# Patient Record
Sex: FEMALE | Race: WHITE | HISPANIC OR LATINO | Employment: UNEMPLOYED | URBAN - METROPOLITAN AREA
[De-identification: names, ages, dates, MRNs, and addresses within clinical notes are randomized per-mention and may not be internally consistent; named-entity substitution may affect disease eponyms.]

---

## 2019-10-10 ENCOUNTER — HOSPITAL ENCOUNTER (EMERGENCY)
Facility: HOSPITAL | Age: 69
Discharge: HOME | End: 2019-10-10
Attending: EMERGENCY MEDICINE | Admitting: EMERGENCY MEDICINE
Payer: COMMERCIAL

## 2019-10-10 VITALS
OXYGEN SATURATION: 97 % | WEIGHT: 263 LBS | RESPIRATION RATE: 16 BRPM | BODY MASS INDEX: 46.6 KG/M2 | TEMPERATURE: 97.6 F | HEART RATE: 64 BPM | HEIGHT: 63 IN | SYSTOLIC BLOOD PRESSURE: 199 MMHG | DIASTOLIC BLOOD PRESSURE: 86 MMHG

## 2019-10-10 DIAGNOSIS — R10.13 EPIGASTRIC PAIN: Primary | ICD-10-CM

## 2019-10-10 DIAGNOSIS — I10 HYPERTENSION: ICD-10-CM

## 2019-10-10 LAB
ALBUMIN SERPL BCP-MCNC: 3.5 G/DL (ref 3.5–5)
ALP SERPL-CCNC: 79 U/L (ref 46–116)
ALT SERPL W P-5'-P-CCNC: 16 U/L (ref 12–78)
ANION GAP SERPL CALCULATED.3IONS-SCNC: 12 MMOL/L (ref 4–13)
AST SERPL W P-5'-P-CCNC: 14 U/L (ref 5–45)
BASOPHILS # BLD AUTO: 0.05 THOUSANDS/ΜL (ref 0–0.1)
BASOPHILS NFR BLD AUTO: 1 % (ref 0–1)
BILIRUB DIRECT SERPL-MCNC: 0.07 MG/DL (ref 0–0.2)
BILIRUB SERPL-MCNC: 0.2 MG/DL (ref 0.2–1)
BILIRUB UR QL STRIP: NEGATIVE
BUN SERPL-MCNC: 21 MG/DL (ref 5–25)
CALCIUM SERPL-MCNC: 9.4 MG/DL (ref 8.3–10.1)
CHLORIDE SERPL-SCNC: 104 MMOL/L (ref 100–108)
CLARITY UR: CLEAR
CO2 SERPL-SCNC: 26 MMOL/L (ref 21–32)
COLOR UR: YELLOW
CREAT SERPL-MCNC: 1.02 MG/DL (ref 0.6–1.3)
EOSINOPHIL # BLD AUTO: 0.29 THOUSAND/ΜL (ref 0–0.61)
EOSINOPHIL NFR BLD AUTO: 4 % (ref 0–6)
ERYTHROCYTE [DISTWIDTH] IN BLOOD BY AUTOMATED COUNT: 14.5 % (ref 11.6–15.1)
GFR SERPL CREATININE-BSD FRML MDRD: 57 ML/MIN/1.73SQ M
GLUCOSE SERPL-MCNC: 91 MG/DL (ref 65–140)
GLUCOSE UR STRIP-MCNC: NEGATIVE MG/DL
HCT VFR BLD AUTO: 44 % (ref 34.8–46.1)
HGB BLD-MCNC: 14.2 G/DL (ref 11.5–15.4)
HGB UR QL STRIP.AUTO: NEGATIVE
IMM GRANULOCYTES # BLD AUTO: 0.02 THOUSAND/UL (ref 0–0.2)
IMM GRANULOCYTES NFR BLD AUTO: 0 % (ref 0–2)
KETONES UR STRIP-MCNC: NEGATIVE MG/DL
LEUKOCYTE ESTERASE UR QL STRIP: NEGATIVE
LIPASE SERPL-CCNC: 174 U/L (ref 73–393)
LYMPHOCYTES # BLD AUTO: 1.73 THOUSANDS/ΜL (ref 0.6–4.47)
LYMPHOCYTES NFR BLD AUTO: 21 % (ref 14–44)
MCH RBC QN AUTO: 29.2 PG (ref 26.8–34.3)
MCHC RBC AUTO-ENTMCNC: 32.3 G/DL (ref 31.4–37.4)
MCV RBC AUTO: 91 FL (ref 82–98)
MONOCYTES # BLD AUTO: 0.67 THOUSAND/ΜL (ref 0.17–1.22)
MONOCYTES NFR BLD AUTO: 8 % (ref 4–12)
NEUTROPHILS # BLD AUTO: 5.46 THOUSANDS/ΜL (ref 1.85–7.62)
NEUTS SEG NFR BLD AUTO: 66 % (ref 43–75)
NITRITE UR QL STRIP: NEGATIVE
NRBC BLD AUTO-RTO: 0 /100 WBCS
PH UR STRIP.AUTO: 6 [PH]
PLATELET # BLD AUTO: 305 THOUSANDS/UL (ref 149–390)
PMV BLD AUTO: 9.3 FL (ref 8.9–12.7)
POTASSIUM SERPL-SCNC: 3.8 MMOL/L (ref 3.5–5.3)
PROT SERPL-MCNC: 8.3 G/DL (ref 6.4–8.2)
PROT UR STRIP-MCNC: NEGATIVE MG/DL
RBC # BLD AUTO: 4.86 MILLION/UL (ref 3.81–5.12)
SODIUM SERPL-SCNC: 142 MMOL/L (ref 136–145)
SP GR UR STRIP.AUTO: 1.01 (ref 1–1.03)
TROPONIN I SERPL-MCNC: <0.02 NG/ML
UROBILINOGEN UR QL STRIP.AUTO: 0.2 E.U./DL
WBC # BLD AUTO: 8.22 THOUSAND/UL (ref 4.31–10.16)

## 2019-10-10 PROCEDURE — 93005 ELECTROCARDIOGRAM TRACING: CPT

## 2019-10-10 PROCEDURE — 99283 EMERGENCY DEPT VISIT LOW MDM: CPT

## 2019-10-10 PROCEDURE — 80048 BASIC METABOLIC PNL TOTAL CA: CPT | Performed by: EMERGENCY MEDICINE

## 2019-10-10 PROCEDURE — 81003 URINALYSIS AUTO W/O SCOPE: CPT | Performed by: EMERGENCY MEDICINE

## 2019-10-10 PROCEDURE — 99285 EMERGENCY DEPT VISIT HI MDM: CPT | Performed by: EMERGENCY MEDICINE

## 2019-10-10 PROCEDURE — 36415 COLL VENOUS BLD VENIPUNCTURE: CPT | Performed by: EMERGENCY MEDICINE

## 2019-10-10 PROCEDURE — 96360 HYDRATION IV INFUSION INIT: CPT

## 2019-10-10 PROCEDURE — 80076 HEPATIC FUNCTION PANEL: CPT | Performed by: EMERGENCY MEDICINE

## 2019-10-10 PROCEDURE — 83690 ASSAY OF LIPASE: CPT | Performed by: EMERGENCY MEDICINE

## 2019-10-10 PROCEDURE — 84484 ASSAY OF TROPONIN QUANT: CPT | Performed by: EMERGENCY MEDICINE

## 2019-10-10 PROCEDURE — 85025 COMPLETE CBC W/AUTO DIFF WBC: CPT | Performed by: EMERGENCY MEDICINE

## 2019-10-10 PROCEDURE — 96361 HYDRATE IV INFUSION ADD-ON: CPT

## 2019-10-10 RX ORDER — SUCRALFATE 1 G/1
1 TABLET ORAL 4 TIMES DAILY
Qty: 120 TABLET | Refills: 0 | Status: SHIPPED | OUTPATIENT
Start: 2019-10-10 | End: 2019-11-05

## 2019-10-10 RX ORDER — OMEPRAZOLE 20 MG/1
20 CAPSULE, DELAYED RELEASE ORAL DAILY
Qty: 30 CAPSULE | Refills: 3 | Status: SHIPPED | OUTPATIENT
Start: 2019-10-10 | End: 2019-11-05

## 2019-10-10 RX ORDER — LIDOCAINE HYDROCHLORIDE 20 MG/ML
15 SOLUTION OROPHARYNGEAL ONCE
Status: COMPLETED | OUTPATIENT
Start: 2019-10-10 | End: 2019-10-10

## 2019-10-10 RX ORDER — MAGNESIUM HYDROXIDE/ALUMINUM HYDROXICE/SIMETHICONE 120; 1200; 1200 MG/30ML; MG/30ML; MG/30ML
30 SUSPENSION ORAL ONCE
Status: COMPLETED | OUTPATIENT
Start: 2019-10-10 | End: 2019-10-10

## 2019-10-10 RX ADMIN — SODIUM CHLORIDE 1000 ML: 0.9 INJECTION, SOLUTION INTRAVENOUS at 21:44

## 2019-10-10 RX ADMIN — LIDOCAINE HYDROCHLORIDE 15 ML: 20 SOLUTION ORAL; TOPICAL at 22:03

## 2019-10-10 RX ADMIN — ALUMINUM HYDROXIDE, MAGNESIUM HYDROXIDE, AND SIMETHICONE 30 ML: 200; 200; 20 SUSPENSION ORAL at 22:03

## 2019-10-11 LAB
ATRIAL RATE: 73 BPM
P AXIS: 37 DEGREES
PR INTERVAL: 154 MS
QRS AXIS: 46 DEGREES
QRSD INTERVAL: 80 MS
QT INTERVAL: 402 MS
QTC INTERVAL: 442 MS
T WAVE AXIS: 64 DEGREES
VENTRICULAR RATE: 73 BPM

## 2019-10-11 PROCEDURE — 93010 ELECTROCARDIOGRAM REPORT: CPT | Performed by: INTERNAL MEDICINE

## 2019-10-11 NOTE — ED PROVIDER NOTES
History  Chief Complaint   Patient presents with    Medical Problem     pt c/o anxiety, pt states she got a flu shot and pneumonia shot on friday and has not been feeling right since  pt with multiple complaints: 4lb weight loss, indigestion, nausea, poor appetite, "bad feelings in my stomach," "jaw quivering dry mouth, headaches  Pt comes to ED w one week of several concerns  She reports epigastric pain similar to prior gastritis sxs, on otc omeprazole, last EGD several years ago, pain ongoing x 1 week, mild to moderate, worse with PO intake, some relief from PPI  No black or bloody stools  No fever  No back pain  No weakness or syncope  Also c/o indigestion, poor appetite, dry mouth, drooling, b/l ear pain, and HA which all began after she got a flu shot and prevnar 23 last week  No lower abd pain  No AMS or vomiting  No focal weakness or numbness  No other sxs or concerns at this time  Additional history per  at bedside  None       Past Medical History:   Diagnosis Date    GERD (gastroesophageal reflux disease)        Past Surgical History:   Procedure Laterality Date     SECTION      OVARIAN CYST REMOVAL         History reviewed  No pertinent family history  I have reviewed and agree with the history as documented  Social History     Tobacco Use    Smoking status: Never Smoker    Smokeless tobacco: Never Used   Substance Use Topics    Alcohol use: Never     Frequency: Never    Drug use: Never        Review of Systems   Constitutional: Positive for appetite change and unexpected weight change  Negative for activity change and fever  Gastrointestinal: Positive for abdominal pain and nausea  Negative for abdominal distention, anal bleeding, blood in stool, constipation, diarrhea, rectal pain and vomiting  All other systems reviewed and are negative  Physical Exam  Physical Exam   Constitutional: She is oriented to person, place, and time   She appears well-developed and well-nourished  No distress  HENT:   Head: Normocephalic and atraumatic  Right Ear: External ear normal    Left Ear: External ear normal    Mouth/Throat: Oropharynx is clear and moist  No oropharyngeal exudate  B/l TMs normal     Eyes: Pupils are equal, round, and reactive to light  Conjunctivae and EOM are normal  Right eye exhibits no discharge  Left eye exhibits no discharge  Neck: Normal range of motion  Neck supple  No JVD present  Cardiovascular: Normal rate, regular rhythm, normal heart sounds and intact distal pulses  Pulmonary/Chest: Effort normal and breath sounds normal  No stridor  Abdominal: Soft  She exhibits no distension  There is no tenderness  There is no rebound and no guarding  Musculoskeletal: Normal range of motion  She exhibits no edema, tenderness or deformity  Neurological: She is alert and oriented to person, place, and time  No cranial nerve deficit or sensory deficit  She exhibits normal muscle tone  Coordination normal    Skin: Skin is warm and dry  Capillary refill takes less than 2 seconds  No rash noted  She is not diaphoretic  No erythema  No pallor  Nursing note and vitals reviewed        Vital Signs  ED Triage Vitals [10/10/19 1950]   Temperature Pulse Respirations Blood Pressure SpO2   97 6 °F (36 4 °C) 88 18 (!) 224/91 96 %      Temp Source Heart Rate Source Patient Position - Orthostatic VS BP Location FiO2 (%)   Oral Monitor Sitting Left arm --      Pain Score       No Pain           Vitals:    10/10/19 1950 10/10/19 2216   BP: (!) 224/91 (!) 178/82   Pulse: 88 80   Patient Position - Orthostatic VS: Sitting Sitting         Visual Acuity      ED Medications  Medications   sodium chloride 0 9 % bolus 1,000 mL (1,000 mL Intravenous New Bag 10/10/19 2144)   aluminum-magnesium hydroxide-simethicone (MYLANTA) 200-200-20 mg/5 mL oral suspension 30 mL (30 mL Oral Given 10/10/19 2203)   Lidocaine Viscous HCl (XYLOCAINE) 2 % mucosal solution 15 mL (15 mL Swish & Swallow Given 10/10/19 2203)       Diagnostic Studies  Results Reviewed     Procedure Component Value Units Date/Time    UA w Reflex to Microscopic [[id number]] Collected:  10/10/19 2215    Lab Status:  Final result Specimen:  Urine, Clean Catch Updated:  10/10/19 2224     Color, UA Yellow     Clarity, UA Clear     Specific Gravity, UA 1 010     pH, UA 6 0     Leukocytes, UA Negative     Nitrite, UA Negative     Protein, UA Negative mg/dl      Glucose, UA Negative mg/dl      Ketones, UA Negative mg/dl      Urobilinogen, UA 0 2 E U /dl      Bilirubin, UA Negative     Blood, UA Negative    Basic metabolic panel [[id number]] Collected:  10/10/19 2144    Lab Status:  Final result Specimen:  Blood from Arm, Right Updated:  10/10/19 2211     Sodium 142 mmol/L      Potassium 3 8 mmol/L      Chloride 104 mmol/L      CO2 26 mmol/L      ANION GAP 12 mmol/L      BUN 21 mg/dL      Creatinine 1 02 mg/dL      Glucose 91 mg/dL      Calcium 9 4 mg/dL      eGFR 57 ml/min/1 73sq m     Narrative:       [place] guidelines for Chronic Kidney Disease (CKD):     Stage 1 with normal or high GFR (GFR > 90 mL/min/1 73 square meters)    Stage 2 Mild CKD (GFR = 60-89 mL/min/1 73 square meters)    Stage 3A Moderate CKD (GFR = 45-59 mL/min/1 73 square meters)    Stage 3B Moderate CKD (GFR = 30-44 mL/min/1 73 square meters)    Stage 4 Severe CKD (GFR = 15-29 mL/min/1 73 square meters)    Stage 5 End Stage CKD (GFR <15 mL/min/1 73 square meters)  Note: GFR calculation is accurate only with a steady state creatinine    Hepatic function panel [[id number]]  (Abnormal) Collected:  10/10/19 2144    Lab Status:  Final result Specimen:  Blood from Arm, Right Updated:  10/10/19 2211     Total Bilirubin 0 20 mg/dL      Bilirubin, Direct 0 07 mg/dL      Alkaline Phosphatase 79 U/L      AST 14 U/L      ALT 16 U/L      Total Protein 8 3 g/dL      Albumin 3 5 g/dL     Lipase [[id number]]  (Normal) Collected:  10/10/19 2144    Lab Status:  Final result Specimen:  Blood from Arm, Right Updated:  10/10/19 2211     Lipase 174 u/L     Troponin I [[id number]]  (Normal) Collected:  10/10/19 2144    Lab Status:  Final result Specimen:  Blood from Arm, Right Updated:  10/10/19 2209     Troponin I <0 02 ng/mL     CBC and differential [[id number]] Collected:  10/10/19 2144    Lab Status:  Final result Specimen:  Blood from Arm, Right Updated:  10/10/19 2151     WBC 8 22 Thousand/uL      RBC 4 86 Million/uL      Hemoglobin 14 2 g/dL      Hematocrit 44 0 %      MCV 91 fL      MCH 29 2 pg      MCHC 32 3 g/dL      RDW 14 5 %      MPV 9 3 fL      Platelets 082 Thousands/uL      nRBC 0 /100 WBCs      Neutrophils Relative 66 %      Immat GRANS % 0 %      Lymphocytes Relative 21 %      Monocytes Relative 8 %      Eosinophils Relative 4 %      Basophils Relative 1 %      Neutrophils Absolute 5 46 Thousands/µL      Immature Grans Absolute 0 02 Thousand/uL      Lymphocytes Absolute 1 73 Thousands/µL      Monocytes Absolute 0 67 Thousand/µL      Eosinophils Absolute 0 29 Thousand/µL      Basophils Absolute 0 05 Thousands/µL                  No orders to display              Procedures  ECG 12 Lead Documentation Only  Date/Time: 10/10/2019 10:24 PM  Performed by: Coy Joseph MD  Authorized by: Coy Joseph MD     Indications / Diagnosis:  Epigastric pain   ECG reviewed by me, the ED Provider: yes    Patient location:  ED  Previous ECG:     Previous ECG:  Unavailable  Interpretation:     Interpretation: normal    Rate:     ECG rate:  73    ECG rate assessment: normal    Rhythm:     Rhythm: sinus rhythm    Comments:      Normal ekg  ED Course           Identification of Seniors at Risk      Most Recent Value   (ISAR) Identification of Seniors at Risk   Before the illness or injury that brought you to the Emergency, did you need someone to help you on a regular basis?   0 Filed at: 10/10/2019 1953   In the last 24 hours, have you needed more help than usual?  0 Filed at: 10/10/2019 1953   Have you been hospitalized for one or more nights during the past 6 months? 0 Filed at: 10/10/2019 1953   In general, do you see well?  0 Filed at: 10/10/2019 1953   In general, do you have serious problems with your memory? 0 Filed at: 10/10/2019 1953   Do you take more than three different medications every day?  0 Filed at: 10/10/2019 1953   ISAR Score  0 Filed at: 10/10/2019 1953                          MDM  Number of Diagnoses or Management Options  Epigastric pain:   Hypertension:   Diagnosis management comments: epigastric pain in setting of prior gastritis, suspect recurrent gastritis/ulcer disease  No gi bleeding  Normal labs and ekg  Reassuring exam  Discussed htn and need for f/u for same, has pcp appt upcoming  Referred to local GI doc and recommended EGD  Pt and  comfortable w plan to d/c home at this time  Amount and/or Complexity of Data Reviewed  Clinical lab tests: reviewed and ordered  Tests in the radiology section of CPT®: reviewed and ordered  Independent visualization of images, tracings, or specimens: yes        Disposition  Final diagnoses:   Epigastric pain   Hypertension     Time reflects when diagnosis was documented in both MDM as applicable and the Disposition within this note     Time User Action Codes Description Comment    10/10/2019 10:44 PM Magalis Esparza Add [R10 13] Epigastric pain     10/10/2019 10:44 PM Davina Jensen[address] Hypertension       ED Disposition     ED Disposition Condition Date/Time Comment    Discharge Stable Thu Oct 10, 2019 10:44 PM Nikole Morin discharge to home/self care              Follow-up Information     Follow up With Specialties Details Why Contact Info    Ponce oCx MD Gastroenterology In 1 week  Lonny Osborne [address]  [address]  [facility]  [address] [facility] Internal Medicine   [address]  [phone]            Patient's Medications   Discharge Prescriptions    OMEPRAZOLE (PRILOSEC) 20 MG DELAYED RELEASE CAPSULE    Take 1 capsule (20 mg total) by mouth daily       Start Date: 10/10/2019End Date: --       Order Dose: 20 mg       Quantity: 30 capsule    Refills: 3    SUCRALFATE (CARAFATE) 1 G TABLET    Take 1 tablet (1 g total) by mouth 4 (four) times a day       Start Date: 10/10/2019End Date: --       Order Dose: 1 g       Quantity: 120 tablet    Refills: 0     No discharge procedures on file      ED Provider  Electronically Signed by           Alma Alexandre MD  10/10/19 7191

## 2019-11-05 ENCOUNTER — OFFICE VISIT (OUTPATIENT)
Dept: GASTROENTEROLOGY | Facility: CLINIC | Age: 69
End: 2019-11-05
Payer: COMMERCIAL

## 2019-11-05 VITALS
SYSTOLIC BLOOD PRESSURE: 134 MMHG | WEIGHT: 255 LBS | HEIGHT: 63 IN | HEART RATE: 78 BPM | DIASTOLIC BLOOD PRESSURE: 82 MMHG | BODY MASS INDEX: 45.18 KG/M2

## 2019-11-05 DIAGNOSIS — Z12.11 COLON CANCER SCREENING: ICD-10-CM

## 2019-11-05 DIAGNOSIS — K21.9 GASTROESOPHAGEAL REFLUX DISEASE WITHOUT ESOPHAGITIS: Primary | ICD-10-CM

## 2019-11-05 PROCEDURE — 99244 OFF/OP CNSLTJ NEW/EST MOD 40: CPT | Performed by: INTERNAL MEDICINE

## 2019-11-05 NOTE — PROGRESS NOTES
Consultation - [facility]  Marleen [place] 1950 79 y o  female     ASSESSMENT @ PLAN:   She is a 47-year-old female with longstanding gastroesophageal reflux disease with uncontrolled symptoms and an emergency room visit with heartburn epigastric abdominal pain globus and dysphagia despite Nexium 22 2 mg daily  In addition she will need colon cancer screening in the future  Her last colonoscopy was 10 years ago  1 will do EGD to investigate    2 I told her to do the Nexium over-the-counter twice a day for 6-8 weeks and then go back down to once a day    3 will stop the Carafate that was given to her by the emergency room    4 reflux precautions were reviewed with the patient    5 will schedule her colonoscopy in the future at some point    Chief Complaint:   GERD    HPI:   She is a 47-year-old female with longstanding gastroesophageal reflux disease likely related to her obesity  His has been well managed with over-the-counter and Nexium 22 2 mg for a long time  She was on omeprazole for a while this stopped working  She went to the emergency room for 2 days of severe heartburn and epigastric abdominal pain  She did respond to a GI cocktail there  At home she had not responded to Tums or Lis-[place]  Her labs were okay in the emergency room no imaging was done  In questioning her she does report occasional globus and dysphagia solid food dysphagia she denies burping or belching she had been taking a lot of aspirin for headaches  She does not have any melena hematochezia  She had endoscopy 10 years ago  She also to chronic constipation and feeling of gas in her chest   Her last colonoscopy was 10 years ago  REVIEW OF SYSTEMS:     CONSTITUTIONAL: Denies any fever, chills, or rigors  Good appetite, and no recent weight loss  HEENT: No earache or tinnitus  Denies hearing loss or visual disturbances  CARDIOVASCULAR: No chest pain or palpitations     RESPIRATORY: Denies any cough, hemoptysis, shortness of breath or dyspnea on exertion  GASTROINTESTINAL: As noted in the History of Present Illness  GENITOURINARY: No problems with urination  Denies any hematuria or dysuria  NEUROLOGIC: No dizziness or vertigo, denies headaches  MUSCULOSKELETAL: Denies any muscle or joint pain  SKIN: Denies skin rashes or itching  ENDOCRINE: Denies excessive thirst  Denies intolerance to heat or cold  PSYCHOSOCIAL: Denies depression or anxiety  Denies any recent memory loss       Past Medical History:   Diagnosis Date    GERD (gastroesophageal reflux disease)     Obesity       Past Surgical History:   Procedure Laterality Date     SECTION      OVARIAN CYST REMOVAL       Social History     Socioeconomic History    Marital status: /Civil Union     Spouse name: Not on file    Number of children: Not on file    Years of education: Not on file    Highest education level: Not on file   Occupational History    Not on file   Social Needs    Financial resource strain: Not on file    Food insecurity:     Worry: Not on file     Inability: Not on file    Transportation needs:     Medical: Not on file     Non-medical: Not on file   Tobacco Use    Smoking status: Never Smoker    Smokeless tobacco: Never Used   Substance and Sexual Activity    Alcohol use: Never     Frequency: Never    Drug use: Never    Sexual activity: Not on file   Lifestyle    Physical activity:     Days per week: Not on file     Minutes per session: Not on file    Stress: Not on file   Relationships    Social connections:     Talks on phone: Not on file     Gets together: Not on file     Attends Buddhism service: Not on file     Active member of club or organization: Not on file     Attends meetings of clubs or organizations: Not on file     Relationship status: Not on file    Intimate partner violence:     Fear of current or ex partner: Not on file     Emotionally abused: Not on file     Physically abused: Not on file     Forced sexual activity: Not on file   Other Topics Concern    Not on file   Social History Narrative    Not on file     Family History   Problem Relation Age of Onset    Hypertension Mother     Arthritis Mother     Heart disease Father      Penicillins  Current Outpatient Medications   Medication Sig Dispense Refill    esomeprazole (NexIUM) 20 mg capsule Take 20 mg by mouth 2 (two) times a day       No current facility-administered medications for this visit  Blood pressure 134/82, pulse 78, height 5' 3" (1 6 m), weight 116 kg (255 lb)  PHYSICAL EXAM:     General Appearance:   Alert, cooperative, no distress, appears stated age    HEENT:   Normocephalic, atraumatic, anicteric      Neck:  Supple, symmetrical, trachea midline, no adenopathy;    thyroid: no enlargement/tenderness/nodules; no carotid  bruit or JVD    Lungs:   Clear to auscultation bilaterally; no rales, rhonchi or wheezing; respirations unlabored    Heart[de-identified]   S1 and S2 normal; regular rate and rhythm; no murmur, rub, or gallop     Abdomen:   Soft, non-tender, non-distended; normal bowel sounds; no masses, no organomegaly    Genitalia:   Deferred    Rectal:   Deferred    Extremities:  No cyanosis, clubbing or edema    Pulses:  2+ and symmetric all extremities    Skin:  Skin color, texture, turgor normal, no rashes or lesions    Lymph nodes:  No palpable cervical, axillary or inguinal lymphadenopathy        Lab Results   Component Value Date    WBC 8 22 10/10/2019    HGB 14 2 10/10/2019    HCT 44 0 10/10/2019    MCV 91 10/10/2019     10/10/2019     Lab Results   Component Value Date    CALCIUM 9 4 10/10/2019    K 3 8 10/10/2019    CO2 26 10/10/2019     10/10/2019    BUN 21 10/10/2019    CREATININE 1 02 10/10/2019     Lab Results   Component Value Date    ALT 16 10/10/2019    AST 14 10/10/2019    ALKPHOS 79 10/10/2019     No results found for: INR, PROTIME

## 2019-11-05 NOTE — H&P (VIEW-ONLY)
Consultation - [facility]  Sohail Calle 1950 79 y o  female     ASSESSMENT @ PLAN:   She is a 61-year-old female with longstanding gastroesophageal reflux disease with uncontrolled symptoms and an emergency room visit with heartburn epigastric abdominal pain globus and dysphagia despite Nexium 22 2 mg daily  In addition she will need colon cancer screening in the future  Her last colonoscopy was 10 years ago  1 will do EGD to investigate    2 I told her to do the Nexium over-the-counter twice a day for 6-8 weeks and then go back down to once a day    3 will stop the Carafate that was given to her by the emergency room    4 reflux precautions were reviewed with the patient    5 will schedule her colonoscopy in the future at some point    Chief Complaint:   GERD    HPI:   She is a 61-year-old female with longstanding gastroesophageal reflux disease likely related to her obesity  His has been well managed with over-the-counter and Nexium 22 2 mg for a long time  She was on omeprazole for a while this stopped working  She went to the emergency room for 2 days of severe heartburn and epigastric abdominal pain  She did respond to a GI cocktail there  At home she had not responded to Tums or Lis-[place]  Her labs were okay in the emergency room no imaging was done  In questioning her she does report occasional globus and dysphagia solid food dysphagia she denies burping or belching she had been taking a lot of aspirin for headaches  She does not have any melena hematochezia  She had endoscopy 10 years ago  She also to chronic constipation and feeling of gas in her chest   Her last colonoscopy was 10 years ago  REVIEW OF SYSTEMS:     CONSTITUTIONAL: Denies any fever, chills, or rigors  Good appetite, and no recent weight loss  HEENT: No earache or tinnitus  Denies hearing loss or visual disturbances  CARDIOVASCULAR: No chest pain or palpitations     RESPIRATORY: Denies any cough, hemoptysis, shortness of breath or dyspnea on exertion  GASTROINTESTINAL: As noted in the History of Present Illness  GENITOURINARY: No problems with urination  Denies any hematuria or dysuria  NEUROLOGIC: No dizziness or vertigo, denies headaches  MUSCULOSKELETAL: Denies any muscle or joint pain  SKIN: Denies skin rashes or itching  ENDOCRINE: Denies excessive thirst  Denies intolerance to heat or cold  PSYCHOSOCIAL: Denies depression or anxiety  Denies any recent memory loss       Past Medical History:   Diagnosis Date    GERD (gastroesophageal reflux disease)     Obesity       Past Surgical History:   Procedure Laterality Date     SECTION      OVARIAN CYST REMOVAL       Social History     Socioeconomic History    Marital status: /Civil Union     Spouse name: Not on file    Number of children: Not on file    Years of education: Not on file    Highest education level: Not on file   Occupational History    Not on file   Social Needs    Financial resource strain: Not on file    Food insecurity:     Worry: Not on file     Inability: Not on file    Transportation needs:     Medical: Not on file     Non-medical: Not on file   Tobacco Use    Smoking status: Never Smoker    Smokeless tobacco: Never Used   Substance and Sexual Activity    Alcohol use: Never     Frequency: Never    Drug use: Never    Sexual activity: Not on file   Lifestyle    Physical activity:     Days per week: Not on file     Minutes per session: Not on file    Stress: Not on file   Relationships    Social connections:     Talks on phone: Not on file     Gets together: Not on file     Attends Bahai service: Not on file     Active member of club or organization: Not on file     Attends meetings of clubs or organizations: Not on file     Relationship status: Not on file    Intimate partner violence:     Fear of current or ex partner: Not on file     Emotionally abused: Not on file     Physically abused: Not on file     Forced sexual activity: Not on file   Other Topics Concern    Not on file   Social History Narrative    Not on file     Family History   Problem Relation Age of Onset    Hypertension Mother     Arthritis Mother     Heart disease Father      Penicillins  Current Outpatient Medications   Medication Sig Dispense Refill    esomeprazole (NexIUM) 20 mg capsule Take 20 mg by mouth 2 (two) times a day       No current facility-administered medications for this visit  Blood pressure 134/82, pulse 78, height 5' 3" (1 6 m), weight 116 kg (255 lb)  PHYSICAL EXAM:     General Appearance:   Alert, cooperative, no distress, appears stated age    HEENT:   Normocephalic, atraumatic, anicteric      Neck:  Supple, symmetrical, trachea midline, no adenopathy;    thyroid: no enlargement/tenderness/nodules; no carotid  bruit or JVD    Lungs:   Clear to auscultation bilaterally; no rales, rhonchi or wheezing; respirations unlabored    Heart[de-identified]   S1 and S2 normal; regular rate and rhythm; no murmur, rub, or gallop     Abdomen:   Soft, non-tender, non-distended; normal bowel sounds; no masses, no organomegaly    Genitalia:   Deferred    Rectal:   Deferred    Extremities:  No cyanosis, clubbing or edema    Pulses:  2+ and symmetric all extremities    Skin:  Skin color, texture, turgor normal, no rashes or lesions    Lymph nodes:  No palpable cervical, axillary or inguinal lymphadenopathy        Lab Results   Component Value Date    WBC 8 22 10/10/2019    HGB 14 2 10/10/2019    HCT 44 0 10/10/2019    MCV 91 10/10/2019     10/10/2019     Lab Results   Component Value Date    CALCIUM 9 4 10/10/2019    K 3 8 10/10/2019    CO2 26 10/10/2019     10/10/2019    BUN 21 10/10/2019    CREATININE 1 02 10/10/2019     Lab Results   Component Value Date    ALT 16 10/10/2019    AST 14 10/10/2019    ALKPHOS 79 10/10/2019     No results found for: INR, PROTIME

## 2019-11-08 ENCOUNTER — TELEPHONE (OUTPATIENT)
Dept: GASTROENTEROLOGY | Facility: CLINIC | Age: 69
End: 2019-11-08

## 2019-11-11 ENCOUNTER — TELEPHONE (OUTPATIENT)
Dept: SURGERY | Facility: HOSPITAL | Age: 69
End: 2019-11-11

## 2019-11-12 ENCOUNTER — TELEPHONE (OUTPATIENT)
Dept: SURGERY | Facility: HOSPITAL | Age: 69
End: 2019-11-12

## 2019-11-13 ENCOUNTER — ANESTHESIA (OUTPATIENT)
Dept: GASTROENTEROLOGY | Facility: HOSPITAL | Age: 69
End: 2019-11-13

## 2019-11-13 ENCOUNTER — ANESTHESIA EVENT (OUTPATIENT)
Dept: GASTROENTEROLOGY | Facility: HOSPITAL | Age: 69
End: 2019-11-13

## 2019-11-13 ENCOUNTER — HOSPITAL ENCOUNTER (OUTPATIENT)
Dept: GASTROENTEROLOGY | Facility: HOSPITAL | Age: 69
Setting detail: OUTPATIENT SURGERY
Discharge: HOME | End: 2019-11-13
Attending: INTERNAL MEDICINE | Admitting: INTERNAL MEDICINE
Payer: COMMERCIAL

## 2019-11-13 VITALS
HEIGHT: 63 IN | BODY MASS INDEX: 44.53 KG/M2 | TEMPERATURE: 97.6 F | SYSTOLIC BLOOD PRESSURE: 125 MMHG | DIASTOLIC BLOOD PRESSURE: 65 MMHG | WEIGHT: 251.32 LBS | RESPIRATION RATE: 19 BRPM | OXYGEN SATURATION: 99 % | HEART RATE: 65 BPM

## 2019-11-13 DIAGNOSIS — K21.9 GASTROESOPHAGEAL REFLUX DISEASE WITHOUT ESOPHAGITIS: ICD-10-CM

## 2019-11-13 PROCEDURE — 88305 TISSUE EXAM BY PATHOLOGIST: CPT | Performed by: PATHOLOGY

## 2019-11-13 PROCEDURE — 43239 EGD BIOPSY SINGLE/MULTIPLE: CPT | Performed by: INTERNAL MEDICINE

## 2019-11-13 PROCEDURE — 88342 IMHCHEM/IMCYTCHM 1ST ANTB: CPT | Performed by: PATHOLOGY

## 2019-11-13 RX ORDER — SODIUM CHLORIDE, SODIUM LACTATE, POTASSIUM CHLORIDE, CALCIUM CHLORIDE 600; 310; 30; 20 MG/100ML; MG/100ML; MG/100ML; MG/100ML
INJECTION, SOLUTION INTRAVENOUS CONTINUOUS PRN
Status: DISCONTINUED | OUTPATIENT
Start: 2019-11-13 | End: 2019-11-13 | Stop reason: SURG

## 2019-11-13 RX ORDER — PROPOFOL 10 MG/ML
INJECTION, EMULSION INTRAVENOUS AS NEEDED
Status: DISCONTINUED | OUTPATIENT
Start: 2019-11-13 | End: 2019-11-13 | Stop reason: SURG

## 2019-11-13 RX ORDER — MIDAZOLAM HYDROCHLORIDE 2 MG/2ML
INJECTION, SOLUTION INTRAMUSCULAR; INTRAVENOUS AS NEEDED
Status: DISCONTINUED | OUTPATIENT
Start: 2019-11-13 | End: 2019-11-13 | Stop reason: SURG

## 2019-11-13 RX ORDER — LABETALOL 20 MG/4 ML (5 MG/ML) INTRAVENOUS SYRINGE
AS NEEDED
Status: DISCONTINUED | OUTPATIENT
Start: 2019-11-13 | End: 2019-11-13 | Stop reason: SURG

## 2019-11-13 RX ORDER — LIDOCAINE HYDROCHLORIDE 10 MG/ML
INJECTION, SOLUTION INFILTRATION; PERINEURAL AS NEEDED
Status: DISCONTINUED | OUTPATIENT
Start: 2019-11-13 | End: 2019-11-13 | Stop reason: SURG

## 2019-11-13 RX ADMIN — LABETALOL 20 MG/4 ML (5 MG/ML) INTRAVENOUS SYRINGE 10 MG: at 09:38

## 2019-11-13 RX ADMIN — LIDOCAINE HYDROCHLORIDE 50 MG: 10 INJECTION, SOLUTION INFILTRATION; PERINEURAL at 10:06

## 2019-11-13 RX ADMIN — PROPOFOL 150 MG: 10 INJECTION, EMULSION INTRAVENOUS at 10:06

## 2019-11-13 RX ADMIN — SODIUM CHLORIDE, SODIUM LACTATE, POTASSIUM CHLORIDE, AND CALCIUM CHLORIDE: .6; .31; .03; .02 INJECTION, SOLUTION INTRAVENOUS at 09:26

## 2019-11-13 RX ADMIN — MIDAZOLAM HYDROCHLORIDE 2 MG: 1 INJECTION, SOLUTION INTRAMUSCULAR; INTRAVENOUS at 09:42

## 2019-11-13 RX ADMIN — LABETALOL 20 MG/4 ML (5 MG/ML) INTRAVENOUS SYRINGE 10 MG: at 09:43

## 2019-11-13 NOTE — DISCHARGE INSTRUCTIONS
Upper Endoscopy   WHAT YOU NEED TO KNOW:   An upper endoscopy is also called an upper gastrointestinal (GI) endoscopy, or an esophagogastroduodenoscopy (EGD)  You may feel bloated, gassy, or have some abdominal discomfort after your procedure  Your throat may be sore for 24 to 36 hours  You may burp or pass gas from air that is still inside your body  DISCHARGE INSTRUCTIONS:   Call 911 if:   · You have sudden chest pain or trouble breathing  Seek care immediately if:   · You feel dizzy or faint  · You have trouble swallowing  · You have severe throat pain  · Your bowel movements are very dark or black  · Your abdomen is hard and firm and you have severe pain  · You vomit blood  Contact your healthcare provider if:   · You feel full or bloated and cannot burp or pass gas  · You have not had a bowel movement for 3 days after your procedure  · You have neck pain  · You have a fever or chills  · You have nausea or are vomiting  · You have a rash or hives  · You have questions or concerns about your endoscopy  Relieve a sore throat:  Suck on throat lozenges or crushed ice  Gargle with a small amount of warm salt water  Mix 1 teaspoon of salt and 1 cup of warm water to make salt water  Relieve gas and discomfort from bloating:  Lie on your right side with a heating pad on your abdomen  Take short walks to help pass gas  Eat small meals until bloating is relieved  Rest after your procedure:  Do not drive or make important decisions until the day after your procedure  Return to your normal activity as directed  You can usually return to work the day after your procedure  Follow up with your healthcare provider as directed:  Write down your questions so you remember to ask them during your visits  © 2017 [employer]  Information is for End User's use only and may not be sold, redistributed or otherwise used for commercial purposes   All illustrations and images included in [employer] are the copyrighted property of [employer] , [employer]  or Remington Young  The above information is an  only  It is not intended as medical advice for individual conditions or treatments  Talk to your doctor, nurse or pharmacist before following any medical regimen to see if it is safe and effective for you

## 2019-11-13 NOTE — ANESTHESIA PREPROCEDURE EVALUATION
Review of Systems/Medical History  Patient summary reviewed  Chart reviewed      Cardiovascular  Hypertension ,    Pulmonary       GI/Hepatic    GERD ,             Endo/Other    Obesity    GYN       Hematology   Musculoskeletal       Neurology   Psychology           Physical Exam    Airway    Mallampati score: II         Dental   No notable dental hx     Cardiovascular  Rhythm: regular, Rate: normal,     Pulmonary  Pulmonary exam normal     Other Findings        Anesthesia Plan  ASA Score- 3     Anesthesia Type- IV sedation with anesthesia with ASA Monitors  Additional Monitors:   Airway Plan:         Plan Factors-Patient not instructed to abstain from smoking on day of procedure  Patient did not smoke on day of surgery  Induction- intravenous  Postoperative Plan-     Informed Consent- Anesthetic plan and risks discussed with patient  I personally reviewed this patient with the CRNA  Discussed and agreed on the Anesthesia Plan with the CRNA  Carlos Georges

## 2019-11-13 NOTE — ANESTHESIA POSTPROCEDURE EVALUATION
Post-Op Assessment Note    CV Status:  Stable    Pain management: adequate     Mental Status:  Awake and sleepy   Hydration Status:  Euvolemic   PONV Controlled:  Controlled   Airway Patency:  Patent   Post Op Vitals Reviewed: Yes      Staff: CRNA           BP  128/69   Temp      Pulse  67   Resp   20   SpO2   96

## 2019-11-20 ENCOUNTER — TELEPHONE (OUTPATIENT)
Dept: GASTROENTEROLOGY | Facility: CLINIC | Age: 69
End: 2019-11-20

## 2019-11-20 NOTE — TELEPHONE ENCOUNTER
Spoke with patient advised her of negative biopsies  She did not have any additional questions at this time

## 2019-11-20 NOTE — TELEPHONE ENCOUNTER
----- Message from Kae Rabago MD sent at 11/20/2019 10:17 AM EST -----  Please tell her that the biopsies are all negative

## 2019-12-04 LAB
COLOGUARD RESULT REPORTABLE: NEGATIVE
EXTERNAL COLOGUARD RESULT: NEGATIVE

## 2020-01-14 ENCOUNTER — TELEPHONE (OUTPATIENT)
Dept: GASTROENTEROLOGY | Facility: CLINIC | Age: 70
End: 2020-01-14

## 2020-07-26 ENCOUNTER — HOSPITAL ENCOUNTER (INPATIENT)
Facility: HOSPITAL | Age: 70
LOS: 1 days | Discharge: HOME | DRG: 287 | End: 2020-07-28
Attending: INTERNAL MEDICINE | Admitting: INTERNAL MEDICINE
Payer: COMMERCIAL

## 2020-07-26 ENCOUNTER — APPOINTMENT (EMERGENCY)
Dept: RADIOLOGY | Facility: HOSPITAL | Age: 70
DRG: 287 | End: 2020-07-26
Payer: COMMERCIAL

## 2020-07-26 DIAGNOSIS — M25.512 CHRONIC LEFT SHOULDER PAIN: ICD-10-CM

## 2020-07-26 DIAGNOSIS — R07.9 CHEST PAIN: Primary | ICD-10-CM

## 2020-07-26 DIAGNOSIS — G89.29 CHRONIC LEFT SHOULDER PAIN: ICD-10-CM

## 2020-07-26 PROBLEM — E66.01 OBESITY, MORBID (HCC): Status: ACTIVE | Noted: 2020-07-26

## 2020-07-26 PROBLEM — I10 HYPERTENSION: Status: ACTIVE | Noted: 2020-07-26

## 2020-07-26 LAB
ALBUMIN SERPL BCP-MCNC: 3.4 G/DL (ref 3.5–5)
ALP SERPL-CCNC: 85 U/L (ref 46–116)
ALT SERPL W P-5'-P-CCNC: 24 U/L (ref 12–78)
ANION GAP SERPL CALCULATED.3IONS-SCNC: 11 MMOL/L (ref 4–13)
APTT PPP: 30 SECONDS (ref 23–37)
AST SERPL W P-5'-P-CCNC: 20 U/L (ref 5–45)
BASOPHILS # BLD AUTO: 0.06 THOUSANDS/ΜL (ref 0–0.1)
BASOPHILS NFR BLD AUTO: 1 % (ref 0–1)
BILIRUB SERPL-MCNC: 0.3 MG/DL (ref 0.2–1)
BUN SERPL-MCNC: 17 MG/DL (ref 5–25)
CALCIUM SERPL-MCNC: 9.2 MG/DL (ref 8.3–10.1)
CHLORIDE SERPL-SCNC: 104 MMOL/L (ref 100–108)
CO2 SERPL-SCNC: 26 MMOL/L (ref 21–32)
CREAT SERPL-MCNC: 0.96 MG/DL (ref 0.6–1.3)
EOSINOPHIL # BLD AUTO: 0.49 THOUSAND/ΜL (ref 0–0.61)
EOSINOPHIL NFR BLD AUTO: 7 % (ref 0–6)
ERYTHROCYTE [DISTWIDTH] IN BLOOD BY AUTOMATED COUNT: 15 % (ref 11.6–15.1)
GFR SERPL CREATININE-BSD FRML MDRD: 61 ML/MIN/1.73SQ M
GLUCOSE SERPL-MCNC: 91 MG/DL (ref 65–140)
HCT VFR BLD AUTO: 43.1 % (ref 34.8–46.1)
HGB BLD-MCNC: 13.8 G/DL (ref 11.5–15.4)
IMM GRANULOCYTES # BLD AUTO: 0.03 THOUSAND/UL (ref 0–0.2)
IMM GRANULOCYTES NFR BLD AUTO: 0 % (ref 0–2)
INR PPP: 0.9 (ref 0.84–1.19)
LIPASE SERPL-CCNC: 150 U/L (ref 73–393)
LYMPHOCYTES # BLD AUTO: 1.58 THOUSANDS/ΜL (ref 0.6–4.47)
LYMPHOCYTES NFR BLD AUTO: 22 % (ref 14–44)
MCH RBC QN AUTO: 28.9 PG (ref 26.8–34.3)
MCHC RBC AUTO-ENTMCNC: 32 G/DL (ref 31.4–37.4)
MCV RBC AUTO: 90 FL (ref 82–98)
MONOCYTES # BLD AUTO: 0.54 THOUSAND/ΜL (ref 0.17–1.22)
MONOCYTES NFR BLD AUTO: 8 % (ref 4–12)
NEUTROPHILS # BLD AUTO: 4.35 THOUSANDS/ΜL (ref 1.85–7.62)
NEUTS SEG NFR BLD AUTO: 62 % (ref 43–75)
NRBC BLD AUTO-RTO: 0 /100 WBCS
PLATELET # BLD AUTO: 276 THOUSANDS/UL (ref 149–390)
PLATELET # BLD AUTO: 307 THOUSANDS/UL (ref 149–390)
PMV BLD AUTO: 9 FL (ref 8.9–12.7)
PMV BLD AUTO: 9.1 FL (ref 8.9–12.7)
POTASSIUM SERPL-SCNC: 3.9 MMOL/L (ref 3.5–5.3)
PROT SERPL-MCNC: 8.1 G/DL (ref 6.4–8.2)
PROTHROMBIN TIME: 12.4 SECONDS (ref 11.6–14.5)
RBC # BLD AUTO: 4.77 MILLION/UL (ref 3.81–5.12)
SODIUM SERPL-SCNC: 141 MMOL/L (ref 136–145)
TROPONIN I SERPL-MCNC: <0.02 NG/ML
TROPONIN I SERPL-MCNC: <0.02 NG/ML
WBC # BLD AUTO: 7.05 THOUSAND/UL (ref 4.31–10.16)

## 2020-07-26 PROCEDURE — 83690 ASSAY OF LIPASE: CPT | Performed by: EMERGENCY MEDICINE

## 2020-07-26 PROCEDURE — 85025 COMPLETE CBC W/AUTO DIFF WBC: CPT | Performed by: EMERGENCY MEDICINE

## 2020-07-26 PROCEDURE — 84484 ASSAY OF TROPONIN QUANT: CPT | Performed by: INTERNAL MEDICINE

## 2020-07-26 PROCEDURE — 84484 ASSAY OF TROPONIN QUANT: CPT | Performed by: EMERGENCY MEDICINE

## 2020-07-26 PROCEDURE — 99219 PR INITIAL OBSERVATION CARE/DAY 50 MINUTES: CPT | Performed by: INTERNAL MEDICINE

## 2020-07-26 PROCEDURE — 85610 PROTHROMBIN TIME: CPT | Performed by: EMERGENCY MEDICINE

## 2020-07-26 PROCEDURE — 99285 EMERGENCY DEPT VISIT HI MDM: CPT

## 2020-07-26 PROCEDURE — 71046 X-RAY EXAM CHEST 2 VIEWS: CPT

## 2020-07-26 PROCEDURE — 36415 COLL VENOUS BLD VENIPUNCTURE: CPT | Performed by: EMERGENCY MEDICINE

## 2020-07-26 PROCEDURE — 93005 ELECTROCARDIOGRAM TRACING: CPT

## 2020-07-26 PROCEDURE — 99285 EMERGENCY DEPT VISIT HI MDM: CPT | Performed by: EMERGENCY MEDICINE

## 2020-07-26 PROCEDURE — 85049 AUTOMATED PLATELET COUNT: CPT | Performed by: INTERNAL MEDICINE

## 2020-07-26 PROCEDURE — 80053 COMPREHEN METABOLIC PANEL: CPT | Performed by: EMERGENCY MEDICINE

## 2020-07-26 PROCEDURE — 85730 THROMBOPLASTIN TIME PARTIAL: CPT | Performed by: EMERGENCY MEDICINE

## 2020-07-26 RX ORDER — ASPIRIN 81 MG/1
81 TABLET, CHEWABLE ORAL DAILY
Status: DISCONTINUED | OUTPATIENT
Start: 2020-07-27 | End: 2020-07-28 | Stop reason: HOSPADM

## 2020-07-26 RX ORDER — NITROGLYCERIN 0.4 MG/1
0.4 TABLET SUBLINGUAL ONCE
Status: COMPLETED | OUTPATIENT
Start: 2020-07-26 | End: 2020-07-26

## 2020-07-26 RX ORDER — ATORVASTATIN CALCIUM 40 MG/1
40 TABLET, FILM COATED ORAL EVERY EVENING
Status: DISCONTINUED | OUTPATIENT
Start: 2020-07-26 | End: 2020-07-28 | Stop reason: HOSPADM

## 2020-07-26 RX ORDER — NITROGLYCERIN 0.4 MG/1
0.4 TABLET SUBLINGUAL
Status: DISCONTINUED | OUTPATIENT
Start: 2020-07-26 | End: 2020-07-28 | Stop reason: HOSPADM

## 2020-07-26 RX ORDER — PANTOPRAZOLE SODIUM 40 MG/1
40 TABLET, DELAYED RELEASE ORAL
Status: DISCONTINUED | OUTPATIENT
Start: 2020-07-27 | End: 2020-07-26 | Stop reason: SDUPTHER

## 2020-07-26 RX ORDER — PANTOPRAZOLE SODIUM 40 MG/1
40 TABLET, DELAYED RELEASE ORAL
Status: DISCONTINUED | OUTPATIENT
Start: 2020-07-27 | End: 2020-07-28 | Stop reason: HOSPADM

## 2020-07-26 RX ORDER — ASPIRIN 81 MG/1
TABLET, CHEWABLE ORAL
Status: COMPLETED
Start: 2020-07-26 | End: 2020-07-26

## 2020-07-26 RX ORDER — ACETAMINOPHEN 325 MG/1
650 TABLET ORAL EVERY 4 HOURS PRN
Status: DISCONTINUED | OUTPATIENT
Start: 2020-07-26 | End: 2020-07-28 | Stop reason: HOSPADM

## 2020-07-26 RX ORDER — HYDRALAZINE HYDROCHLORIDE 20 MG/ML
5 INJECTION INTRAMUSCULAR; INTRAVENOUS EVERY 6 HOURS PRN
Status: DISCONTINUED | OUTPATIENT
Start: 2020-07-26 | End: 2020-07-27

## 2020-07-26 RX ADMIN — ATORVASTATIN CALCIUM 40 MG: 40 TABLET, FILM COATED ORAL at 21:15

## 2020-07-26 RX ADMIN — HYDRALAZINE HYDROCHLORIDE 5 MG: 20 INJECTION INTRAMUSCULAR; INTRAVENOUS at 21:16

## 2020-07-26 RX ADMIN — METOPROLOL TARTRATE 25 MG: 25 TABLET, FILM COATED ORAL at 19:33

## 2020-07-26 RX ADMIN — NITROGLYCERIN 0.4 MG: 0.4 TABLET SUBLINGUAL at 18:40

## 2020-07-26 RX ADMIN — ASPIRIN 81 MG 324 MG: 81 TABLET ORAL at 18:11

## 2020-07-26 NOTE — ED NOTES
Blood work and charting done by [facility], due to system downtime        Francine Shepherd RN  07/26/20 3028

## 2020-07-26 NOTE — ASSESSMENT & PLAN NOTE
Patient came with left-sided chest pain radiating to worse left arm, shoulder and neck  Pain started last night  Since pain comes and goes  In the emergency room EKG did not show any acute changes, troponin negative  However giving her risk factor including obesity, hypertension and hyperlipidemia, she was admitted for further evaluation  BETTE score 3  Will monitor on telemetry  Serial troponin to rule out ACS  Consult cardiology  Will get nuclear stress test  Given aspirin in the emergency room  Continue 81 mg daily  Check lipid panel tomorrow morning  Nitroglycerin p r n  Patient denies any shortness of breath, saturating well on room air

## 2020-07-26 NOTE — ED PROVIDER NOTES
History  Chief Complaint   Patient presents with    Chest Pain     Patient presents to ED with co left sided shoulder pain that radiates to arm, chest, and jaw  Symptoms started yesterday  Denies N/V/D   Jaw Pain     HPI  80 yo F with PMH obesity, htn, hld presents with chest pain starting yesterday, intermittent lasting about 20 minutes at a time  She states it is left sided and radiates to her left jaw and left arm  Tried heating packs without improvement  No SOB or leg swelling  No history of DVT or PE  Prior to Admission Medications   Prescriptions Last Dose Informant Patient Reported? Taking?   esomeprazole (NexIUM) 20 mg capsule   Yes No   Sig: Take 20 mg by mouth 2 (two) times a day      Facility-Administered Medications: None       Past Medical History:   Diagnosis Date    GERD (gastroesophageal reflux disease)     Obesity        Past Surgical History:   Procedure Laterality Date     SECTION      ESOPHAGOGASTRODUODENOSCOPY      OVARIAN CYST REMOVAL         Family History   Problem Relation Age of Onset    Hypertension Mother     Arthritis Mother     Heart disease Father      I have reviewed and agree with the history as documented  E-Cigarette/Vaping    E-Cigarette Use Never User      E-Cigarette/Vaping Substances     Social History     Tobacco Use    Smoking status: Never Smoker    Smokeless tobacco: Never Used   Substance Use Topics    Alcohol use: Never     Frequency: Never    Drug use: Never       Review of Systems   Constitutional: Negative for chills and fever  HENT: Negative for dental problem and ear pain  Eyes: Negative for pain and redness  Respiratory: Negative for cough and shortness of breath  Cardiovascular: Positive for chest pain  Negative for palpitations  Gastrointestinal: Negative for abdominal pain and nausea  Endocrine: Negative for polydipsia and polyphagia  Genitourinary: Negative for dysuria and frequency     Musculoskeletal: Negative for arthralgias and joint swelling  Skin: Negative for color change and rash  Neurological: Negative for dizziness and headaches  Psychiatric/Behavioral: Negative for behavioral problems and confusion  All other systems reviewed and are negative  Physical Exam  Physical Exam   Constitutional: She is oriented to person, place, and time  She appears well-developed and well-nourished  No distress  HENT:   Head: Atraumatic  Right Ear: External ear normal    Left Ear: External ear normal    Nose: Nose normal    Eyes: Pupils are equal, round, and reactive to light  Conjunctivae and EOM are normal    Neck: Normal range of motion  Neck supple  No JVD present  Cardiovascular: Normal rate, regular rhythm and normal heart sounds  No murmur heard  Pulmonary/Chest: Effort normal and breath sounds normal  No respiratory distress  She has no wheezes  Abdominal: Soft  Bowel sounds are normal  She exhibits no distension  There is no tenderness  Musculoskeletal: Normal range of motion  She exhibits no edema  Neurological: She is alert and oriented to person, place, and time  No cranial nerve deficit  Skin: Skin is warm and dry  Capillary refill takes less than 2 seconds  She is not diaphoretic  Psychiatric: She has a normal mood and affect  Her behavior is normal    Nursing note and vitals reviewed        Vital Signs  ED Triage Vitals [07/26/20 1550]   Temperature Pulse Respirations Blood Pressure SpO2   98 1 °F (36 7 °C) 83 14 (!) 187/91 97 %      Temp Source Heart Rate Source Patient Position - Orthostatic VS BP Location FiO2 (%)   Oral Monitor Sitting Left arm --      Pain Score       8           Vitals:    07/26/20 1550 07/26/20 1830 07/26/20 1841   BP: (!) 187/91 (!) 220/107 (!) 205/95   Pulse: 83 60 67   Patient Position - Orthostatic VS: Sitting           Visual Acuity      ED Medications  Medications   pantoprazole (PROTONIX) EC tablet 40 mg (has no administration in time range)   hydrALAZINE (APRESOLINE) injection 5 mg (5 mg Intravenous Given 7/26/20 1842)   metoprolol tartrate (LOPRESSOR) tablet 25 mg (has no administration in time range)   aspirin 81 mg chewable tablet **ADS Override Pull** (324 mg  Given 7/26/20 1811)   nitroglycerin (NITROSTAT) SL tablet 0 4 mg (0 4 mg Sublingual Given 7/26/20 1840)       Diagnostic Studies  Results Reviewed     Procedure Component Value Units Date/Time    Troponin I [[id number]]  (Normal) Collected:  07/26/20 1709    Lab Status:  Final result Specimen:  Blood from Arm, Right Updated:  07/26/20 1747     Troponin I <0 02 ng/mL     Comprehensive metabolic panel [[id number]]  (Abnormal) Collected:  07/26/20 1709    Lab Status:  Final result Specimen:  Blood from Arm, Right Updated:  07/26/20 1744     Sodium 141 mmol/L      Potassium 3 9 mmol/L      Chloride 104 mmol/L      CO2 26 mmol/L      ANION GAP 11 mmol/L      BUN 17 mg/dL      Creatinine 0 96 mg/dL      Glucose 91 mg/dL      Calcium 9 2 mg/dL      AST 20 U/L      ALT 24 U/L      Alkaline Phosphatase 85 U/L      Total Protein 8 1 g/dL      Albumin 3 4 g/dL      Total Bilirubin 0 30 mg/dL      eGFR 61 ml/min/1 73sq m     Narrative:       Elva guidelines for Chronic Kidney Disease (CKD):     Stage 1 with normal or high GFR (GFR > 90 mL/min/1 73 square meters)    Stage 2 Mild CKD (GFR = 60-89 mL/min/1 73 square meters)    Stage 3A Moderate CKD (GFR = 45-59 mL/min/1 73 square meters)    Stage 3B Moderate CKD (GFR = 30-44 mL/min/1 73 square meters)    Stage 4 Severe CKD (GFR = 15-29 mL/min/1 73 square meters)    Stage 5 End Stage CKD (GFR <15 mL/min/1 73 square meters)  Note: GFR calculation is accurate only with a steady state creatinine    Lipase [[id number]]  (Normal) Collected:  07/26/20 1709    Lab Status:  Final result Specimen:  Blood from Arm, Right Updated:  07/26/20 1744     Lipase 150 u/L     CBC and differential [[id number]]  (Abnormal) Collected:  07/26/20 1709    Lab Status:  Final result Specimen:  Blood from Arm, Right Updated:  07/26/20 1732     WBC 7 05 Thousand/uL      RBC 4 77 Million/uL      Hemoglobin 13 8 g/dL      Hematocrit 43 1 %      MCV 90 fL      MCH 28 9 pg      MCHC 32 0 g/dL      RDW 15 0 %      MPV 9 1 fL      Platelets 027 Thousands/uL      nRBC 0 /100 WBCs      Neutrophils Relative 62 %      Immat GRANS % 0 %      Lymphocytes Relative 22 %      Monocytes Relative 8 %      Eosinophils Relative 7 %      Basophils Relative 1 %      Neutrophils Absolute 4 35 Thousands/µL      Immature Grans Absolute 0 03 Thousand/uL      Lymphocytes Absolute 1 58 Thousands/µL      Monocytes Absolute 0 54 Thousand/µL      Eosinophils Absolute 0 49 Thousand/µL      Basophils Absolute 0 06 Thousands/µL     Protime-INR [[id number]] Collected:  07/26/20 1709    Lab Status:  No result Specimen:  Blood from Arm, Right     APTT [[id number]] Collected:  07/26/20 1709    Lab Status:  No result Specimen:  Blood from Arm, Right                  XR chest pa & lateral    (Results Pending)              Procedures  ECG 12 Lead Documentation Only  Date/Time: 7/26/2020 5:47 PM  Performed by: Sergio Villalpando MD  Authorized by: Sergio Villalpando MD     Comments:      NSR rate of 87, normal axis and intervals no acute ST elevations or depressions             ED Course       US AUDIT      Most Recent Value   Initial Alcohol Screen: US AUDIT-C    1   How often do you have a drink containing alcohol?  0 Filed at: 07/26/2020 1552   Audit-C Score  0 Filed at: 07/26/2020 1552            HEART Risk Score      Most Recent Value   Heart Score Risk Calculator   History  0 Filed at: 07/26/2020 1734   ECG  0 Filed at: 07/26/2020 1734   Age  2 Filed at: 07/26/2020 1734   Risk Factors  2 Filed at: 07/26/2020 1734   Troponin  0 Filed at: 07/26/2020 1734   HEART Score  4 Filed at: 07/26/2020 1734        Identification of Seniors at Risk      Most Recent Value   (ISAR) Identification of Seniors at Risk   Before the illness or injury that brought you to the Emergency, did you need someone to help you on a regular basis? 0 Filed at: 07/26/2020 1553   In the last 24 hours, have you needed more help than usual?  0 Filed at: 07/26/2020 1553   Have you been hospitalized for one or more nights during the past 6 months?  --   In general, do you see well?  0 Filed at: 07/26/2020 1553   In general, do you have serious problems with your memory? 0 Filed at: 07/26/2020 1553   Do you take more than three different medications every day? 1 Filed at: 07/26/2020 1553   ISAR Score  1 Filed at: 07/26/2020 1553          TOMMY/DAST-10      Most Recent Value   How many times in the past year have you    Used an illegal drug or used a prescription medication for non-medical reasons? Never Filed at: 07/26/2020 1552                  BETTE Risk Score      Most Recent Value   Age >= 72  1 Filed at: 07/26/2020 1815   Known CAD (stenosis >= 50%)  0 Filed at: 07/26/2020 1815   Recent (<=24 hrs) Service Angina  1 Filed at: 07/26/2020 1815   ST Deviation >= 0 5 mm  0 Filed at: 07/26/2020 1815   3+ CAD Risk Factors (FHx, HTN, HLP, DM, Smoker)  1 Filed at: 07/26/2020 1815   Aspirin Use Past 7 Days  0 Filed at: 07/26/2020 1815   Elevated Cardiac Markers  0 Filed at: 07/26/2020 1815   BETTE Risk Score (Calculated)  3 Filed at: 07/26/2020 1815                    MDM    Patient presents with chest pain, multiple cardiac risk factors, ekg and trop negative, cxr unremarkable  Will admit for further workup and management/acs rule out    Disposition  Final diagnoses:   Chest pain     Time reflects when diagnosis was documented in both MDM as applicable and the Disposition within this note     Time User Action Codes Description Comment    7/26/2020  5:48 PM Pedro Pablo Hicks [R07 [id number]] Chest pain       ED Disposition     ED Disposition Condition Date/Time Comment    Admit Stable Sun Jul 26, 2020  5:48 PM Case was discussed with Dr Fidel Blanco and the patient's admission status was agreed to be Admission Status: observation status to the service of Dr Gucci Moon   Follow-up Information    None         Patient's Medications   Discharge Prescriptions    No medications on file     No discharge procedures on file      PDMP Review     None          ED Provider  Electronically Signed by           Deedee Richardson MD  07/26/20 6500

## 2020-07-26 NOTE — H&P
H&P- Marine Taylor 1950, 81 y o  female MRN: [id number]    Unit/Bed#: ED 29 Encounter: [id number]    Primary Care Provider: Bela Romo MD   Date and time admitted to hospital: 7/26/2020  4:43 PM        Hypertension  Assessment & Plan  Patient used to take medication before  Currently not taking any medication  In the emergency room blood pressure 187/ 91 mm Hg  Uncontrolled  Start on metoprolol 25 mg b i d   Monitor blood pressure closely  Hydralazine p r n  Obesity, morbid ([address] )  Assessment & Plan  BMI 44  Weight loss counseling given  Nutritionist evaluation      GERD (gastroesophageal reflux disease)  Assessment & Plan  Continue PPI    * Chest pain  Assessment & Plan  Patient came with left-sided chest pain radiating to worse left arm, shoulder and neck  Pain started last night  Since pain comes and goes  In the emergency room EKG did not show any acute changes, troponin negative  However giving her risk factor including obesity, hypertension and hyperlipidemia, she was admitted for further evaluation  BETTE score 3  Will monitor on telemetry  Serial troponin to rule out ACS  Consult cardiology  Will get nuclear stress test  Given aspirin in the emergency room  Continue 81 mg daily  Check lipid panel tomorrow morning  Nitroglycerin p r n  Patient denies any shortness of breath, saturating well on room air  VTE Prophylaxis: Enoxaparin (Lovenox)  / sequential compression device   Code Status:  Full code  POLST: POLST is not applicable to this patient  Discussion with family:  The patient    Anticipated Length of Stay:  Patient will be admitted on an Observation basis with an anticipated length of stay of less than 2 midnights  Justification for Hospital Stay:  See above    Total Time for Visit, including Counseling / Coordination of Care: Suzy Gordon Greater than 50% of this total time spent on direct patient counseling and coordination of care      Chief Complaint:   Chest pain    History of Present Illness:    Ethel Braga is a 81 y o  female who presents with chest pain since last night  Pain started last night a left-side of the chest, radiating towards left shoulder, arm and jaw  7/10 in intensity yesterday  Pain resolved after 10/15 minutes  Since then patient having the pain off and on multiple times throughout the day today  After the pain started she tried to use massege which did not help  Patient decided to come to emergency room today  In the emergency room EKG shows sinus rhythm at 87 beats per minute, troponin negative  Patient denies any shortness of breath, nausea or vomiting  With her given risk factors she was admitted for further evaluation  In the emergency room she was given 325 mg of aspirin  Chest pain much better now  Patient used to take blood pressure medications before  But now she does not have any PCP and she does not take any medications  Blood pressure very high in the emergency room  She has vertigo which is chronic for her  No new dizziness, weakness, numbness or tingling  Afebrile  Review of Systems:    Review of Systems   Constitutional: Negative for chills and fever  HENT: Negative for ear pain, nosebleeds, sneezing, sore throat, tinnitus and voice change  Eyes: Negative for pain and visual disturbance  Respiratory: Negative for cough, chest tightness, shortness of breath and wheezing  Cardiovascular: Positive for chest pain  Negative for palpitations and leg swelling  Gastrointestinal: Negative for abdominal distention, abdominal pain, blood in stool, nausea and vomiting  Endocrine: Negative for cold intolerance  Genitourinary: Negative for dysuria, flank pain, frequency, hematuria and urgency  Musculoskeletal: Negative for back pain and joint swelling  Skin: Negative for pallor and rash  Neurological: Positive for dizziness  Negative for speech difficulty, light-headedness, numbness and headaches  Psychiatric/Behavioral: Negative for agitation and confusion  All other systems reviewed and are negative  Past Medical and Surgical History:     Past Medical History:   Diagnosis Date    GERD (gastroesophageal reflux disease)     Obesity        Past Surgical History:   Procedure Laterality Date     SECTION      ESOPHAGOGASTRODUODENOSCOPY      OVARIAN CYST REMOVAL         Meds/Allergies:    Prior to Admission medications    Medication Sig Start Date End Date Taking? Authorizing Provider   esomeprazole (NexIUM) 20 mg capsule Take 20 mg by mouth 2 (two) times a day    Historical Provider, MD     I have reviewed home medications with patient personally  Allergies:    Allergies   Allergen Reactions    Penicillins      dizziness    Latex Rash       Social History:     Marital Status: /Civil Union   Occupation:   Patient Pre-hospital Living Situation:   Patient Pre-hospital Level of Mobility:   Patient Pre-hospital Diet Restrictions:   Substance Use History:   Social History     Substance and Sexual Activity   Alcohol Use Never    Frequency: Never     Social History     Tobacco Use   Smoking Status Never Smoker   Smokeless Tobacco Never Used     Social History     Substance and Sexual Activity   Drug Use Never       Family History:    Family History   Problem Relation Age of Onset    Hypertension Mother     Arthritis Mother     Heart disease Father        Physical Exam:     Vitals:   Blood Pressure: (!) 187/91 (20 1550)  Pulse: 83 (20 1550)  Temperature: 98 1 °F (36 7 °C) (20 1550)  Temp Source: Oral (20 1550)  Respirations: 14 (20 1550)  Height: 5' 3" (160 cm) (20 1550)  Weight - Scale: 113 kg (250 lb) (20 1550)  SpO2: 97 % (20 1550)    Physical Exam    General- Awake, alert and oriented x 3, looks comfortable  HEENT- Normocephalic, atraumatic, oral mucosa- moist  Neck- Supple, no JVD  CVS- Normal S1/ S2, Regular rate and rhythm, No edema  Respiratory system- B/L clear breath sounds, no wheezing  Abdomen- Soft, Non distended, no tenderness, Bowel sound- present  Genitourinary- No suprapubic tenderness, No CVA tenderness  Skin- No new bruise or rash  Musculoskeletal- No gross deformity  Psych- No acute psychosis  CNS- CN II- XII grossly intact, No acute focal neurologic deficit noted    Additional Data:     Lab Results: I have personally reviewed pertinent reports  Results from last 7 days   Lab Units 07/26/20  1709   WBC Thousand/uL 7 05   HEMOGLOBIN g/dL 13 8   HEMATOCRIT % 43 1   PLATELETS Thousands/uL 307   NEUTROS PCT % 62   LYMPHS PCT % 22   MONOS PCT % 8   EOS PCT % 7*     Results from last 7 days   Lab Units 07/26/20  1709   SODIUM mmol/L 141   POTASSIUM mmol/L 3 9   CHLORIDE mmol/L 104   CO2 mmol/L 26   BUN mg/dL 17   CREATININE mg/dL 0 96   ANION GAP mmol/L 11   CALCIUM mg/dL 9 2   ALBUMIN g/dL 3 4*   TOTAL BILIRUBIN mg/dL 0 30   ALK PHOS U/L 85   ALT U/L 24   AST U/L 20   GLUCOSE RANDOM mg/dL 91                       Imaging: I have personally reviewed pertinent reports  XR chest pa & lateral    (Results Pending)       EKG, Pathology, and Other Studies Reviewed on Admission:   · EKG:  Sinus rhythm at 87 beats per minute    Allscripts / Epic Records Reviewed: Yes     ** Please Note: This note has been constructed using a voice recognition system   **

## 2020-07-26 NOTE — ASSESSMENT & PLAN NOTE
Patient used to take medication before  Currently not taking any medication  In the emergency room blood pressure 187/ 91 mm Hg  Uncontrolled  Start on metoprolol 25 mg b i d   Monitor blood pressure closely  Hydralazine p r n

## 2020-07-27 ENCOUNTER — APPOINTMENT (OUTPATIENT)
Dept: NUCLEAR MEDICINE | Facility: HOSPITAL | Age: 70
DRG: 287 | End: 2020-07-27
Payer: COMMERCIAL

## 2020-07-27 ENCOUNTER — APPOINTMENT (OUTPATIENT)
Dept: NON INVASIVE DIAGNOSTICS | Facility: HOSPITAL | Age: 70
DRG: 287 | End: 2020-07-27
Payer: COMMERCIAL

## 2020-07-27 LAB
ANION GAP SERPL CALCULATED.3IONS-SCNC: 10 MMOL/L (ref 4–13)
ANION GAP SERPL CALCULATED.3IONS-SCNC: 9 MMOL/L (ref 4–13)
ARRHY DURING EX: NORMAL
ATRIAL RATE: 60 BPM
ATRIAL RATE: 63 BPM
ATRIAL RATE: 87 BPM
BASOPHILS # BLD AUTO: 0.05 THOUSANDS/ΜL (ref 0–0.1)
BASOPHILS NFR BLD AUTO: 1 % (ref 0–1)
BUN SERPL-MCNC: 13 MG/DL (ref 5–25)
BUN SERPL-MCNC: 15 MG/DL (ref 5–25)
CALCIUM SERPL-MCNC: 8.8 MG/DL (ref 8.3–10.1)
CALCIUM SERPL-MCNC: 9 MG/DL (ref 8.3–10.1)
CHEST PAIN STATEMENT: NORMAL
CHLORIDE SERPL-SCNC: 104 MMOL/L (ref 100–108)
CHLORIDE SERPL-SCNC: 108 MMOL/L (ref 100–108)
CHOLEST SERPL-MCNC: 297 MG/DL (ref 50–200)
CO2 SERPL-SCNC: 26 MMOL/L (ref 21–32)
CO2 SERPL-SCNC: 26 MMOL/L (ref 21–32)
CREAT SERPL-MCNC: 0.89 MG/DL (ref 0.6–1.3)
CREAT SERPL-MCNC: 1.03 MG/DL (ref 0.6–1.3)
EOSINOPHIL # BLD AUTO: 0.59 THOUSAND/ΜL (ref 0–0.61)
EOSINOPHIL NFR BLD AUTO: 8 % (ref 0–6)
ERYTHROCYTE [DISTWIDTH] IN BLOOD BY AUTOMATED COUNT: 15.3 % (ref 11.6–15.1)
EST. AVERAGE GLUCOSE BLD GHB EST-MCNC: 105 MG/DL
GFR SERPL CREATININE-BSD FRML MDRD: 56 ML/MIN/1.73SQ M
GFR SERPL CREATININE-BSD FRML MDRD: 67 ML/MIN/1.73SQ M
GLUCOSE P FAST SERPL-MCNC: 83 MG/DL (ref 65–99)
GLUCOSE SERPL-MCNC: 83 MG/DL (ref 65–140)
GLUCOSE SERPL-MCNC: 99 MG/DL (ref 65–140)
HBA1C MFR BLD: 5.3 %
HCT VFR BLD AUTO: 41.8 % (ref 34.8–46.1)
HDLC SERPL-MCNC: 74 MG/DL
HGB BLD-MCNC: 13.1 G/DL (ref 11.5–15.4)
IMM GRANULOCYTES # BLD AUTO: 0.01 THOUSAND/UL (ref 0–0.2)
IMM GRANULOCYTES NFR BLD AUTO: 0 % (ref 0–2)
LDLC SERPL CALC-MCNC: 213 MG/DL (ref 0–100)
LYMPHOCYTES # BLD AUTO: 1.61 THOUSANDS/ΜL (ref 0.6–4.47)
LYMPHOCYTES NFR BLD AUTO: 22 % (ref 14–44)
MAX DIASTOLIC BP: 96 MMHG
MAX HEART RATE: 97 BPM
MAX PREDICTED HEART RATE: 153 BPM
MAX. SYSTOLIC BP: 196 MMHG
MCH RBC QN AUTO: 29.1 PG (ref 26.8–34.3)
MCHC RBC AUTO-ENTMCNC: 31.3 G/DL (ref 31.4–37.4)
MCV RBC AUTO: 93 FL (ref 82–98)
MONOCYTES # BLD AUTO: 0.72 THOUSAND/ΜL (ref 0.17–1.22)
MONOCYTES NFR BLD AUTO: 10 % (ref 4–12)
NEUTROPHILS # BLD AUTO: 4.23 THOUSANDS/ΜL (ref 1.85–7.62)
NEUTS SEG NFR BLD AUTO: 59 % (ref 43–75)
NONHDLC SERPL-MCNC: 223 MG/DL
NRBC BLD AUTO-RTO: 0 /100 WBCS
P AXIS: 55 DEGREES
P AXIS: 57 DEGREES
P AXIS: 58 DEGREES
PLATELET # BLD AUTO: 266 THOUSANDS/UL (ref 149–390)
PMV BLD AUTO: 9.1 FL (ref 8.9–12.7)
POTASSIUM SERPL-SCNC: 3.6 MMOL/L (ref 3.5–5.3)
POTASSIUM SERPL-SCNC: 3.6 MMOL/L (ref 3.5–5.3)
PR INTERVAL: 150 MS
PR INTERVAL: 176 MS
PR INTERVAL: 190 MS
PROTOCOL NAME: NORMAL
QRS AXIS: 25 DEGREES
QRS AXIS: 29 DEGREES
QRS AXIS: 39 DEGREES
QRSD INTERVAL: 84 MS
QRSD INTERVAL: 88 MS
QRSD INTERVAL: 90 MS
QT INTERVAL: 376 MS
QT INTERVAL: 434 MS
QT INTERVAL: 444 MS
QTC INTERVAL: 444 MS
QTC INTERVAL: 444 MS
QTC INTERVAL: 452 MS
RBC # BLD AUTO: 4.5 MILLION/UL (ref 3.81–5.12)
REASON FOR TERMINATION: NORMAL
SODIUM SERPL-SCNC: 140 MMOL/L (ref 136–145)
SODIUM SERPL-SCNC: 143 MMOL/L (ref 136–145)
T WAVE AXIS: 45 DEGREES
T WAVE AXIS: 51 DEGREES
T WAVE AXIS: 53 DEGREES
TARGET HR FORMULA: NORMAL
TEST INDICATION: NORMAL
TIME IN EXERCISE PHASE: NORMAL
TRIGL SERPL-MCNC: 49 MG/DL
TROPONIN I SERPL-MCNC: <0.02 NG/ML
VENTRICULAR RATE: 60 BPM
VENTRICULAR RATE: 63 BPM
VENTRICULAR RATE: 87 BPM
WBC # BLD AUTO: 7.21 THOUSAND/UL (ref 4.31–10.16)

## 2020-07-27 PROCEDURE — A9502 TC99M TETROFOSMIN: HCPCS

## 2020-07-27 PROCEDURE — 80061 LIPID PANEL: CPT | Performed by: INTERNAL MEDICINE

## 2020-07-27 PROCEDURE — 93018 CV STRESS TEST I&R ONLY: CPT | Performed by: INTERNAL MEDICINE

## 2020-07-27 PROCEDURE — 99223 1ST HOSP IP/OBS HIGH 75: CPT | Performed by: INTERNAL MEDICINE

## 2020-07-27 PROCEDURE — 85025 COMPLETE CBC W/AUTO DIFF WBC: CPT | Performed by: INTERNAL MEDICINE

## 2020-07-27 PROCEDURE — 93016 CV STRESS TEST SUPVJ ONLY: CPT | Performed by: INTERNAL MEDICINE

## 2020-07-27 PROCEDURE — 99233 SBSQ HOSP IP/OBS HIGH 50: CPT | Performed by: INTERNAL MEDICINE

## 2020-07-27 PROCEDURE — 78452 HT MUSCLE IMAGE SPECT MULT: CPT

## 2020-07-27 PROCEDURE — 78452 HT MUSCLE IMAGE SPECT MULT: CPT | Performed by: INTERNAL MEDICINE

## 2020-07-27 PROCEDURE — 93010 ELECTROCARDIOGRAM REPORT: CPT | Performed by: INTERNAL MEDICINE

## 2020-07-27 PROCEDURE — 93005 ELECTROCARDIOGRAM TRACING: CPT

## 2020-07-27 PROCEDURE — 80048 BASIC METABOLIC PNL TOTAL CA: CPT | Performed by: INTERNAL MEDICINE

## 2020-07-27 PROCEDURE — 93017 CV STRESS TEST TRACING ONLY: CPT

## 2020-07-27 PROCEDURE — 80048 BASIC METABOLIC PNL TOTAL CA: CPT | Performed by: PHYSICIAN ASSISTANT

## 2020-07-27 PROCEDURE — 83036 HEMOGLOBIN GLYCOSYLATED A1C: CPT | Performed by: INTERNAL MEDICINE

## 2020-07-27 PROCEDURE — 84484 ASSAY OF TROPONIN QUANT: CPT | Performed by: INTERNAL MEDICINE

## 2020-07-27 PROCEDURE — 36415 COLL VENOUS BLD VENIPUNCTURE: CPT | Performed by: INTERNAL MEDICINE

## 2020-07-27 RX ORDER — LANOLIN ALCOHOL/MO/W.PET/CERES
3 CREAM (GRAM) TOPICAL
Status: DISCONTINUED | OUTPATIENT
Start: 2020-07-27 | End: 2020-07-28 | Stop reason: HOSPADM

## 2020-07-27 RX ORDER — HYDRALAZINE HYDROCHLORIDE 20 MG/ML
5 INJECTION INTRAMUSCULAR; INTRAVENOUS ONCE
Status: COMPLETED | OUTPATIENT
Start: 2020-07-27 | End: 2020-07-27

## 2020-07-27 RX ORDER — ACETAMINOPHEN 325 MG/1
325 TABLET ORAL ONCE
Status: DISCONTINUED | OUTPATIENT
Start: 2020-07-27 | End: 2020-07-28 | Stop reason: HOSPADM

## 2020-07-27 RX ORDER — LISINOPRIL 10 MG/1
10 TABLET ORAL DAILY
Status: DISCONTINUED | OUTPATIENT
Start: 2020-07-27 | End: 2020-07-28 | Stop reason: HOSPADM

## 2020-07-27 RX ORDER — HYDRALAZINE HYDROCHLORIDE 20 MG/ML
5 INJECTION INTRAMUSCULAR; INTRAVENOUS EVERY 6 HOURS PRN
Status: DISCONTINUED | OUTPATIENT
Start: 2020-07-27 | End: 2020-07-28 | Stop reason: HOSPADM

## 2020-07-27 RX ORDER — LABETALOL 20 MG/4 ML (5 MG/ML) INTRAVENOUS SYRINGE
10 ONCE
Status: COMPLETED | OUTPATIENT
Start: 2020-07-27 | End: 2020-07-27

## 2020-07-27 RX ORDER — CARVEDILOL 12.5 MG/1
12.5 TABLET ORAL 2 TIMES DAILY WITH MEALS
Status: DISCONTINUED | OUTPATIENT
Start: 2020-07-27 | End: 2020-07-28 | Stop reason: HOSPADM

## 2020-07-27 RX ADMIN — LABETALOL 20 MG/4 ML (5 MG/ML) INTRAVENOUS SYRINGE 10 MG: at 15:06

## 2020-07-27 RX ADMIN — LISINOPRIL 10 MG: 10 TABLET ORAL at 15:36

## 2020-07-27 RX ADMIN — ACETAMINOPHEN 650 MG: 325 TABLET, FILM COATED ORAL at 03:15

## 2020-07-27 RX ADMIN — PANTOPRAZOLE SODIUM 40 MG: 40 TABLET, DELAYED RELEASE ORAL at 06:10

## 2020-07-27 RX ADMIN — REGADENOSON 0.4 MG: 0.08 INJECTION, SOLUTION INTRAVENOUS at 10:16

## 2020-07-27 RX ADMIN — ASPIRIN 81 MG 81 MG: 81 TABLET ORAL at 11:57

## 2020-07-27 RX ADMIN — HYDRALAZINE HYDROCHLORIDE 5 MG: 20 INJECTION INTRAMUSCULAR; INTRAVENOUS at 01:39

## 2020-07-27 RX ADMIN — ATORVASTATIN CALCIUM 40 MG: 40 TABLET, FILM COATED ORAL at 17:43

## 2020-07-27 RX ADMIN — METOPROLOL TARTRATE 25 MG: 25 TABLET, FILM COATED ORAL at 11:56

## 2020-07-27 RX ADMIN — CARVEDILOL 12.5 MG: 12.5 TABLET, FILM COATED ORAL at 15:36

## 2020-07-27 NOTE — ASSESSMENT & PLAN NOTE
Patient came with left-sided chest pain radiating to worse left arm, shoulder and neck  Pain started last night  Since pain comes and goes  Patient does have abnormal stress test and thus factors including obesity, hypertension  Given aspirin in the emergency room  Continue 81 mg daily    Will need cardiac catheterization and therefore I am changing her to full admit status

## 2020-07-27 NOTE — PLAN OF CARE
Problem: Potential for Falls  Goal: Patient will remain free of falls  Description  INTERVENTIONS:  - Assess patient frequently for physical needs  -  Identify cognitive and physical deficits and behaviors that affect risk of falls    -  [facility] fall precautions as indicated by assessment   - Educate patient/family on patient safety including physical limitations  - Instruct patient to call for assistance with activity based on assessment  - Modify environment to reduce risk of injury  - Consider OT/PT consult to assist with strengthening/mobility  Outcome: Progressing     Problem: PAIN - ADULT  Goal: Verbalizes/displays adequate comfort level or baseline comfort level  Description  Interventions:  - Encourage patient to monitor pain and request assistance  - Assess pain using appropriate pain scale  - Administer analgesics based on type and severity of pain and evaluate response  - Implement non-pharmacological measures as appropriate and evaluate response  - Consider cultural and social influences on pain and pain management  - Notify physician/advanced practitioner if interventions unsuccessful or patient reports new pain  Outcome: Progressing     Problem: INFECTION - ADULT  Goal: Absence or prevention of progression during hospitalization  Description  INTERVENTIONS:  - Assess and monitor for signs and symptoms of infection  - Monitor lab/diagnostic results  - Monitor all insertion sites, i e  indwelling lines, tubes, and drains  - Monitor endotracheal if appropriate and nasal secretions for changes in amount and color  - [facility] appropriate cooling/warming therapies per order  - Administer medications as ordered  - Instruct and encourage patient and family to use good hand hygiene technique  - Identify and instruct in appropriate isolation precautions for identified infection/condition  Outcome: Progressing  Goal: Absence of fever/infection during neutropenic period  Description  INTERVENTIONS:  - Monitor WBC    Outcome: Progressing     Problem: SAFETY ADULT  Goal: Patient will remain free of falls  Description  INTERVENTIONS:  - Assess patient frequently for physical needs  -  Identify cognitive and physical deficits and behaviors that affect risk of falls    -  [place] fall precautions as indicated by assessment   - Educate patient/family on patient safety including physical limitations  - Instruct patient to call for assistance with activity based on assessment  - Modify environment to reduce risk of injury  - Consider OT/PT consult to assist with strengthening/mobility  Outcome: Progressing  Goal: Maintain or return to baseline ADL function  Description  INTERVENTIONS:  -  Assess patient's ability to carry out ADLs; assess patient's baseline for ADL function and identify physical deficits which impact ability to perform ADLs (bathing, care of mouth/teeth, toileting, grooming, dressing, etc )  - Assess/evaluate cause of self-care deficits   - Assess range of motion  - Assess patient's mobility; develop plan if impaired  - Assess patient's need for assistive devices and provide as appropriate  - Encourage maximum independence but intervene and supervise when necessary  - Involve family in performance of ADLs  - Assess for home care needs following discharge   - Consider OT consult to assist with ADL evaluation and planning for discharge  - Provide patient education as appropriate  Outcome: Progressing  Goal: Maintain or return mobility status to optimal level  Description  INTERVENTIONS:  - Assess patient's baseline mobility status (ambulation, transfers, stairs, etc )    - Identify cognitive and physical deficits and behaviors that affect mobility  - Identify mobility aids required to assist with transfers and/or ambulation (gait belt, sit-to-stand, lift, walker, cane, etc )  - [place] fall precautions as indicated by assessment  - Record patient progress and toleration of activity level on Mobility SBAR; progress patient to next Phase/Stage  - Instruct patient to call for assistance with activity based on assessment  - Consider rehabilitation consult to assist with strengthening/weightbearing, etc   Outcome: Progressing     Problem: DISCHARGE PLANNING  Goal: Discharge to home or other facility with appropriate resources  Description  INTERVENTIONS:  - Identify barriers to discharge w/patient and caregiver  - Arrange for needed discharge resources and transportation as appropriate  - Identify discharge learning needs (meds, wound care, etc )  - Arrange for interpretive services to assist at discharge as needed  - Refer to Case Management Department for coordinating discharge planning if the patient needs post-hospital services based on physician/advanced practitioner order or complex needs related to functional status, cognitive ability, or social support system  Outcome: Progressing     Problem: Knowledge Deficit  Goal: Patient/family/caregiver demonstrates understanding of disease process, treatment plan, medications, and discharge instructions  Description  Complete learning assessment and assess knowledge base    Interventions:  - Provide teaching at level of understanding  - Provide teaching via preferred learning methods  Outcome: Progressing

## 2020-07-27 NOTE — SOCIAL WORK
LOS: 22 HRS  PATIENT IS NOT A BUNDLE  PATIENT IS NOT A READMISSION  CM met with patient at bedside, patient alert and oriented and seated at the edge of her bed  Patient accompanied by her spouse, Nette Leon  Patient lives with her spouse in a 2 story home in [place]  There are 4 or 5 TYREE and patient's bedroom is located on the second floor  Patient reports she's able to navigate her home without concern  Patient reports she has arthritis all over her body so sometimes she'll need to rely on Rodney Gonsalez to navigate the home but she's generally independent with all ADLs  Patient denies history of VNA/SNF  Patient utilizes [facility] mail order pharmacy or [facility] on [phone] for immediate/one time needs  Patient has Rx plan and is able to afford all copays  Patient denies history of MH/substance use  Patient denies POA/AD but reports she and her spouse are working on arranging this and she identifies her spouse Nette Leon as her HCR  Patient receives SSI and pension and relies on her spouse for transport  Spouse to transport home upon discharge  Patient reports that since moving to [address] [place] [address] from [place] 5 years ago, she never established with a permanent PCP  Patient would like PCP within [place] [address]  CM discussed [employer] and agreed to place their contact information on patient's AVS  Cardiology came in towards the end of CM assessment; plan is for cath tomorrow  No CM needs identified at this time  CM Dept will continue to follow patient through discharge  CM reviewed discharge planning process including the following: identifying caregivers at home, preference for d/c planning needs,   availability of treatment team to discuss questions or concerns patient and/or family may have regarding diagnosis, plan of care, old or new medications and discharge planning   CM will continue to follow for care coordination and update assessment as appropriate

## 2020-07-27 NOTE — ED NOTES
Pt's food tray arrived  Pt independent in set up and eating       Esme Martin, MARY JO  07/27/20 7962

## 2020-07-27 NOTE — ED NOTES
Patient got up to move to a chair because she was uncomfortable and had another onset of pain in her left jaw, the back of her head, and across her shoulders  The patient's BP is increased since her initial arrival, and has been hypertensive despite hydralazine  An EKG was performed, and [facility] was notified via [employer] with EKG images sent       Iam Morris RN  07/26/20 0292

## 2020-07-27 NOTE — PROGRESS NOTES
Progress Note - Roseann Michael 1950, 81 y o  female MRN: [id number]    Unit/Bed#: -01 Encounter: [id number]    Primary Care Provider: Jessenia Costa MD   Date and time admitted to hospital: 7/26/2020  4:43 PM        * Chest pain  Assessment & Plan  Patient came with left-sided chest pain radiating to worse left arm, shoulder and neck  Pain started last night  Since pain comes and goes  Patient does have abnormal stress test and thus factors including obesity, hypertension  Given aspirin in the emergency room  Continue 81 mg daily  Will need cardiac catheterization and therefore I am changing her to full admit status    Hypertension  Assessment & Plan  Patient used to take medication before  Currently not taking any medication at home  Will give beta-blockers and lisinopril has been added    Obesity, morbid (HCC)  Assessment & Plan  BMI 44  Weight loss counseling given during hospitalization  Nutritionist evaluation      GERD (gastroesophageal reflux disease)  Assessment & Plan  Continue PPI          TE Pharmacologic Prophylaxis: Enoxaparin (Lovenox)    Patient Centered Rounds: I have performed bedside rounds with nursing staff today  Discussions with Specialists or Other Care Team Provider:  Cardiology  Education and Discussions with Family / Patient:  Patient    Time Spent for Care: 35 mins  More than 50% of total time spent on counseling and coordination of care as described above  Current Length of Stay: 0 day(s)  Current Patient Status: Inpatient     Certification Statement: The patient will continue to require additional inpatient hospital stay due to Chest pain  Discharge Plan / Estimated Discharge Date:  1-2 days depending on cardiac catheterization    Code Status: Level 1 - Full Code  ______________________________________________________________________________    Subjective:   Patient seen and examined by me    Patient came in with chest pain and at this point of time she states that she is better  No nausea or vomiting associated with the same  Patient does not have any palpitations or diaphoresis  Does not have any high fever right now  Patient did have a stress test earlier today that was abnormal and therefore she is being made into full admit status for cardiac catheterization tomorrow  Input from Cardiology appreciated    Objective:   Vitals: Blood pressure 167/91, pulse 77, temperature 98 2 °F (36 8 °C), temperature source Oral, resp  rate 18, height 5' 3" (1 6 m), weight 114 kg (251 lb 1 7 oz), SpO2 98 %, not currently breastfeeding      Physical Exam:   General appearance: alert, appears stated age and cooperative  Constitutional- looks a little weak  HEENT - atraumatic and normocephalic  Neck- supple  Skin - no fresh rash  Extremities no fresh focal deformities  Cardiovascular- S1-S2 heard  Respiratory- bilateral air entry present, no crackles or rhonchi  Skin - no fresh rash  Abdomen - normal bowel sounds present, no rebound tenderness  CNS- No fresh focal deficits  Psych- no acute psychosis     Additional Data:   Labs:  Results from last 7 days   Lab Units 07/27/20  0629 07/26/20 2121 07/26/20  1709   WBC Thousand/uL 7 21  --  7 05   HEMOGLOBIN g/dL 13 1  --  13 8   HEMATOCRIT % 41 8  --  43 1   MCV fL 93  --  90   PLATELETS Thousands/uL 266 276 307   INR   --   --  0 90     Results from last 7 days   Lab Units 07/27/20  1718 07/27/20  0629 07/26/20  1709   SODIUM mmol/L 140 143 141   POTASSIUM mmol/L 3 6 3 6 3 9   CHLORIDE mmol/L 104 108 104   CO2 mmol/L 26 26 26   ANION GAP mmol/L 10 9 11   BUN mg/dL 13 15 17   CREATININE mg/dL 1 03 0 89 0 96   CALCIUM mg/dL 9 0 8 8 9 2   ALBUMIN g/dL  --   --  3 4*   TOTAL BILIRUBIN mg/dL  --   --  0 30   ALK PHOS U/L  --   --  85   ALT U/L  --   --  24   AST U/L  --   --  20   EGFR ml/min/1 73sq m 56 67 61   GLUCOSE RANDOM mg/dL 99 83 91         Results from last 7 days   Lab Units 07/27/20  0014 07/26/20 2121 07/26/20  1709   TROPONIN I ng/mL <0 02 <0 02 <0 02                          * I Have Reviewed All Lab Data Listed Above  Cultures:               Imaging:  Imaging Reports Reviewed Today Include:   Xr Chest Pa & Lateral    Result Date: 7/27/2020  Impression: No acute cardiopulmonary disease  Workstation performed: [id number]     Scheduled Meds:  Current Facility-Administered Medications:  acetaminophen 325 mg Oral Once Katie Morris PA-C   acetaminophen 650 mg Oral Q4H PRN Johnny Neves MD   aspirin 81 mg Oral Daily Johnny Neves MD   atorvastatin 40 mg Oral QPM Johnny Neves MD   carvedilol 12 5 mg Oral BID With Meals Shabnam Perea MD   enoxaparin 40 mg Subcutaneous Daily Johnny Neves MD   hydrALAZINE 5 mg Intravenous Q6H PRN Pilar Naranjo PA-C   lisinopril 10 mg Oral Daily Shabnam Perea MD   nitroglycerin 0 4 mg Sublingual Q5 Min PRN Johnny Neves MD   pantoprazole 40 mg Oral Early Morning MD Fidelina Last MD  [facility]    ** Please Note: This note has been constructed using a voice recognition system   **

## 2020-07-27 NOTE — UTILIZATION REVIEW
Initial Clinical Review    OBS order 7/26 1744 converted to IP on 7/27 @ 1823 for abnormal stress test requiring cardiac cath     Admitting Physician Karyle Schlein, [facility]    Level of Care Med Surg    Estimated length of stay More than 2 Midnights    Certification I certify that inpatient services are medically necessary for this patient for a duration of greater than two midnights  See H&P and MD Progress Notes for additional information about the patient's course of treatment  ED Arrival Information     Expected Arrival Acuity Means of Arrival Escorted By Service Admission Type    - 7/26/2020 15:39 Emergent Walk-In Spouse General Medicine Emergency    Arrival Complaint    Shoulder Pain,Jaw Pain         Chief Complaint   Patient presents with    Chest Pain     Patient presents to ED with co left sided shoulder pain that radiates to arm, chest, and jaw  Symptoms started yesterday  Denies N/V/D   Jaw Pain     Assessment/Plan: 82 yo female to ED from home w/ CP since last night   Radiates to L shoulder and jaw  EKG w/o changes and trop neg in ED   BP elevated   Admitted OBS status placed on tele , serial trop , consult cardiology , nuclear stress test , cont asa, check lipid panel , ntg prn       7/27 IM note   Admitted w/ CP , had abnormal stress test and is for cardiac cath on 7/28  Cont asa, tele   Monitor BP - BB and add lisinopril  7/27 Cardiology consult   Atypical CP Left shoulder pain; however radiating to left jaw; she has other CAD risk factors such as HTN, HLD, morbid obesity and age  Abnormal stress test   Cardiac cath 7/28, cardiac diet , NPO s/p MN   Cont asa, statin , DC lopressor start carvedilol , add lisinopril   Labetalol x1 and monitor BP   Tele , TTE , hgb a1c       ED Triage Vitals [07/26/20 1550]   Temperature Pulse Respirations Blood Pressure SpO2   98 1 °F (36 7 °C) 83 14 (!) 187/91 97 %      Temp Source Heart Rate Source Patient Position - Orthostatic VS BP Location FiO2 (%) Oral Monitor Sitting Left arm --      Pain Score       8        Wt Readings from Last 1 Encounters:   07/26/20 113 kg (250 lb)     Additional Vital Signs:   07/27/20 0700  --  85  19  161/78  112  97 %  None (Room air)  Lying   07/27/20 0600  --  62  27Abnormal   165/79  113  100 %  None (Room air)  --   07/27/20 0500  --  65  17  163/79  114  97 %  None (Room air)  --   07/27/20 0400  --  --  --  166/77  111  98 %  None (Room air)  Lying   07/27/20 0130  --  63  20  178/83Abnormal   119  95 %  --  --   07/27/20 0100  --  73  15  179/86Abnormal   123  98 %  --  --   07/27/20 0015  --  60  15  178/80Abnormal   115  99 %  None (Room air)  Sitting   07/26/20 2330  --  86  13  211/73Abnormal   128  98 %  None (Room air)  Sitting   07/26/20 2323  --  68  --  227/104Abnormal   --  98 %  --  Lying   07/26/20 2116  --  --  --  196/101Abnormal   --  --  --  --   07/26/20 2000  --  68  23Abnormal   179/85Abnormal   124  98 %  None (Room air)  --   07/26/20 1933  --  66  --  --  --  --  --  --   07/26/20 1930  --  61  27Abnormal   190/88Abnormal   127  97 %  None (Room air)  Lying   07/26/20 1915  --  57  12  191/91Abnormal   130  98 %  --  --   07/26/20 1900  --  59  12  189/91Abnormal   130  97 %  --  --   07/26/20 1846  --  76  18  171/80Abnormal   --  98 %  None (Room air)  --   07/26/20 1841  --  67  18  205/95Abnormal   --  100 %  None (Room air)  --   07/26/20 1830  --  60  --  220/107Abnormal   151  100 %  None (Room air         Pertinent Labs/Diagnostic Test Results:   7/26 EKG NSR  7/27 STress test - Cannot exclude a small amount of inferior and anterior ischemia  Left ventricular function was preserved    Results from last 7 days   Lab Units 07/27/20  0629 07/26/20  2121 07/26/20  1709   WBC Thousand/uL 7 21  --  7 05   HEMOGLOBIN g/dL 13 1  --  13 8   HEMATOCRIT % 41 8  --  43 1   PLATELETS Thousands/uL 266 276 307   NEUTROS ABS Thousands/µL 4 23  --  4 35     Results from last 7 days   Lab Units 07/27/20  1560 07/26/20  1709   SODIUM mmol/L 143 141   POTASSIUM mmol/L 3 6 3 9   CHLORIDE mmol/L 108 104   CO2 mmol/L 26 26   ANION GAP mmol/L 9 11   BUN mg/dL 15 17   CREATININE mg/dL 0 89 0 96   EGFR ml/min/1 73sq m 67 61   CALCIUM mg/dL 8 8 9 2     Results from last 7 days   Lab Units 07/26/20  1709   AST U/L 20   ALT U/L 24   ALK PHOS U/L 85   TOTAL PROTEIN g/dL 8 1   ALBUMIN g/dL 3 4*   TOTAL BILIRUBIN mg/dL 0 30     Results from last 7 days   Lab Units 07/27/20  0629 07/26/20  1709   GLUCOSE RANDOM mg/dL 83 91     Results from last 7 days   Lab Units 07/27/20  0014 07/26/20  2121 07/26/20  1709   TROPONIN I ng/mL <0 02 <0 02 <0 02     Results from last 7 days   Lab Units 07/26/20  1709   PROTIME seconds 12 4   INR  0 90   PTT seconds 30     Results from last 7 days   Lab Units 07/26/20  1709   LIPASE u/L 150     ED Treatment:   Medication Administration from 07/26/2020 1538 to 07/27/2020 0732       Date/Time Order Dose Route Action     07/26/2020 1811 aspirin 81 mg chewable tablet **ADS Override Pull** 324 mg  Given     07/26/2020 1840 nitroglycerin (NITROSTAT) SL tablet 0 4 mg 0 4 mg Sublingual Given     07/27/2020 0610 pantoprazole (PROTONIX) EC tablet 40 mg 40 mg Oral Given     07/26/2020 2115 atorvastatin (LIPITOR) tablet 40 mg 40 mg Oral Given     07/27/2020 0315 acetaminophen (TYLENOL) tablet 650 mg 650 mg Oral Given     07/26/2020 2116 hydrALAZINE (APRESOLINE) injection 5 mg 5 mg Intravenous Given     07/26/2020 1933 metoprolol tartrate (LOPRESSOR) tablet 25 mg 25 mg Oral Given     07/27/2020 0139 hydrALAZINE (APRESOLINE) injection 5 mg 5 mg Intravenous Given        Past Medical History:   Diagnosis Date    GERD (gastroesophageal reflux disease)     Obesity      Present on Admission:   Chest pain   GERD (gastroesophageal reflux disease)   Obesity, morbid ([facility] [address] )   Hypertension      Admitting Diagnosis: No admission diagnoses are documented for this encounter    Age/Sex: 81 y o  female  Admission Orders:  Scheduled Medications:    Medications:  acetaminophen 325 mg Oral Once   aspirin 81 mg Oral Daily   atorvastatin 40 mg Oral QPM   enoxaparin 40 mg Subcutaneous Daily   metoprolol tartrate 25 mg Oral Q12H [address]   pantoprazole 40 mg Oral Early Morning     Continuous IV Infusions:     PRN Meds:    acetaminophen 650 mg Oral Q4H PRN   hydrALAZINE 5 mg Intravenous Q6H PRN   nitroglycerin 0 4 mg Sublingual Q5 Min PRN     Tele   Up and OOB   Cardiac diet     IP CONSULT TO CARDIOLOGY    Network Utilization Review Department  [email or web address]  ATTENTION: Please call with any questions or concerns to [phone] and carefully listen to the prompts so that you are directed to the right person  All voicemails are confidential   [facility] all requests for admission clinical reviews, approved or denied determinations and any other requests to dedicated fax number below belonging to the campus where the patient is receiving treatment   List of dedicated fax numbers for the Facilities:  [address] DENIALS (Administrative/Medical Necessity) [phone]   [address] (Maternity/NICU/Pediatrics) [phone]   Nata Maurer [phone]   [facility] [phone]   [facility] [phone]   Amanda Conrad [phone]   [address] [facility] [address] [facility] [phone]   [facility]  [phone]   [address] [facility] [address]  [facility] [address] [facility] [phone]

## 2020-07-27 NOTE — ED NOTES
1  CC - pt c/o SOB, chest pain, b/l shoulder pain  Pt admitted for chest pain obs pending negative stress test  Per cardiology abnormal stress test, cath to be completed tomorrow  2  Admission related to injury? - no    3  Orientation status - A&Ox4, GCS 15    4  Abnormal labs/abnormal focused assessment/vitals - negative troponin x3  Pt's BP elevated - 226/176    5  Medications/drips - given 10mg IV lopressor, coreg, and lisinopril     6  Last time narcotics given - n/a    7  IV lines/drains/etc - 20 R AC    8  Isolation status - standard    9  Skin - intact    10  Ambulation - independent    11   ED nurse's name/# - [address] [facility] [address], RN  07/27/20 1612

## 2020-07-27 NOTE — ASSESSMENT & PLAN NOTE
Patient used to take medication before  Currently not taking any medication at home    Will give beta-blockers and lisinopril has been added

## 2020-07-27 NOTE — ED NOTES
Pt's  came to nursing station in regards to pt's wait time to see providers  Pt informed of pending stress test  Pt's providers made aware, awaiting further evaluation       Jania Blood RN  07/27/20 0150

## 2020-07-27 NOTE — ED NOTES
Pt OOB and ambulated to bathroom without assistance, using steady gait        Tha Rosales, RN  07/27/20 4435

## 2020-07-27 NOTE — ED NOTES
Patient complaining of severe pain in the back of her neck, head and her jaw  Her blood pressure is elevated as well  As per RN, another EKG was done            Caren Tavera  07/26/20 7502

## 2020-07-27 NOTE — ED NOTES
Pt OOB and ambulated to bathroom, using steady gait, without assistance  Pt denies pain at this time, states she was given coffee after stress test and her pain is now relieved       Miguel Artis RN  07/27/20 4206

## 2020-07-27 NOTE — CONSULTS
Consultation - Cardiology   Eura Mortimer 81 y o  female MRN: [id number]  Unit/Bed#: ED 29 Encounter: [id number]    Assessment/Plan     Assessment:  1  Atypical chest pain, ACS ruled out  -Left shoulder pain; however radiating to left jaw; she has other CAD risk factors such as HTN, HLD, morbid obesity and age  -EKG and troponin without signs of ACS  2  Abnormal NM stress testing  3  Hypertensive urgency possibly due to medication noncompliance + anxiety  -Not on antihypertensives at home despite Hx of HTN  4  Hyperlipidemia   -LDL >190; TC elevated    Plan:  -Given abnormal stress test, will schedule her for cardiac cath tomorrow morning  Cardiac diet today; NPO s/p midnight  -Continue aspirin, high intensity statin; discontinue metoprolol and start Carvedilol 12 5mg bid + Lisinopril 10mg qd  Administer Labetalol 10mg IV x1 now; follow BP closely  Continuous cardiac monitoring    -Order TTE  -Order Hgb A1c        History of Present Illness   Physician Requesting Consult: Adonis Puente MD  Reason for Consult / Principal Problem: Chest pain    HPI: Eura Mortimer is a 81y o  year old female with a history significant for morbid obesity, osteoarthritis, GERD and hypertension who presents with left shoulder, sharp pain radiating around the left arm to the left scapula and jaw/neck which began about 2-3 days ago, intermittent in nature, exacerbated by left shoulder movement, no alleviating factors; no change with rest or nitroglycerin  Denies nausea, vomiting, palpitations, recent heavy lifting, SOB, cough, prior episodes  Father suffered an MI at a late age  Does have some mild tenderness in the same area  Has not followed up with a PCP or cardiology recently  Has never undergone stress testing or cardiac catheterization       Inpatient consult to Cardiology  Consult performed by: Ji Pritchett MD  Consult ordered by: Adonis Puente MD          Inpatient consult to Cardiology     Performed by  Marlene Soto Emma Sierra MD     Authorized by Spencer Mendez MD              Review of Systems   Constitutional: Positive for activity change (decrease) and fatigue  Negative for chills, diaphoresis and fever  HENT: Negative for congestion  Respiratory: Negative for cough, chest tightness, shortness of breath, wheezing and stridor  Cardiovascular: Positive for chest pain  Negative for palpitations and leg swelling  Gastrointestinal: Negative for abdominal pain, blood in stool, nausea and vomiting  Genitourinary: Negative for difficulty urinating  Musculoskeletal: Positive for arthralgias (left shoulder) and back pain (chronic)  Negative for joint swelling  Neurological: Positive for headaches (bilateral)  Negative for tremors, syncope, weakness, light-headedness and numbness  Historical Information   Past Medical History:   Diagnosis Date    GERD (gastroesophageal reflux disease)     Obesity      Past Surgical History:   Procedure Laterality Date     SECTION      ESOPHAGOGASTRODUODENOSCOPY      OVARIAN CYST REMOVAL       Social History     Substance and Sexual Activity   Alcohol Use Never    Frequency: Never     Social History     Substance and Sexual Activity   Drug Use Never     E-Cigarette/Vaping    E-Cigarette Use Never User      E-Cigarette/Vaping Substances     Social History     Tobacco Use   Smoking Status Never Smoker   Smokeless Tobacco Never Used     Family History: non-contributory    Meds/Allergies   all current active meds have been reviewed  Allergies   Allergen Reactions    Penicillins      dizziness    Latex Rash       Objective   Vitals: Blood pressure (!) 180/86, pulse 66, temperature 98 1 °F (36 7 °C), temperature source Oral, resp  rate 21, height 5' 3" (1 6 m), weight 113 kg (250 lb), SpO2 98 %, not currently breastfeeding    Orthostatic Blood Pressures      Most Recent Value   Blood Pressure  (!) 180/86 filed at 2020 1536   Patient Position - Orthostatic VS Sitting filed at 07/27/2020 1536          No intake or output data in the 24 hours ending 07/27/20 1603    Invasive Devices     Peripheral Intravenous Line            Peripheral IV 07/26/20 Right Antecubital less than 1 day                Physical Exam   Constitutional: She is oriented to person, place, and time  She appears well-developed and well-nourished  No distress  HENT:   Head: Normocephalic and atraumatic  Eyes: Pupils are equal, round, and reactive to light  Conjunctivae are normal  No scleral icterus  Neck: Neck supple  No JVD present  Cardiovascular: Normal rate, regular rhythm, S1 normal and S2 normal  Exam reveals no gallop and no friction rub  Murmur (holosystolic, grade 2/6 murmur loudest in 2nd right ICS; no radiation to carotid) heard  Tenderness in L shoulder palpation noted   Pulmonary/Chest: Breath sounds normal  She has no wheezes  She has no rales  She exhibits no tenderness  Abdominal: Soft  She exhibits no distension  There is no tenderness  Musculoskeletal: She exhibits no edema  Neurological: She is alert and oriented to person, place, and time  Skin: Skin is warm and dry  Capillary refill takes less than 2 seconds  She is not diaphoretic  No erythema  Lab Results: I have personally reviewed pertinent lab results  Imaging: I have personally reviewed pertinent reports      EKG: reviewed, NSR  VTE Prophylaxis: Enoxaparin (Lovenox)    Code Status: Level 1 - Full Code

## 2020-07-28 ENCOUNTER — APPOINTMENT (INPATIENT)
Dept: INTERVENTIONAL RADIOLOGY/VASCULAR | Facility: HOSPITAL | Age: 70
DRG: 287 | End: 2020-07-28
Payer: COMMERCIAL

## 2020-07-28 ENCOUNTER — APPOINTMENT (INPATIENT)
Dept: NON INVASIVE DIAGNOSTICS | Facility: HOSPITAL | Age: 70
DRG: 287 | End: 2020-07-28
Payer: COMMERCIAL

## 2020-07-28 VITALS
SYSTOLIC BLOOD PRESSURE: 147 MMHG | RESPIRATION RATE: 16 BRPM | BODY MASS INDEX: 46.09 KG/M2 | HEART RATE: 64 BPM | WEIGHT: 260.14 LBS | HEIGHT: 63 IN | TEMPERATURE: 98.9 F | DIASTOLIC BLOOD PRESSURE: 84 MMHG | OXYGEN SATURATION: 97 %

## 2020-07-28 PROBLEM — M25.512 CHRONIC LEFT SHOULDER PAIN: Status: ACTIVE | Noted: 2020-07-28

## 2020-07-28 PROBLEM — G89.29 CHRONIC LEFT SHOULDER PAIN: Status: ACTIVE | Noted: 2020-07-28

## 2020-07-28 LAB
ATRIAL RATE: 63 BPM
P AXIS: 65 DEGREES
PR INTERVAL: 176 MS
QRS AXIS: 39 DEGREES
QRSD INTERVAL: 86 MS
QT INTERVAL: 448 MS
QTC INTERVAL: 458 MS
T WAVE AXIS: 36 DEGREES
VENTRICULAR RATE: 63 BPM

## 2020-07-28 PROCEDURE — 93010 ELECTROCARDIOGRAM REPORT: CPT | Performed by: INTERNAL MEDICINE

## 2020-07-28 PROCEDURE — 99232 SBSQ HOSP IP/OBS MODERATE 35: CPT | Performed by: INTERNAL MEDICINE

## 2020-07-28 PROCEDURE — 4A023N7 MEASUREMENT OF CARDIAC SAMPLING AND PRESSURE, LEFT HEART, PERCUTANEOUS APPROACH: ICD-10-PCS | Performed by: INTERNAL MEDICINE

## 2020-07-28 PROCEDURE — 99239 HOSP IP/OBS DSCHRG MGMT >30: CPT | Performed by: STUDENT IN AN ORGANIZED HEALTH CARE EDUCATION/TRAINING PROGRAM

## 2020-07-28 PROCEDURE — C1894 INTRO/SHEATH, NON-LASER: HCPCS | Performed by: PHYSICIAN ASSISTANT

## 2020-07-28 PROCEDURE — 93306 TTE W/DOPPLER COMPLETE: CPT

## 2020-07-28 PROCEDURE — 93458 L HRT ARTERY/VENTRICLE ANGIO: CPT | Performed by: PHYSICIAN ASSISTANT

## 2020-07-28 PROCEDURE — 93306 TTE W/DOPPLER COMPLETE: CPT | Performed by: INTERNAL MEDICINE

## 2020-07-28 PROCEDURE — B2111ZZ FLUOROSCOPY OF MULTIPLE CORONARY ARTERIES USING LOW OSMOLAR CONTRAST: ICD-10-PCS | Performed by: INTERNAL MEDICINE

## 2020-07-28 PROCEDURE — 99152 MOD SED SAME PHYS/QHP 5/>YRS: CPT | Performed by: INTERNAL MEDICINE

## 2020-07-28 PROCEDURE — 93458 L HRT ARTERY/VENTRICLE ANGIO: CPT | Performed by: INTERNAL MEDICINE

## 2020-07-28 PROCEDURE — C1769 GUIDE WIRE: HCPCS | Performed by: PHYSICIAN ASSISTANT

## 2020-07-28 PROCEDURE — 99152 MOD SED SAME PHYS/QHP 5/>YRS: CPT | Performed by: PHYSICIAN ASSISTANT

## 2020-07-28 RX ORDER — ACETAMINOPHEN 325 MG/1
650 TABLET ORAL EVERY 4 HOURS PRN
Qty: 30 TABLET | Refills: 0 | Status: SHIPPED | OUTPATIENT
Start: 2020-07-28 | End: 2022-06-08 | Stop reason: ALTCHOICE

## 2020-07-28 RX ORDER — LISINOPRIL 10 MG/1
10 TABLET ORAL DAILY
Qty: 30 TABLET | Refills: 0 | Status: SHIPPED | OUTPATIENT
Start: 2020-07-29 | End: 2020-08-04 | Stop reason: SDUPTHER

## 2020-07-28 RX ORDER — HEPARIN SODIUM 1000 [USP'U]/ML
INJECTION, SOLUTION INTRAVENOUS; SUBCUTANEOUS CODE/TRAUMA/SEDATION MEDICATION
Status: COMPLETED | OUTPATIENT
Start: 2020-07-28 | End: 2020-07-28

## 2020-07-28 RX ORDER — MIDAZOLAM HYDROCHLORIDE 2 MG/2ML
INJECTION, SOLUTION INTRAMUSCULAR; INTRAVENOUS CODE/TRAUMA/SEDATION MEDICATION
Status: COMPLETED | OUTPATIENT
Start: 2020-07-28 | End: 2020-07-28

## 2020-07-28 RX ORDER — NITROGLYCERIN 20 MG/100ML
INJECTION INTRAVENOUS CODE/TRAUMA/SEDATION MEDICATION
Status: COMPLETED | OUTPATIENT
Start: 2020-07-28 | End: 2020-07-28

## 2020-07-28 RX ORDER — NITROGLYCERIN 0.4 MG/1
0.4 TABLET SUBLINGUAL
Qty: 30 TABLET | Refills: 0 | Status: SHIPPED | OUTPATIENT
Start: 2020-07-28 | End: 2022-04-29

## 2020-07-28 RX ORDER — FENTANYL CITRATE 50 UG/ML
INJECTION, SOLUTION INTRAMUSCULAR; INTRAVENOUS CODE/TRAUMA/SEDATION MEDICATION
Status: COMPLETED | OUTPATIENT
Start: 2020-07-28 | End: 2020-07-28

## 2020-07-28 RX ORDER — ATORVASTATIN CALCIUM 40 MG/1
40 TABLET, FILM COATED ORAL EVERY EVENING
Qty: 30 TABLET | Refills: 0 | Status: SHIPPED | OUTPATIENT
Start: 2020-07-28 | End: 2020-08-04 | Stop reason: SDUPTHER

## 2020-07-28 RX ORDER — LIDOCAINE WITH 8.4% SOD BICARB 0.9%(10ML)
SYRINGE (ML) INJECTION CODE/TRAUMA/SEDATION MEDICATION
Status: COMPLETED | OUTPATIENT
Start: 2020-07-28 | End: 2020-07-28

## 2020-07-28 RX ORDER — VERAPAMIL HCL 2.5 MG/ML
AMPUL (ML) INTRAVENOUS CODE/TRAUMA/SEDATION MEDICATION
Status: COMPLETED | OUTPATIENT
Start: 2020-07-28 | End: 2020-07-28

## 2020-07-28 RX ORDER — CARVEDILOL 12.5 MG/1
12.5 TABLET ORAL 2 TIMES DAILY WITH MEALS
Qty: 60 TABLET | Refills: 0 | Status: SHIPPED | OUTPATIENT
Start: 2020-07-28 | End: 2020-08-04 | Stop reason: SDUPTHER

## 2020-07-28 RX ORDER — ASPIRIN 81 MG/1
81 TABLET, CHEWABLE ORAL DAILY
Qty: 30 TABLET | Refills: 0 | Status: SHIPPED | OUTPATIENT
Start: 2020-07-29 | End: 2022-06-08 | Stop reason: ALTCHOICE

## 2020-07-28 RX ADMIN — NITROGLYCERIN 200 MCG: 20 INJECTION INTRAVENOUS at 09:41

## 2020-07-28 RX ADMIN — HEPARIN SODIUM 5000 UNITS: 1000 INJECTION INTRAVENOUS; SUBCUTANEOUS at 09:44

## 2020-07-28 RX ADMIN — CARVEDILOL 12.5 MG: 12.5 TABLET, FILM COATED ORAL at 08:06

## 2020-07-28 RX ADMIN — IOHEXOL 40 ML: 350 INJECTION, SOLUTION INTRAVENOUS at 09:50

## 2020-07-28 RX ADMIN — VERAPAMIL HYDROCHLORIDE 2.5 MG: 2.5 INJECTION, SOLUTION INTRAVENOUS at 09:42

## 2020-07-28 RX ADMIN — PANTOPRAZOLE SODIUM 40 MG: 40 TABLET, DELAYED RELEASE ORAL at 05:55

## 2020-07-28 RX ADMIN — MIDAZOLAM HYDROCHLORIDE 1 MG: 1 INJECTION, SOLUTION INTRAMUSCULAR; INTRAVENOUS at 09:39

## 2020-07-28 RX ADMIN — FENTANYL CITRATE 25 MCG: 50 INJECTION, SOLUTION INTRAMUSCULAR; INTRAVENOUS at 09:39

## 2020-07-28 RX ADMIN — LISINOPRIL 10 MG: 10 TABLET ORAL at 11:21

## 2020-07-28 RX ADMIN — Medication 3 ML: at 09:39

## 2020-07-28 NOTE — ASSESSMENT & PLAN NOTE
· Chronic left shoulder pain, most pronounced in anteriorly   · Unable to abduct shoulder more than 45 degrees  · Noted to have tenderness in anterior and posterior aspects of left shoulder  · Pain from shoulder radiates to neck and jaw, which could have been contributing to concern for ACS event  · Suspecting osteoarthritis, vs bursitis vs rotator cuff injury  · Ordered MRI of left shoulder to be completed as an outpatient  · Referred to outpatient orthopedic surgery

## 2020-07-28 NOTE — DISCHARGE INSTRUCTIONS
After Radial Heart Catheterization   WHAT YOU NEED TO KNOW:   What will happen after a radial heart catheterization? · You will be attached to a heart monitor until you are fully awake  A heart monitor is an EKG that stays on continuously to record your heart's electrical activity  Healthcare providers will monitor your vital signs and pulses in your arm  They will frequently check your pressure bandage for bleeding or swelling  · You may have a band wrapped tightly around your wrist  The band puts pressure on your wound and helps prevent bleeding  A healthcare provider can put air into the band or remove air from the band  A healthcare provider will gradually remove air from the band and decrease pressure on your wrist  The band may be removed in 2 hours or when your wound stops bleeding  · You will need to keep your wrist straight for 2 to 4 hours  Do not  push or pull with your arm  Arm movements can cause serious bleeding  After you are monitored for several hours, you may go home or may need to stay in the hospital overnight  What do I need to know before I go home? · Care for your wound as directed  Remove the pressure bandage in 24 hours or as directed  Mild bruising is normal and expected  A small bandage can be placed on your wound after you remove the pressure bandage  Do not put powders, lotions, or creams on your wound  They may cause your wound to get infected  Monitor your wound every day for signs of infection, such as redness, swelling, or pus  · Shower the day after your procedure or as directed  Remove your pressure bandage before you shower  Do not take baths or go in hot tubs or pools  Carefully wash the wound with soap and water  Pat the area dry  A small bandage can be placed on your wound after you shower  · Apply firm, steady pressure to your wound if it bleeds  Apply pressure with a clean gauze or towel for 5 to 10 minutes   Call 911 if bleeding becomes heavy or does not stop  · Drink liquids as directed  Liquids will help flush the contrast liquid from your body  Ask how much liquid to drink each day and which liquids are best for you  · Do not lift anything heavier than 5 pounds until directed by your healthcare provider  Heavy lifting can put stress on your wound and cause bleeding  Do not push or pull with the arm that was used for the procedure  Do not do vigorous activity for at least 48 hours  Vigorous activity may cause bleeding from your wound  Rest and do quiet activities  Take short walks around the house to prevent a blood clot  Ask your healthcare provider when you can return to your normal activities  · Do not drive or return to work until your healthcare provider says it is okay  Your healthcare provider may tell you to wait 48 hours before you drive to decrease your risk for bleeding  You may not be able to return to work for at least 2 days after your procedure if your job involves heavy lifting  What medicines may I need? You may need any of the following:  · Blood thinners    help prevent blood clots  Examples of blood thinners include heparin and warfarin  Clots can cause strokes, heart attacks, and death  The following are general safety guidelines to follow while you are taking a blood thinner:    ¨ Watch for bleeding and bruising while you take blood thinners  Watch for bleeding from your gums or nose  Watch for blood in your urine and bowel movements  Use a soft washcloth on your skin, and a soft toothbrush to brush your teeth  This can keep your skin and gums from bleeding  If you shave, use an electric shaver  Do not play contact sports  ¨ Tell your dentist and other healthcare providers that you take anticoagulants  Wear a bracelet or necklace that says you take this medicine  ¨ Do not start or stop any medicines unless your healthcare provider tells you to  Many medicines cannot be used with blood thinners       ¨ Tell your healthcare provider right away if you forget to take the medicine, or if you take too much  ¨ Warfarin  is a blood thinner that you may need to take  The following are things you should be aware of if you take warfarin  § Foods and medicines can affect the amount of warfarin in your blood  Do not make major changes to your diet while you take warfarin  Warfarin works best when you eat about the same amount of vitamin K every day  Vitamin K is found in green leafy vegetables and certain other foods  Ask for more information about what to eat when you are taking warfarin  § You will need to see your healthcare provider for follow-up visits when you are on warfarin  You will need regular blood tests  These tests are used to decide how much medicine you need  · Acetaminophen  helps decrease pain and fever  This medicine is available without a doctor's order  Ask how much medicine is safe to take, and how often to take it  Acetaminophen can cause liver damage if not taken correctly  · Take your medicine as directed  Contact your healthcare provider if you think your medicine is not helping or if you have side effects  Tell him or her if you are allergic to any medicine  Keep a list of the medicines, vitamins, and herbs you take  Include the amounts, and when and why you take them  Bring the list or the pill bottles to follow-up visits  Carry your medicine list with you in case of an emergency    Call 911 for any of the following:   · You have any of the following signs of a heart attack:      ¨ Squeezing, pressure, or pain in your chest that lasts longer than 5 minutes or returns    ¨ Discomfort or pain in your back, neck, jaw, stomach, or arm     ¨ Trouble breathing    ¨ Nausea or vomiting    ¨ Lightheadedness or a sudden cold sweat, especially with chest pain or trouble breathing    · You have any of the following signs of a stroke:      ¨ Numbness or drooping on one side of your face ¨ Weakness in an arm or leg    ¨ Confusion or difficulty speaking    ¨ Dizziness, a severe headache, or vision loss    · You feel lightheaded, short of breath, and have chest pain  · You cough up blood  · You have trouble breathing  · You cannot stop the bleeding from your wound even after you hold firm pressure for 10 minutes  When should I seek immediate care? · Blood soaks through your bandage  · Your stitches come apart  · Your hand or arm feels numb, cool, or looks pale  · Your wound gets swollen quickly  When should I contact my healthcare provider? · You have a fever or chills  · Your wound is red, swollen, or draining pus  · Your wound looks more bruised or you have new bruising on the side of your wrist      · You have nausea or are vomiting  · Your skin is itchy, swollen, or you have a rash  · You have questions or concerns about your condition or care  CARE AGREEMENT:   You have the right to help plan your care  Learn about your health condition and how it may be treated  Discuss treatment options with your caregivers to decide what care you want to receive  You always have the right to refuse treatment  The above information is an  only  It is not intended as medical advice for individual conditions or treatments  Talk to your doctor, nurse or pharmacist before following any medical regimen to see if it is safe and effective for you  © 2017 [address] Information is for End User's use only and may not be sold, redistributed or otherwise used for commercial purposes  All illustrations and images included in CareNotes® are the copyrighted property of [employer]  or [employer]  Shoulder Pain   WHAT YOU NEED TO KNOW:   What do I need to know about shoulder pain? Shoulder pain is a common problem and can affect your daily activities  Pain can be caused by a problem within your shoulder   Shoulder pain may also be caused by pain that spreads to your shoulder from another part of your body  What increases my risk for shoulder pain? · Repeated overhead activities, such as baseball, weight lifting, or swimming    · Medical conditions, such as rotator cuff disease, diabetes, or thyroid disorders    · A quick increase in the amount or intensity of exercises, or improper technique during exercise    · Muscle imbalance or weakness    · Trauma or a fall    · Age older than 61  How is shoulder pain diagnosed? Your healthcare provider will ask about your pain, including how and when it started  Tell him if you have any weakness or if there was an injury  He will examine your shoulder and do movement tests to see how well you can move your shoulder  You may also need any of the following tests:  · An x-ray, ultrasound, CT, or MRI  may be needed to show the cause of your shoulder pain  You may be given contrast liquid to help the shoulder area show up better in the pictures  Tell the healthcare provider if you have ever had an allergic reaction to contrast dye  Do not enter the MRI room with anything metal  Metal can cause serious injury  Tell the healthcare provider if you have any metal in or on your body  · An electromyography test  measures the electrical activity of your muscles at rest and with movement  How is shoulder pain treated? · Acetaminophen  decreases pain and fever  It is available without a doctor's order  Ask how much to take and how often to take it  Follow directions  Acetaminophen can cause liver damage if not taken correctly  · NSAIDs , such as ibuprofen, help decrease swelling, pain, and fever  This medicine is available with or without a doctor's order  NSAIDs can cause stomach bleeding or kidney problems in certain people  If you take blood thinner medicine, always ask your healthcare provider if NSAIDs are safe for you  Always read the medicine label and follow directions      · A steroid injection  may help decrease pain and swelling  · Surgery  may be needed for long-term pain and loss of function  How can I manage my symptoms? · Apply ice  on your shoulder for 20 to 30 minutes every 2 hours or as directed  Use an ice pack, or put crushed ice in a plastic bag  Cover it with a towel  Ice helps prevent tissue damage and decreases swelling and pain  · Apply heat if ice does not help your symptoms  Apply heat on your shoulder for 20 to 30 minutes every 2 hours for as many days as directed  Heat helps decrease pain and muscle spasms  · Go to physical or occupational therapy as directed  A physical therapist teaches you exercises to help improve movement and strength, and to decrease pain  An occupational therapist teaches you skills to help with your daily activities  How can I help prevent shoulder pain? · Stretch and strengthen your shoulder  Use proper technique during exercises and sports  · Limit activities as directed  Try to avoid repeated overhead movements  When should I seek immediate care or call 911? · You have severe pain  · You cannot move your arm or shoulder  · You have numbness or tingling in your shoulder or arm  When should I contact my healthcare provider? · Your pain gets worse or does not go away with treatment  · You have trouble moving your arm or shoulder  · You have questions or concerns about your condition or care  CARE AGREEMENT:   You have the right to help plan your care  Learn about your health condition and how it may be treated  Discuss treatment options with your caregivers to decide what care you want to receive  You always have the right to refuse treatment  The above information is an  only  It is not intended as medical advice for individual conditions or treatments  Talk to your doctor, nurse or pharmacist before following any medical regimen to see if it is safe and effective for you    © 2017 [facility] [address] is for End User's use only and may not be sold, redistributed or otherwise used for commercial purposes  All illustrations and images included in CareNotes® are the copyrighted property of [employer]  or [employer]

## 2020-07-28 NOTE — PROGRESS NOTES
Removed 1 mL from TR band to r wrist, 11 mL remaining  No signs of bleeding  Mild swelling noted, but has not worsened since return from cardiac catheterization  Will continue to monitor

## 2020-07-28 NOTE — PLAN OF CARE
Problem: Potential for Falls  Goal: Patient will remain free of falls  Description  INTERVENTIONS:  - Assess patient frequently for physical needs  -  Identify cognitive and physical deficits and behaviors that affect risk of falls    -  [place] fall precautions as indicated by assessment   - Educate patient/family on patient safety including physical limitations  - Instruct patient to call for assistance with activity based on assessment  - Modify environment to reduce risk of injury  - Consider OT/PT consult to assist with strengthening/mobility  Outcome: Completed     Problem: PAIN - ADULT  Goal: Verbalizes/displays adequate comfort level or baseline comfort level  Description  Interventions:  - Encourage patient to monitor pain and request assistance  - Assess pain using appropriate pain scale  - Administer analgesics based on type and severity of pain and evaluate response  - Implement non-pharmacological measures as appropriate and evaluate response  - Consider cultural and social influences on pain and pain management  - Notify physician/advanced practitioner if interventions unsuccessful or patient reports new pain  Outcome: Completed     Problem: INFECTION - ADULT  Goal: Absence or prevention of progression during hospitalization  Description  INTERVENTIONS:  - Assess and monitor for signs and symptoms of infection  - Monitor lab/diagnostic results  - Monitor all insertion sites, i e  indwelling lines, tubes, and drains  - Monitor endotracheal if appropriate and nasal secretions for changes in amount and color  - [place] appropriate cooling/warming therapies per order  - Administer medications as ordered  - Instruct and encourage patient and family to use good hand hygiene technique  - Identify and instruct in appropriate isolation precautions for identified infection/condition  Outcome: Completed  Goal: Absence of fever/infection during neutropenic period  Description  INTERVENTIONS:  - Monitor WBC    Outcome: Completed     Problem: SAFETY ADULT  Goal: Patient will remain free of falls  Description  INTERVENTIONS:  - Assess patient frequently for physical needs  -  Identify cognitive and physical deficits and behaviors that affect risk of falls    -  [place] fall precautions as indicated by assessment   - Educate patient/family on patient safety including physical limitations  - Instruct patient to call for assistance with activity based on assessment  - Modify environment to reduce risk of injury  - Consider OT/PT consult to assist with strengthening/mobility  Outcome: Completed  Goal: Maintain or return to baseline ADL function  Description  INTERVENTIONS:  -  Assess patient's ability to carry out ADLs; assess patient's baseline for ADL function and identify physical deficits which impact ability to perform ADLs (bathing, care of mouth/teeth, toileting, grooming, dressing, etc )  - Assess/evaluate cause of self-care deficits   - Assess range of motion  - Assess patient's mobility; develop plan if impaired  - Assess patient's need for assistive devices and provide as appropriate  - Encourage maximum independence but intervene and supervise when necessary  - Involve family in performance of ADLs  - Assess for home care needs following discharge   - Consider OT consult to assist with ADL evaluation and planning for discharge  - Provide patient education as appropriate  Outcome: Completed  Goal: Maintain or return mobility status to optimal level  Description  INTERVENTIONS:  - Assess patient's baseline mobility status (ambulation, transfers, stairs, etc )    - Identify cognitive and physical deficits and behaviors that affect mobility  - Identify mobility aids required to assist with transfers and/or ambulation (gait belt, sit-to-stand, lift, walker, cane, etc )  - [place] fall precautions as indicated by assessment  - Record patient progress and toleration of activity level on Mobility SBAR; progress patient to next Phase/Stage  - Instruct patient to call for assistance with activity based on assessment  - Consider rehabilitation consult to assist with strengthening/weightbearing, etc   Outcome: Completed     Problem: DISCHARGE PLANNING  Goal: Discharge to home or other facility with appropriate resources  Description  INTERVENTIONS:  - Identify barriers to discharge w/patient and caregiver  - Arrange for needed discharge resources and transportation as appropriate  - Identify discharge learning needs (meds, wound care, etc )  - Arrange for interpretive services to assist at discharge as needed  - Refer to Case Management Department for coordinating discharge planning if the patient needs post-hospital services based on physician/advanced practitioner order or complex needs related to functional status, cognitive ability, or social support system  Outcome: Completed     Problem: Knowledge Deficit  Goal: Patient/family/caregiver demonstrates understanding of disease process, treatment plan, medications, and discharge instructions  Description  Complete learning assessment and assess knowledge base    Interventions:  - Provide teaching at level of understanding  - Provide teaching via preferred learning methods  Outcome: Completed

## 2020-07-28 NOTE — DISCHARGE SUMMARY
Discharge- Bong Martines 1950, 81 y o  female MRN: [id number]    Unit/Bed#: -01 Encounter: [id number]    Primary Care Provider: Ruby Werner MD   Date and time admitted to hospital: 7/26/2020  4:43 PM        Chronic left shoulder pain  Assessment & Plan  · Chronic left shoulder pain, most pronounced in anteriorly   · Unable to abduct shoulder more than 45 degrees  · Noted to have tenderness in anterior and posterior aspects of left shoulder  · Pain from shoulder radiates to neck and jaw, which could have been contributing to concern for ACS event  · Suspecting osteoarthritis, vs bursitis vs rotator cuff injury  · Ordered MRI of left shoulder to be completed as an outpatient  · Referred to outpatient orthopedic surgery     Hypertension  Assessment & Plan  · Normotensive, continue coreg and lisinopril     Obesity, morbid ([address] )  Assessment & Plan  · Counseled on lifestyle modifications    GERD (gastroesophageal reflux disease)  Assessment & Plan  · Continue PPI    * Chest pain  Assessment & Plan  · Combination of typical and atypical chest pain  · + Nuclear stress test, status post negative cardiac cath  · Atypical chest pain radiating to jaw appears to be originating in shoulder  · See plan for chronic shoulder pain for further details  · Discharge home with aspirin, lipitor, coreg, lisinopril, sl nitro      Discharging Physician / Practitioner: Anabell Hall MD  PCP: Ruby Werner MD  Admission Date:   Admission Orders (From admission, onward)     Ordered        07/27/20 1823  Inpatient Admission  Once         07/26/20 1749  Place in Observation (expected length of stay for this patient is less than two midnights)  Once                   Discharge Date: 07/28/20    Disposition:      Other: Home    For Discharges to [facility] SNF:   · Not Applicable to this Patient - Not Applicable to this Patient    Reason for Admission:  Chest pain, left-sided shoulder pain    Discharge Diagnoses: Please see assessment and plan section above for further details regarding discharge diagnoses  Resolved Problems  Date Reviewed: 7/26/2020    None          Consultations During Hospital Stay:  · Cardiology    Procedures Performed:   · Nuclear stress test, cardiac catheterization, imaging     Medication Adjustments and Discharge Medications:  · Summary of Medication Adjustments made as a result of this hospitalization:  See med rec  · Medication Dosing Tapers - Please refer to Discharge Medication List for details on any medication dosing tapers (if applicable to patient)  · Medications being temporarily held (include recommended restart time):  See med rec  · Discharge Medication List: See after visit summary for reconciled discharge medications  Wound Care Recommendations:  When applicable, please see wound care section of After Visit Summary  Diet Recommendations at Discharge:  Diet -        Diet Orders   (From admission, onward)             Start     Ordered    07/28/20 1113  Diet Cardiovascular; Cardiac  Diet effective now     Question Answer Comment   Diet Type Cardiovascular    Cardiac Cardiac    RD to adjust diet per protocol? Yes        07/28/20 1112                Instructions for any Catheters / Lines Present at Discharge (including removal date, if applicable):  NA    Significant Findings / Test Results:   · Positive nuclear stress test, negative cardiac catheterization    Incidental Findings:   · See above     Test Results Pending at Discharge (will require follow up):   · MRI left shoulder     Outpatient Tests Requested:  · See above    Complications:  None    Hospital Course:     Eura Mortimer is a 81 y o  female patient who originally presented to the hospital on 7/26/2020 due to left-sided chest pain radiating to jaw associated with left shoulder pain  Suspecting elements of both typical and atypical chest pain    Patient underwent cardiac catheterization secondary to positive nuclear stress test, cardiac catheterization negative  Patient is reporting left shoulder pain that specifically radiates to the jaw and left-sided chest   Suspecting potential rotator cuff injury versus bursitis versus osteoarthritis  Will order outpatient MRI and recommend follow-up with orthopedic surgery  Orthopedic surgery referral was ordered prior to discharge  Cleared from cardiology perspective for discharge  Condition at Discharge: good     Discharge Day Visit / Exam:     Subjective:  Patient feels well, but reports left shoulder pain  Denies any further episodes of chest pain  Denies any chest palpitations  Vitals: Blood Pressure: 147/84 (07/28/20 1530)  Pulse: 64 (07/28/20 1530)  Temperature: 98 9 °F (37 2 °C) (07/28/20 1530)  Temp Source: Oral (07/28/20 1530)  Respirations: 16 (07/28/20 1530)  Height: 5' 3" (160 cm) (07/27/20 1656)  Weight - Scale: 118 kg (260 lb 2 3 oz) (07/28/20 1113)  SpO2: 97 % (07/28/20 1530)  Exam:   Physical Exam   Constitutional: She is oriented to person, place, and time  She appears well-developed and well-nourished  HENT:   Head: Normocephalic and atraumatic  Eyes: Right eye exhibits no discharge  Left eye exhibits no discharge  Cardiovascular: Normal rate and regular rhythm  Pulmonary/Chest: Effort normal and breath sounds normal    Abdominal: Soft  Bowel sounds are normal    Musculoskeletal:   Range of motion on abduction of left shoulder, cannot abduct more than 45°  Tenderness to palpation of anterior and posterior aspects of left shoulder   Neurological: She is alert and oriented to person, place, and time  Skin: Skin is warm and dry  Psychiatric: She has a normal mood and affect   Her behavior is normal        Discussion with Family:   at bedside    Goals of Care Discussions:  · Code Status at Discharge: Level 1 - Full Code  · Were there any Goals of Care Discussions during Hospitalization?: No  · Results of any General Goals of Care Discussions: na   · POLST Completed: No   · If POLST Completed, Summary of POLST Agreement Provided Here: na   · OK to Rehospitalize if Needed? No    Discharge instructions/Information to patient and family:   See after visit summary section titled Discharge Instructions for information provided to patient and family  Planned Readmission: none      Discharge Statement:  I spent 30 minutes discharging the patient  This time was spent on the day of discharge  I had direct contact with the patient on the day of discharge  Greater than 50% of the total time was spent examining patient, answering all patient questions, arranging and discussing plan of care with patient as well as directly providing post-discharge instructions  Additional time then spent on discharge activities      ** Please Note: This note has been constructed using a voice recognition system **

## 2020-07-28 NOTE — PROGRESS NOTES
Removed 1 mL from TR band to r wrist, 10 mL remaining  No signs of bleeding   Will continue to monitor

## 2020-07-28 NOTE — UTILIZATION REVIEW
Notification of Inpatient Admission/Inpatient Authorization Request   This is a Notification of Inpatient Admission for [address]  Be advised that this patient was admitted to our facility under Inpatient Status  Contact Be Lockwood at [phone] for additional admission information  [facility] DEPT DEDICATED Servando Covarrubias [phone]  Patient Name:   Lauren Marrero   YOB: 1950       [address]   [address]:   [address]   Tax ID: [id number]  NPI: [id number] Attending Provider/NPI:  Phone:  Address: Lia Sutherland, [address] [[id number]]  [phone]  Same as JOSE/Nannette Almodov[address] of Service Code: 24     Place of Service Name:  [address] [place] [address]   Start Date: 7/27/20 1823 Discharge Date & Time: No discharge date for patient encounter  Type of Admission: Inpatient Status Discharge Disposition   (if discharged): Home/Self Care   Patient Diagnoses: Chest pain [R07 9]     Orders: Admission Orders (From admission, onward)     Ordered        07/27/20 1823  Inpatient Admission  Once         07/26/20 1749  Place in Observation (expected length of stay for this patient is less than two midnights)  Once                    Assigned Utilization Review Contact: Be Lockwood  Utilization   Network Utilization Review Department  Phone: [phone]; Fax [phone]  Email: [email or web address]   ATTENTION PAYERS: Please call the assigned Utilization  directly with any questions or concerns ALL voicemails in the department are confidential  Send all requests for admission clinical reviews, approved or denied determinations and any other requests to dedicated fax number belonging to the campus where the patient is receiving treatment

## 2020-07-28 NOTE — PROGRESS NOTES
TR band in place to R wrist, no noted abnormalities from previous note  Removed 1 mL with a remaining 13 mLs as per orders  No signs of bleeding at this time

## 2020-07-28 NOTE — ASSESSMENT & PLAN NOTE
· Combination of typical and atypical chest pain  · + Nuclear stress test, status post negative cardiac cath  · Atypical chest pain radiating to jaw appears to be originating in shoulder  · See plan for chronic shoulder pain for further details  · Discharge home with aspirin, lipitor, coreg, lisinopril, sl nitro

## 2020-07-28 NOTE — PROGRESS NOTES
Pt currently has TR band in place to R wrist  Distal pulses palpable  No complaint of pain or discomfort  Mild swelling noted to the forearm, will continue to monitor  No signs of bleeding at the site  Total of 15 mL in the TR band to be removed every 30 minutes until deflated as per orders  Prior nurse removed 1 mL leaving a total of 14mLs

## 2020-07-28 NOTE — PROGRESS NOTES
Contacted cardiology regarding swelling  Received verbal orders to remove 1 mL q15 mins  No signs of bleeding, pt has no tingling to her fingers and is able to move them  Swelling has improved to her forearm with removal of pressure in the TR band  SLIM at bedside, no concerns or further orders at this time

## 2020-07-28 NOTE — PROGRESS NOTES
Progress Note - Cardiology   Kranthi Pepper 81 y o  female MRN: [id number]  Unit/Bed#: -01 Encounter: [id number]    Assessment:  1  Unstable angina  -EKG and troponin without signs of injury  -Stress test showing small amount of anterior and inferior ischemia  2  Hypertensive urgency possibly due to medication noncompliance + anxiety - resolved  3  Hyperlipidemia; possible familial hyperlipidemia  -LDL >190  -A1c normal  4  Suspected aortic stenosis  -Characteristic murmur heard on P/E     Plan:  -Scheduled for cardiac catheterization later today; will follow after procedure  Aware of risks/benefits of procedure  -Continue aspirin, atorvastatin  -Continue Carvedilol 12 5mg bid + Lisinopril 10mg qd  May increase Lisinopril if BP remains uncontrolled throughout the day as heart rate near lower normal limit    -Continuous cardiac monitoring    -Follow TTE results      Subjective/Objective   Chief Complaint: Chest pain    Subjective: Pain has significantly improved compared to yesterday; however, she continues to feel anxious about upcoming procedure and was at times tearful during my encounter  Denies palpitations, SOB, cough, lower extremity swelling       Objective:     Vitals: /94 (BP Location: Left arm)   Pulse 65   Temp 97 8 °F (36 6 °C) (Oral)   Resp 16   Ht 5' 3" (1 6 m)   Wt 114 kg (251 lb 1 7 oz)   SpO2 96%   BMI 44 48 kg/m²   Vitals:    07/26/20 1550 07/27/20 1656   Weight: 113 kg (250 lb) 114 kg (251 lb 1 7 oz)     Orthostatic Blood Pressures      Most Recent Value   Blood Pressure  161/94 filed at 07/28/2020 2579   Patient Position - Orthostatic VS  Lying filed at 07/28/2020 6274            Intake/Output Summary (Last 24 hours) at 7/28/2020 0856  Last data filed at 7/28/2020 0643  Gross per 24 hour   Intake 620 ml   Output 2200 ml   Net -1580 ml       Invasive Devices     Peripheral Intravenous Line            Peripheral IV 07/27/20 Left Antecubital less than 1 day                Review of Systems: Negative except as mentioned above    Physical Exam:     Gen: elderly female in bed; room air  HEENT: NC/AT, PERRLA, no conjunctival pallor or scleral icterus, no JVD  Xanthelasma noted bilaterally  RS: CTA bilaterally  CVS:  RRR, S1-S2 heard, holosystolic murmur (grade 2) heard in 2nd RICS, no peripheral edema, radial pulses 2+, capillary refill less than 2 seconds  Abdomen:  Soft, NT/ND  MSK:  Warm  Neuro:  AAO x4  Psych:  Cooperative            Lab Results: I have personally reviewed pertinent lab results  Imaging: I have personally reviewed pertinent reports      EKG: none today  VTE Pharmacologic Prophylaxis: Enoxaparin (Lovenox)  VTE Mechanical Prophylaxis: sequential compression device

## 2020-07-29 ENCOUNTER — TELEPHONE (OUTPATIENT)
Dept: OBGYN CLINIC | Facility: HOSPITAL | Age: 70
End: 2020-07-29

## 2020-07-29 NOTE — UTILIZATION REVIEW
Notification of Discharge  This is a Notification of Discharge from our facility [address]  Please be advised that this patient has been discharge from our facility  Below you will find the admission and discharge date and time including the patients disposition  PRESENTATION DATE: 7/26/2020  4:43 PM  OBS ADMISSION DATE: 07/26/2020  IP ADMISSION DATE: 7/27/20 1823   DISCHARGE DATE: 7/28/2020  4:52 PM  DISPOSITION: Home/Self Care Home/Self Care   Admission Orders listed below:  Admission Orders (From admission, onward)     Ordered        07/27/20 1823  Inpatient Admission  Once         07/26/20 1749  Place in Observation (expected length of stay for this patient is less than two midnights)  Once                   Please contact the UR Department if additional information is required to close this patient's authorization/case  [facility] Utilization Review Department  Main: [phone] x carefully listen to the prompts  All voicemails are confidential   [email or web address]  Send all requests for admission clinical reviews, approved or denied determinations and any other requests to dedicated fax number below belonging to the campus where the patient is receiving treatment   List of dedicated fax numbers:  [address] [place] [address] DENIALS (Administrative/Medical Necessity) [phone]   [address]  (Maternity/NICU/Pediatrics) [phone]   [facility] [phone]   [facility] [phone]   Aramis Anderson [phone]   [facility] [address] [place] [address] [phone]   [facility]  [phone]   [address] [facility]  [address] [address] [facility] [phone]

## 2020-08-02 ENCOUNTER — HOSPITAL ENCOUNTER (OUTPATIENT)
Dept: MRI IMAGING | Facility: HOSPITAL | Age: 70
Discharge: HOME | End: 2020-08-02
Attending: STUDENT IN AN ORGANIZED HEALTH CARE EDUCATION/TRAINING PROGRAM
Payer: COMMERCIAL

## 2020-08-02 DIAGNOSIS — M25.512 CHRONIC LEFT SHOULDER PAIN: ICD-10-CM

## 2020-08-02 DIAGNOSIS — G89.29 CHRONIC LEFT SHOULDER PAIN: ICD-10-CM

## 2020-08-02 PROCEDURE — 73221 MRI JOINT UPR EXTREM W/O DYE: CPT

## 2020-08-03 ENCOUNTER — APPOINTMENT (OUTPATIENT)
Dept: LAB | Facility: HOSPITAL | Age: 70
End: 2020-08-03
Payer: COMMERCIAL

## 2020-08-03 ENCOUNTER — APPOINTMENT (OUTPATIENT)
Dept: RADIOLOGY | Facility: CLINIC | Age: 70
End: 2020-08-03
Payer: COMMERCIAL

## 2020-08-03 ENCOUNTER — OFFICE VISIT (OUTPATIENT)
Dept: OBGYN CLINIC | Facility: CLINIC | Age: 70
End: 2020-08-03
Payer: COMMERCIAL

## 2020-08-03 ENCOUNTER — TELEPHONE (OUTPATIENT)
Dept: OBGYN CLINIC | Facility: MEDICAL CENTER | Age: 70
End: 2020-08-03

## 2020-08-03 VITALS — BODY MASS INDEX: 47.66 KG/M2 | WEIGHT: 269 LBS | HEIGHT: 63 IN | RESPIRATION RATE: 20 BRPM

## 2020-08-03 DIAGNOSIS — M25.50 MULTIPLE JOINT PAIN: Primary | ICD-10-CM

## 2020-08-03 DIAGNOSIS — M25.50 MULTIPLE JOINT PAIN: ICD-10-CM

## 2020-08-03 DIAGNOSIS — M75.42 IMPINGEMENT SYNDROME OF LEFT SHOULDER: ICD-10-CM

## 2020-08-03 DIAGNOSIS — M75.41 IMPINGEMENT SYNDROME OF RIGHT SHOULDER: Primary | ICD-10-CM

## 2020-08-03 DIAGNOSIS — M25.512 LEFT SHOULDER PAIN, UNSPECIFIED CHRONICITY: ICD-10-CM

## 2020-08-03 LAB
CRP SERPL QL: 21.4 MG/L
ERYTHROCYTE [SEDIMENTATION RATE] IN BLOOD: 32 MM/HOUR (ref 0–29)

## 2020-08-03 PROCEDURE — 99203 OFFICE O/P NEW LOW 30 MIN: CPT | Performed by: ORTHOPAEDIC SURGERY

## 2020-08-03 PROCEDURE — 86430 RHEUMATOID FACTOR TEST QUAL: CPT

## 2020-08-03 PROCEDURE — 85652 RBC SED RATE AUTOMATED: CPT

## 2020-08-03 PROCEDURE — 36415 COLL VENOUS BLD VENIPUNCTURE: CPT

## 2020-08-03 PROCEDURE — 86140 C-REACTIVE PROTEIN: CPT

## 2020-08-03 PROCEDURE — 86618 LYME DISEASE ANTIBODY: CPT

## 2020-08-03 PROCEDURE — 86038 ANTINUCLEAR ANTIBODIES: CPT

## 2020-08-03 PROCEDURE — 73030 X-RAY EXAM OF SHOULDER: CPT

## 2020-08-03 PROCEDURE — 72052 X-RAY EXAM NECK SPINE 6/>VWS: CPT

## 2020-08-03 RX ORDER — PROPRANOLOL/HYDROCHLOROTHIAZID 40 MG-25MG
500 TABLET ORAL
COMMUNITY

## 2020-08-03 RX ORDER — VITAMIN B COMPLEX
1 CAPSULE ORAL DAILY
COMMUNITY

## 2020-08-03 RX ORDER — PHENOL 1.4 %
600 AEROSOL, SPRAY (ML) MUCOUS MEMBRANE 2 TIMES DAILY WITH MEALS
COMMUNITY

## 2020-08-03 RX ORDER — MULTIVITAMIN
TABLET ORAL
COMMUNITY

## 2020-08-03 RX ORDER — PNV NO.95/FERROUS FUM/FOLIC AC 28MG-0.8MG
TABLET ORAL
COMMUNITY

## 2020-08-03 RX ORDER — CYCLOSPORINE 0.5 MG/ML
1 EMULSION OPHTHALMIC
COMMUNITY
End: 2022-04-29

## 2020-08-03 RX ORDER — CHOLECALCIFEROL (VITAMIN D3) 25 MCG
1000 CAPSULE ORAL
COMMUNITY

## 2020-08-03 RX ORDER — VITAMIN B COMPLEX
TABLET ORAL
COMMUNITY

## 2020-08-03 NOTE — PROGRESS NOTES
Chief Complaint   Patient presents with    Left Shoulder - Pain, Clicking    Right Shoulder - Pain, Clicking             SUBJECTIVE:  Patient here today with complaints of bilateral shoulder pain  Patient was recently in the hospital for severe left shoulder pain  The left shoulder pain radiated into her neck and down her arm and was worked up for cardiac causes which were all negative  Patient states she has bilateral shoulder pain on and off for many years  She has flare-ups that can last up to a week in the improve  Usually Advil helps the best   No injury was reported  Today happens to be a "good day" regarding her shoulders  She states when the pain is severe the pain does radiate down the entire arm to her hands  She denies any numbness or tingling in the upper extremities  She also complains of intermittent knee pain with the right being worse than left, hip pain and back pain  Review of Systems:  Review of systems form reviewed August 3, 2020  General:   Negative for fever, chills and weight loss  Gastrointestinal:  No abdominal pain, no nausea, vomiting  Respiratory:   Negative for cough and shortness of breath  Endocrine:  No frequent urination  Musculoskeletal: See HPI  Cardiovascular:   Negative for chest pain and palpitations      Neurological:   Negative for dizziness, and numbness  All other Review of systems negative unless otherwise specified in HPI      Past Medical History:   Diagnosis Date    GERD (gastroesophageal reflux disease)     Obesity          Social History     Tobacco Use    Smoking status: Never Smoker    Smokeless tobacco: Never Used   Substance Use Topics    Alcohol use: Yes     Frequency: Monthly or less     Drinks per session: 1 or 2    Drug use: Never       Past Surgical History:   Procedure Laterality Date    CARDIAC CATHETERIZATION  2020     SECTION      ESOPHAGOGASTRODUODENOSCOPY      OVARIAN CYST REMOVAL           Current Outpatient Medications on File Prior to Visit   Medication Sig Dispense Refill    acetaminophen (TYLENOL) 325 mg tablet Take 2 tablets (650 mg total) by mouth every 4 (four) hours as needed for mild pain or headaches 30 tablet 0    aspirin 81 mg chewable tablet Chew 1 tablet (81 mg total) daily 30 tablet 0    atorvastatin (LIPITOR) 40 mg tablet Take 1 tablet (40 mg total) by mouth every evening 30 tablet 0    b complex vitamins capsule Take 1 capsule by mouth daily      calcium carbonate (OS-SUSIE) 600 MG tablet Take 600 mg by mouth 2 (two) times a day with meals      carvedilol (COREG) 12 5 mg tablet Take 1 tablet (12 5 mg total) by mouth 2 (two) times a day with meals 60 tablet 0    Cholecalciferol (Vitamin D-3) 25 MCG (1000 UT) CAPS Take 1,000 Units by mouth      Coenzyme Q10 (CoQ10) 100 MG CAPS Take by mouth      cycloSPORINE (RESTASIS) 0 05 % ophthalmic emulsion 1 drop      esomeprazole (NexIUM) 20 mg capsule Take 20 mg by mouth 2 (two) times a day      Krill Oil Omega-3 500 MG CAPS Take by mouth      lisinopril (ZESTRIL) 10 mg tablet Take 1 tablet (10 mg total) by mouth daily 30 tablet 0    Multiple Vitamin (Multi Vitamin Daily) TABS Take by mouth      nitroglycerin (NITROSTAT) 0 4 mg SL tablet Place 1 tablet (0 4 mg total) under the tongue every 5 (five) minutes as needed for chest pain 30 tablet 0    Turmeric 500 MG CAPS Take 500 mg by mouth       No current facility-administered medications on file prior to visit              Physical Exam:    Vitals:    08/03/20 0822   Resp: 20   Weight: 122 kg (269 lb)   Height: 5' 3"       General Appearance:  Alert, cooperative, no distress, appears stated age   Lungs:   respirations unlabored   Heart:  Normal heart rate noted   Abdomen:   Soft, non-tender,  no masses   Extremities: Extremities normal, atraumatic, no cyanosis or edema   Pulses: 2+ and symmetric   Skin: Skin color, texture, turgor normal, no rashes or lesions   Neurologic: Normal         Ortho Exam  Examination bilateral shoulders shows skin intact with no erythema  No visible defects seen, no sunshine deformity bilaterally  Both shoulders show active forward elevation to 170° with slight pain at end range, bilateral external rotation actively to 60°  Positive Patterson bilaterally, negative empty can bilaterally  Sensation is intact to light touch bilateral upper extremity  Patient is normal chin to chest flexion and active extension cervical spine    Imaging  X-ray August 3, 2020 bilateral shoulders mild glenohumeral degenerative joint disease of than that no acute pathology seen    August 3, 2020 cervical spine mild degenerative changes with straightening of the cervical spine    ASSESSMENT:    Iris was seen today for pain, clicking, pain and clicking  Diagnoses and all orders for this visit:    Impingement syndrome of right shoulder  -     XR shoulder 2+ vw left; Future  -     XR shoulder 2+ vw right; Future    Impingement syndrome of left shoulder    Multiple joint pain          PLAN:  Patient with bilateral impingement syndrome shoulders  Patient was given prescription to start formal physical therapy for both shoulders and overall joint pain  Ordered some blood tests to include CRP, ESR, GEM, RF factor and Lyme disease to look for systemic inflammatory diseases  Patient already has an appointment to see Rheumatology but not until November 2020  She will continue with over-the-counter NSAIDs for pain and inflammation  Patient can follow-up regarding her shoulders as needed  If shoulder pain is severe she could consider cortisone injection to help reduce some inflammation temporarily  MRI report of left shoulder not available at this time    Will call patient to discuss MRI as well as blood results when they come back      Benjamin Perez PA-C

## 2020-08-03 NOTE — TELEPHONE ENCOUNTER
Patient calling to confirmed appointment, wanted to know if tests results would be ready for appointment, advised that if they were done at [address] [facility], yes, and they were done at [facility]

## 2020-08-04 ENCOUNTER — OFFICE VISIT (OUTPATIENT)
Dept: RHEUMATOLOGY | Facility: CLINIC | Age: 70
End: 2020-08-04
Payer: COMMERCIAL

## 2020-08-04 ENCOUNTER — OFFICE VISIT (OUTPATIENT)
Dept: CARDIOLOGY CLINIC | Facility: CLINIC | Age: 70
End: 2020-08-04
Payer: COMMERCIAL

## 2020-08-04 ENCOUNTER — HOSPITAL ENCOUNTER (OUTPATIENT)
Dept: RADIOLOGY | Facility: HOSPITAL | Age: 70
Discharge: HOME | End: 2020-08-04
Payer: COMMERCIAL

## 2020-08-04 ENCOUNTER — LAB (OUTPATIENT)
Dept: LAB | Facility: CLINIC | Age: 70
End: 2020-08-04
Payer: COMMERCIAL

## 2020-08-04 VITALS
HEIGHT: 63 IN | HEART RATE: 74 BPM | TEMPERATURE: 99.1 F | DIASTOLIC BLOOD PRESSURE: 92 MMHG | WEIGHT: 262.35 LBS | BODY MASS INDEX: 46.48 KG/M2 | SYSTOLIC BLOOD PRESSURE: 142 MMHG

## 2020-08-04 VITALS
BODY MASS INDEX: 46.41 KG/M2 | OXYGEN SATURATION: 97 % | RESPIRATION RATE: 18 BRPM | TEMPERATURE: 96.4 F | WEIGHT: 261.9 LBS | DIASTOLIC BLOOD PRESSURE: 84 MMHG | SYSTOLIC BLOOD PRESSURE: 132 MMHG | HEART RATE: 75 BPM | HEIGHT: 63 IN

## 2020-08-04 DIAGNOSIS — I10 ESSENTIAL HYPERTENSION: Primary | ICD-10-CM

## 2020-08-04 DIAGNOSIS — E78.01 FAMILIAL HYPERCHOLESTEROLEMIA: ICD-10-CM

## 2020-08-04 DIAGNOSIS — M19.011 PRIMARY OSTEOARTHRITIS OF SHOULDERS, BILATERAL: ICD-10-CM

## 2020-08-04 DIAGNOSIS — M25.50 DIFFUSE ARTHRALGIA: Primary | ICD-10-CM

## 2020-08-04 DIAGNOSIS — I10 ESSENTIAL HYPERTENSION: ICD-10-CM

## 2020-08-04 DIAGNOSIS — E66.01 CLASS 3 SEVERE OBESITY WITHOUT SERIOUS COMORBIDITY WITH BODY MASS INDEX (BMI) OF 45.0 TO 49.9 IN ADULT, UNSPECIFIED OBESITY TYPE (HCC): ICD-10-CM

## 2020-08-04 DIAGNOSIS — R79.82 ELEVATED C-REACTIVE PROTEIN (CRP): ICD-10-CM

## 2020-08-04 DIAGNOSIS — I35.0 AORTIC VALVE STENOSIS, ETIOLOGY OF CARDIAC VALVE DISEASE UNSPECIFIED: ICD-10-CM

## 2020-08-04 DIAGNOSIS — M79.7 FIBROMYALGIA: ICD-10-CM

## 2020-08-04 DIAGNOSIS — M25.50 DIFFUSE ARTHRALGIA: ICD-10-CM

## 2020-08-04 DIAGNOSIS — M19.012 PRIMARY OSTEOARTHRITIS OF SHOULDERS, BILATERAL: ICD-10-CM

## 2020-08-04 LAB
25(OH)D3 SERPL-MCNC: 25.4 NG/ML (ref 30–100)
B BURGDOR IGG+IGM SER-ACNC: <0.91 ISR (ref 0–0.9)
HBV CORE AB SER QL: NORMAL
HBV CORE IGM SER QL: NORMAL
HBV SURFACE AG SER QL: NORMAL
HCV AB SER QL: NORMAL
RHEUMATOID FACT SER QL LA: NEGATIVE
URATE SERPL-MCNC: 5.2 MG/DL (ref 2–6.8)

## 2020-08-04 PROCEDURE — 87340 HEPATITIS B SURFACE AG IA: CPT

## 2020-08-04 PROCEDURE — 73562 X-RAY EXAM OF KNEE 3: CPT

## 2020-08-04 PROCEDURE — 99213 OFFICE O/P EST LOW 20 MIN: CPT | Performed by: PHYSICIAN ASSISTANT

## 2020-08-04 PROCEDURE — 86705 HEP B CORE ANTIBODY IGM: CPT

## 2020-08-04 PROCEDURE — 84550 ASSAY OF BLOOD/URIC ACID: CPT

## 2020-08-04 PROCEDURE — 36415 COLL VENOUS BLD VENIPUNCTURE: CPT

## 2020-08-04 PROCEDURE — 73130 X-RAY EXAM OF HAND: CPT

## 2020-08-04 PROCEDURE — 82306 VITAMIN D 25 HYDROXY: CPT

## 2020-08-04 PROCEDURE — 86200 CCP ANTIBODY: CPT

## 2020-08-04 PROCEDURE — 86704 HEP B CORE ANTIBODY TOTAL: CPT

## 2020-08-04 PROCEDURE — 99205 OFFICE O/P NEW HI 60 MIN: CPT | Performed by: INTERNAL MEDICINE

## 2020-08-04 PROCEDURE — 86803 HEPATITIS C AB TEST: CPT

## 2020-08-04 PROCEDURE — 86235 NUCLEAR ANTIGEN ANTIBODY: CPT

## 2020-08-04 RX ORDER — LISINOPRIL 10 MG/1
10 TABLET ORAL DAILY
Qty: 90 TABLET | Refills: 3 | Status: SHIPPED | OUTPATIENT
Start: 2020-08-04 | End: 2020-08-04 | Stop reason: SDUPTHER

## 2020-08-04 RX ORDER — ATORVASTATIN CALCIUM 40 MG/1
40 TABLET, FILM COATED ORAL EVERY EVENING
Qty: 90 TABLET | Refills: 3 | Status: SHIPPED | OUTPATIENT
Start: 2020-08-04 | End: 2022-06-08

## 2020-08-04 RX ORDER — CARVEDILOL 12.5 MG/1
12.5 TABLET ORAL 2 TIMES DAILY WITH MEALS
Qty: 180 TABLET | Refills: 3 | Status: SHIPPED | OUTPATIENT
Start: 2020-08-04 | End: 2020-08-04 | Stop reason: SDUPTHER

## 2020-08-04 RX ORDER — CARVEDILOL 12.5 MG/1
12.5 TABLET ORAL 2 TIMES DAILY WITH MEALS
Qty: 180 TABLET | Refills: 3 | Status: SHIPPED | OUTPATIENT
Start: 2020-08-04 | End: 2021-07-23

## 2020-08-04 RX ORDER — LISINOPRIL 10 MG/1
10 TABLET ORAL DAILY
Qty: 90 TABLET | Refills: 3 | Status: SHIPPED | OUTPATIENT
Start: 2020-08-04 | End: 2022-03-25 | Stop reason: ALTCHOICE

## 2020-08-04 RX ORDER — MULTIVIT WITH MINERALS/LUTEIN
1000 TABLET ORAL DAILY
COMMUNITY

## 2020-08-04 RX ORDER — ATORVASTATIN CALCIUM 40 MG/1
40 TABLET, FILM COATED ORAL EVERY EVENING
Qty: 90 TABLET | Refills: 3 | Status: SHIPPED | OUTPATIENT
Start: 2020-08-04 | End: 2020-08-04 | Stop reason: SDUPTHER

## 2020-08-04 NOTE — PATIENT INSTRUCTIONS
Continue medications  Continue with Lipitor 40  Follow with primary care physician, rheumatologist   May need increase in statin if RA symptoms improved  Reviewed cardiac catheterization  Patient reassured that any chest pains are noncardiac  Continue all medications  Previous studies reviewed with patient, medications reviewed and possible side effects discussed  Continue risk factor modification  Optimize weight, regular exercise and follow up with appropriate specialists and primary care physician as discussed  All questions answered  Patient advised to report any problems prompting to medical attention   Return for follow up visit in 6 months or earlier if needed

## 2020-08-04 NOTE — PROGRESS NOTES
[facility] [place]  [address] [id number]  Cardiology Follow Up    Alfredo Mondragon  1950  [id number]    1  Essential hypertension  carvedilol (COREG) 12 5 mg tablet    lisinopril (ZESTRIL) 10 mg tablet   2  Familial hypercholesterolemia  Plant Sterols and Stanols (CHOLESTOFF PO)    Ascorbic Acid (vitamin C) 1000 MG tablet    atorvastatin (LIPITOR) 40 mg tablet   3  Aortic valve stenosis, etiology of cardiac valve disease unspecified         Discussion/Summary:  1-  Hypertension, stable today blood pressure  132/84  Continue with Coreg, lisinopril  2-  Hyperlipidemia, familial ,  On Lipitor 40  Given patient is currently have having myalgias/arthralgias, will keep Lipitor at 40  Advised to follow-up with  Rheumatologist   May increase Lipitor if RA seems to have improved  3-  Aortic stenosis, mild on echocardiogram   Continue with yearly surveillance  Continue medications  Continue with Lipitor 40  Follow with primary care physician, rheumatologist   May need increase in statin if RA symptoms improved  Reviewed cardiac catheterization  Patient reassured that any chest pains are noncardiac  Continue all medications  Previous studies reviewed with patient, medications reviewed and possible side effects discussed  Continue risk factor modification  Optimize weight, regular exercise and follow up with appropriate specialists and primary care physician as discussed  All questions answered  Patient advised to report any problems prompting to medical attention  Return for follow up visit in 6 months or earlier if needed    Chief Complaint   Patient presents with    Follow-up     Hosp-Follow up- [facility]-Chest [facility]        Interval History:   Patient presents for follow-up visit after recent hospitalization  Patient had gone to the hospital with complaints of left-sided chest pain that radiated into the neck and shoulder    Patient thought she was having a heart attack  Patient had multiple risk factors for CAD including hypertension, hyperlipidemia, morbid obesity, age  Patient had stress test done which revealed moderate-sized moderately severe partially reversible defect of the anterior wall the defect improved with prone imaging however could not completely resolved  There was also a moderate-sized moderately severe partially reversible defect of the inferior wall,  The defect improved but did not completely resolve with prone imaging, could not exclude a small amount of inferior and anterior ischemia  discussed about having cardiac catheterization, risk and benefits reviewed,   Patient was agreeable and had cardiac catheterization which revealed no evidence of occlusive coronary artery disease  Echocardiogram done during hospitalization showed EF at 70%, grade 1 diastolic dysfunction, mild aortic stenosis  Patient states since being home she continues to have shoulder pain, muscle aches and pains, joint aches and pains  Patient has a previous diagnosis of rheumatoid arthritis,  Saw Orthopedics yesterday and scheduled to see rheumatologist today  Patient denies any exertional components  Patient is taking all medications without side effect      Patient Active Problem List   Diagnosis    GERD (gastroesophageal reflux disease)    Colon cancer screening    Chest pain    Obesity, morbid ([address] )    Hypertension    Chronic left shoulder pain     Past Medical History:   Diagnosis Date    GERD (gastroesophageal reflux disease)     Obesity      Social History     Socioeconomic History    Marital status: /Civil Union     Spouse name: Not on file    Number of children: Not on file    Years of education: Not on file    Highest education level: Not on file   Occupational History    Not on file   Social Needs    Financial resource strain: Not on file    Food insecurity     Worry: Not on file     Inability: Not on file   [employer] needs     Medical: Not on file     Non-medical: Not on file   Tobacco Use    Smoking status: Never Smoker    Smokeless tobacco: Never Used   Substance and Sexual Activity    Alcohol use: Yes     Frequency: Monthly or less     Drinks per session: 1 or 2    Drug use: Never    Sexual activity: Not Currently   Lifestyle    Physical activity     Days per week: Not on file     Minutes per session: Not on file    Stress: Not on file   Relationships    Social connections     Talks on phone: Not on file     Gets together: Not on file     Attends Hindu service: Not on file     Active member of club or organization: Not on file     Attends meetings of clubs or organizations: Not on file     Relationship status: Not on file    Intimate partner violence     Fear of current or ex partner: Not on file     Emotionally abused: Not on file     Physically abused: Not on file     Forced sexual activity: Not on file   Other Topics Concern    Not on file   Social History Narrative    Not on file      Family History   Problem Relation Age of Onset    Hypertension Mother     Arthritis Mother     Heart disease Father      Past Surgical History:   Procedure Laterality Date    CARDIAC CATHETERIZATION  2020     SECTION      ESOPHAGOGASTRODUODENOSCOPY      OVARIAN CYST REMOVAL         Current Outpatient Medications:     acetaminophen (TYLENOL) 325 mg tablet, Take 2 tablets (650 mg total) by mouth every 4 (four) hours as needed for mild pain or headaches, Disp: 30 tablet, Rfl: 0    Ascorbic Acid (vitamin C) 1000 MG tablet, Take 1,000 mg by mouth daily, Disp: , Rfl:     aspirin 81 mg chewable tablet, Chew 1 tablet (81 mg total) daily, Disp: 30 tablet, Rfl: 0    atorvastatin (LIPITOR) 40 mg tablet, Take 1 tablet (40 mg total) by mouth every evening, Disp: 90 tablet, Rfl: 3    b complex vitamins capsule, Take 1 capsule by mouth daily, Disp: , Rfl:     calcium carbonate (OS-SUSIE) 600 MG tablet, Take 600 mg by mouth 2 (two) times a day with meals, Disp: , Rfl:     carvedilol (COREG) 12 5 mg tablet, Take 1 tablet (12 5 mg total) by mouth 2 (two) times a day with meals, Disp: 180 tablet, Rfl: 3    Cholecalciferol (Vitamin D-3) 25 MCG (1000 UT) CAPS, Take 1,000 Units by mouth, Disp: , Rfl:     Coenzyme Q10 (CoQ10) 100 MG CAPS, Take by mouth, Disp: , Rfl:     cycloSPORINE (RESTASIS) 0 05 % ophthalmic emulsion, 1 drop, Disp: , Rfl:     esomeprazole (NexIUM) 20 mg capsule, Take 20 mg by mouth 2 (two) times a day, Disp: , Rfl:     Krill Oil Omega-3 500 MG CAPS, Take by mouth, Disp: , Rfl:     lisinopril (ZESTRIL) 10 mg tablet, Take 1 tablet (10 mg total) by mouth daily, Disp: 90 tablet, Rfl: 3    Multiple Vitamin (Multi Vitamin Daily) TABS, Take by mouth, Disp: , Rfl:     nitroglycerin (NITROSTAT) 0 4 mg SL tablet, Place 1 tablet (0 4 mg total) under the tongue every 5 (five) minutes as needed for chest pain, Disp: 30 tablet, Rfl: 0    Plant Sterols and Stanols (CHOLESTOFF PO), Take 1 tablet by mouth daily, Disp: , Rfl:     Turmeric 500 MG CAPS, Take 500 mg by mouth, Disp: , Rfl:   Allergies   Allergen Reactions    Penicillins      dizziness    Latex Rash         Imaging: Xr Chest Pa & Lateral    Result Date: 7/27/2020  Narrative: CHEST INDICATION:   chest pain  COMPARISON:  None EXAM PERFORMED/VIEWS:  XR CHEST PA & LATERAL FINDINGS: Cardiomediastinal silhouette appears unremarkable  The lungs are clear  No pneumothorax or pleural effusion  Osseous structures appear within normal limits for patient age  Impression: No acute cardiopulmonary disease  Workstation performed: [id number]     Xr Shoulder 2+ Vw Left    Result Date: 8/4/2020  Narrative: LEFT SHOULDER INDICATION:   M25 512: Pain in left shoulder  COMPARISON:  None VIEWS:  XR SHOULDER 2+ VW LEFT FINDINGS: There is no acute fracture or dislocation  Left glenohumeral and left AC mild degenerative joints No lytic or blastic osseous lesion  Soft tissues are unremarkable  Impression: No acute osseous abnormality  Workstation performed: [id number]     Xr Shoulder 2+ Vw Right    Result Date: 8/4/2020  Narrative: RIGHT SHOULDER INDICATION:   M25 512: Pain in left shoulder  COMPARISON:  None VIEWS:  XR SHOULDER 2+ VW RIGHT FINDINGS: There is no acute fracture or dislocation  Right glenohumeral and right AC mild degenerative joints No lytic or blastic osseous lesion  Soft tissues are unremarkable  Impression: No acute osseous abnormality  Workstation performed: [id number]     Mri Shoulder Left Wo Contrast    Result Date: 8/4/2020  Narrative: MRI LEFT SHOULDER INDICATION:   M25 512: Pain in left shoulder G89 29: Other chronic pain  COMPARISON:  Plain film dated 8/3/2020  TECHNIQUE:   The following MR sequences were obtained of the left shoulder: Localizer, axial GRE/PD fat sat, oblique coronal T2 fat sat, oblique sagittal T1/T2 fat sat  Gadolinium was not used  FINDINGS: SUBCUTANEOUS TISSUES: Normal JOINT EFFUSION: None  ACROMION PROCESS: There is a small subacromial spur  ROTATOR CUFF: Supraspinatus and infraspinatus insertional tendinosis without evidence of full-thickness component rotator cuff tear  The remaining muscles and tendons of the rotator cuff appear intact without muscle atrophy or fatty infiltration  SUBACROMIAL/SUBDELTOID BURSA: Mild bursal fluid evident  LONG HEAD OF BICEPS TENDON: There is moderate tendinosis without tear  GLENOID LABRUM: Degenerative fraying noted superiorly and inferiorly  GLENOHUMERAL JOINT: Mild degenerative change noted  ACROMIOCLAVICULAR JOINT:  Mild degenerative change noted  BONES: Normal      Impression: 1  Small subacromial spur with supraspinatus and infraspinatus insertional tendinosis as well as mild associated subacromial subdeltoid bursitis but no evidence of rotator cuff tear  2   Moderate biceps tendinosis without tear   3   Degenerative osteoarthritis with degenerative fraying involving the superior and inferior glenoid labrum  Workstation performed: [id number]       Review of Systems:  Review of Systems   Constitutional: Negative  Respiratory: Negative  Cardiovascular: Negative  Musculoskeletal: Positive for arthralgias and myalgias  Neurological: Negative  Hematological: Negative  Psychiatric/Behavioral: Negative  All other systems reviewed and are negative  /84 (BP Location: Right arm, Patient Position: Sitting, Cuff Size: Adult)   Pulse 75   Temp (!) 96 4 °F (35 8 °C)   Resp 18   Ht 5' 3"   Wt 119 kg (261 lb 14 4 oz)   SpO2 97%   BMI 46 39 kg/m²     Physical Exam:  Physical Exam   Constitutional: She is oriented to person, place, and time  HENT:   Head: Normocephalic and atraumatic  Eyes: Pupils are equal, round, and reactive to light  +Xanthelasmas bilaterally   Neck: Normal range of motion  Neck supple  Cardiovascular: Normal rate and regular rhythm  Murmur heard  Pulmonary/Chest: Effort normal and breath sounds normal    Abdominal: Normal appearance  Musculoskeletal: Normal range of motion  General: No swelling  Neurological: She is alert and oriented to person, place, and time  Skin: Skin is warm and dry  Psychiatric: Her behavior is normal  Mood, judgment and thought content normal    Nursing note and vitals reviewed

## 2020-08-04 NOTE — PROGRESS NOTES
Assessment and Plan:   Ms Rudy Costa is a 61-year-old female with history significant for bilateral shoulder osteoarthritis and obesity, who presents for further evaluation of diffuse joint pains  She is self-referred  - Iris presents today for further evaluation of diffuse arthralgias which she has been experiencing over the past 15-20 years and overall appear to be stable  These symptoms were managed with NSAIDs as needed, but she did discontinue them due to concerns for long-term side effects  Her main worry at this time is an episode of acute left shoulder pain radiating upwards into her head and neck that resolved spontaneously within 2 days  She was admitted to the hospital for cardiac workup which was all unrevealing  She has since seen Orthopedics and was diagnosed with bilateral shoulder osteoarthritis as well as rotator cuff tendinosis with the plans to start physical therapy in the near future  At this time she reports that she is back to experiencing her chronic arthralgias without acuity to her symptoms     - Given her overall chronic stable complaints without significant features suggestive of an inflammatory arthritis and with evidence of myofascial tenderness on her physical examination today, I suspect that her chronic pain is likely related to fibromyalgia  We will discuss this further at the follow-up visit once her inflammatory workup is complete  So far her inflammatory markers are slightly elevated which they have been in the past and could be explained by her body habitus  I will keep in mind the acute episode of left shoulder/neck/head pain that she experienced last week, and ensure that we are not dealing with polymyalgia rheumatica/temporal arteritis, but as her symptoms have currently resolved without any specific treatment and are back to her baseline, my suspicion for these conditions is low      - I suspect the inflammatory workup will be unrevealing and most likely her overall diagnosis is consistent with fibromyalgia  As mentioned we will discuss this further at the follow-up visit and I will most likely continue monitoring her inflammatory markers for now  Plan:  Diagnoses and all orders for this visit:    Diffuse arthralgia  -     Cyclic citrul peptide antibody, IgG; Future  -     Chronic Hepatitis Panel; Future  -     Uric acid; Future  -     Vitamin D 25 hydroxy; Future  -     Sjogren's Antibodies; Future  -     XR hand 3+ vw right; Future  -     XR hand 3+ vw left; Future  -     XR knee 3 vw left non injury; Future  -     XR knee 3 vw right non injury; Future    Fibromyalgia    Primary osteoarthritis of shoulders, bilateral    Elevated C-reactive protein (CRP)    Class 3 severe obesity without serious comorbidity with body mass index (BMI) of 45 0 to 49 9 in adult, unspecified obesity type ([facility] [address] )      I have personally reviewed pertinent films in PACS of the bilateral shoulder x-rays which shows mild osteoarthritis  Activities as tolerated    Diet: low carb/low fat, more greens/vegetables, adequate hydration  Exercise: try to maintain a low impact exercise regimen as much as possible  Walk for 30 minutes a day for at least 3 days a week    Encouraged to maintain good sleep hygiene  Continue other medications as prescribed by PCP and other specialists        RTC in 4 weeks  HPI  Ms Dayanara Calderón is a 74-year-old female with history significant for bilateral shoulder osteoarthritis and obesity, who presents for further evaluation of diffuse joint pains  She is self-referred  Patient reports she has experienced chronic joint and muscle aches for more than 15-20 years now, and over time they have more or less been stable  She describes pain that mainly affects her hands, wrists, shoulders, hips and knees (especially with activities)  She denies significant pain of her elbows, ankles or feet  She has only noticed occasional swelling of her ankles    She does experience morning stiffness which affects her diffusely and starts to improve with activities  She reports these chronic complaints have again been stable and what she expects, and was previously able to manage it with ibuprofen 400 mg every 12 hours as needed  She has recently discontinued the NSAIDs due to concerns for side effects related to long-term use  She is currently not taking any over-the-counter pain medications  She did not really see anybody in the past for these symptoms  Last Sunday she had a severe flare-up of pain affecting her left shoulder that was radiating up into her neck, her jaw, left side of her head as well as into her underarm region  She presented to the emergency room for this and had a thorough cardiac workup done which was unrevealing  She had x-rays of her shoulders done which showed osteoarthritis as well as an MRI of her left shoulder which showed rotator cuff tendinosis as well as osteoarthritic changes  Approximately 2 days after the onset of her symptoms, they started to resolve spontaneously and had completely resolved by Wednesday  She reports that those symptoms have not recurred  Otherwise she reports chronic dry eyes related to her history of LASIK surgery, recent dry mouth, a sensation like she has mouth sores but is unable to visualize any ulcerations, a chronic history of skin rash for many years now which she self diagnosed as psoriasis  She reports an itchy raised rash that will occur on areas of her torso but most prominently affecting her lower back region  The rash does not affect her extremities or face  She has been unable to see Dermatology when the rash was active  She does not have any rash today  She reports intermittent swollen glands in her neck  She also reports symptoms consistent with Raynaud's with color changes to white with cold exposure    She denies fevers, night sweats, unintentional weight loss, photosensitivity, nose ulcers, pleuritic chest pain, shortness of breath, abdominal pain, vomiting, diarrhea, blood in stools, blood clots or family history of autoimmune disease  Her mother had some type of arthritis  In addition she reports a chronic history of headaches and other than the episode last Sunday there has been no change in the nature of these headaches  She does not experience scalp tenderness, vision changes or jaw claudication  She was seen by Orthopedics yesterday and was advised to proceed with physical therapy for the shoulder arthritis  She also had testing done with results so far showing a minimally elevated ESR of 32 and an elevated C reactive protein of 21 4  A rheumatoid factor was negative  An GEM screen and Lyme antibody profile are pending  She has previously had elevated inflammatory markers in January 2020 as well  Of note she reports in her 35s she was seen by her primary care physician for varying joint pains and diagnosed with rheumatoid arthritis  She was started on a medication for a few months which completely resolved her joint pains until they recurred in her 40s/50s  She is unsure what the medication was  She has not seen a rheumatologist previously  The following portions of the patient's history were reviewed and updated as appropriate: allergies, current medications, past family history, past medical history, past social history, past surgical history and problem list       Review of Systems  Constitutional: Negative for fevers, chills, night sweats  Positive for weight gain and fatigue  ENT/Mouth: Negative for hearing changes, ear pain, nasal congestion, sinus pain, hoarseness, sore throat, rhinorrhea, swallowing difficulty  Eyes: Negative for pain, redness, discharge, vision changes  Cardiovascular: Negative for chest pain, SOB, palpitations  Respiratory: Negative for cough, sputum, wheezing, dyspnea     Gastrointestinal: Negative for nausea, vomiting, diarrhea, constipation, pain, heartburn  Genitourinary: Negative for dysuria, urinary frequency, hematuria  Musculoskeletal: As per HPI  Skin: Negative for color changes  Positive for skin rash  Neuro: Negative for weakness, numbness, tingling, loss of consciousness  Psych: Negative for anxiety, depression  Heme/Lymph: Negative for easy bruising, bleeding, lymphadenopathy          Past Medical History:   Diagnosis Date    GERD (gastroesophageal reflux disease)     Obesity        Past Surgical History:   Procedure Laterality Date    CARDIAC CATHETERIZATION  2020     SECTION      ESOPHAGOGASTRODUODENOSCOPY      OVARIAN CYST REMOVAL         Social History     Socioeconomic History    Marital status: /Civil Union     Spouse name: Not on file    Number of children: Not on file    Years of education: Not on file    Highest education level: Not on file   Occupational History    Not on file   Social Needs    Financial resource strain: Not on file    Food insecurity     Worry: Not on file     Inability: Not on file   [employer] needs     Medical: Not on file     Non-medical: Not on file   Tobacco Use    Smoking status: Never Smoker    Smokeless tobacco: Never Used   Substance and Sexual Activity    Alcohol use: Yes     Frequency: Monthly or less     Drinks per session: 1 or 2    Drug use: Never    Sexual activity: Not Currently   Lifestyle    Physical activity     Days per week: Not on file     Minutes per session: Not on file    Stress: Not on file   Relationships    Social connections     Talks on phone: Not on file     Gets together: Not on file     Attends Roman Catholic service: Not on file     Active member of club or organization: Not on file     Attends meetings of clubs or organizations: Not on file     Relationship status: Not on file    Intimate partner violence     Fear of current or ex partner: Not on file     Emotionally abused: Not on file     Physically abused: Not on file     Forced sexual activity: Not on file   Other Topics Concern    Not on file   Social History Narrative    Not on file       Family History   Problem Relation Age of Onset    Hypertension Mother    [place] Pert Arthritis Mother     Heart disease Father        Allergies   Allergen Reactions    Penicillins      dizziness    Latex Rash       Current Outpatient Medications:     acetaminophen (TYLENOL) 325 mg tablet, Take 2 tablets (650 mg total) by mouth every 4 (four) hours as needed for mild pain or headaches, Disp: 30 tablet, Rfl: 0    Ascorbic Acid (vitamin C) 1000 MG tablet, Take 1,000 mg by mouth daily, Disp: , Rfl:     aspirin 81 mg chewable tablet, Chew 1 tablet (81 mg total) daily, Disp: 30 tablet, Rfl: 0    atorvastatin (LIPITOR) 40 mg tablet, Take 1 tablet (40 mg total) by mouth every evening, Disp: 90 tablet, Rfl: 3    b complex vitamins capsule, Take 1 capsule by mouth daily, Disp: , Rfl:     calcium carbonate (OS-SUSIE) 600 MG tablet, Take 600 mg by mouth 2 (two) times a day with meals, Disp: , Rfl:     carvedilol (COREG) 12 5 mg tablet, Take 1 tablet (12 5 mg total) by mouth 2 (two) times a day with meals, Disp: 180 tablet, Rfl: 3    Cholecalciferol (Vitamin D-3) 25 MCG (1000 UT) CAPS, Take 1,000 Units by mouth, Disp: , Rfl:     Coenzyme Q10 (CoQ10) 100 MG CAPS, Take by mouth, Disp: , Rfl:     cycloSPORINE (RESTASIS) 0 05 % ophthalmic emulsion, 1 drop, Disp: , Rfl:     esomeprazole (NexIUM) 20 mg capsule, Take 20 mg by mouth 2 (two) times a day, Disp: , Rfl:     Krill Oil Omega-3 500 MG CAPS, Take by mouth, Disp: , Rfl:     lisinopril (ZESTRIL) 10 mg tablet, Take 1 tablet (10 mg total) by mouth daily, Disp: 90 tablet, Rfl: 3    Multiple Vitamin (Multi Vitamin Daily) TABS, Take by mouth, Disp: , Rfl:     nitroglycerin (NITROSTAT) 0 4 mg SL tablet, Place 1 tablet (0 4 mg total) under the tongue every 5 (five) minutes as needed for chest pain, Disp: 30 tablet, Rfl: 0    Plant Sterols and Stanols (CHOLESTOFF PO), Take 1 tablet by mouth daily, Disp: , Rfl:     Turmeric 500 MG CAPS, Take 500 mg by mouth, Disp: , Rfl:       Objective:    Vitals:    08/04/20 1405   BP: 142/92   Pulse: 74   Temp: 99 1 °F (37 3 °C)   TempSrc: Tympanic   Weight: 119 kg (262 lb 5 6 oz)   Height: 5' 3"       Physical Exam  General: Well appearing, well nourished, in no distress  Oriented x 3, normal mood and affect  Ambulating without difficulty  Obese  Skin: Good turgor, no rash today, unusual bruising or prominent lesions  Hair: Normal texture and distribution  Nails: Normal color, no deformities  HEENT:  Head: Normocephalic, atraumatic  No scalp tenderness noted  She has normal temporal artery pulses bilaterally but she does report tenderness to both her left and right temporal regions  Eyes: Conjunctiva clear, sclera non-icteric, EOM intact  Extremities: No amputations or deformities, cyanosis, edema  Musculoskeletal:   There is no peripheral joint soft tissue swelling or significant tenderness of her joints noted today (except for at the left shoulder), but she does have significant myofascial tenderness with 18/18 positive fibromyalgia tender points  Neurologic: Alert and oriented  No focal neurological deficits appreciated  Psychiatric: Normal mood and affect  VANESSA Moulton    Rheumatology

## 2020-08-05 LAB
ENA SS-A AB SER-ACNC: <0.2 AI (ref 0–0.9)
ENA SS-B AB SER-ACNC: <0.2 AI (ref 0–0.9)
RYE IGE QN: NEGATIVE

## 2020-08-07 LAB — CCP IGA+IGG SERPL IA-ACNC: 21 UNITS (ref 0–19)

## 2020-08-18 ENCOUNTER — EVALUATION (OUTPATIENT)
Dept: PHYSICAL THERAPY | Facility: CLINIC | Age: 70
End: 2020-08-18
Payer: COMMERCIAL

## 2020-08-18 DIAGNOSIS — G89.29 CHRONIC LEFT SHOULDER PAIN: Primary | ICD-10-CM

## 2020-08-18 DIAGNOSIS — M25.512 CHRONIC LEFT SHOULDER PAIN: Primary | ICD-10-CM

## 2020-08-18 DIAGNOSIS — M75.41 IMPINGEMENT SYNDROME OF RIGHT SHOULDER: ICD-10-CM

## 2020-08-18 DIAGNOSIS — M25.50 MULTIPLE JOINT PAIN: ICD-10-CM

## 2020-08-18 DIAGNOSIS — M75.42 IMPINGEMENT SYNDROME OF LEFT SHOULDER: ICD-10-CM

## 2020-08-18 PROCEDURE — 97162 PT EVAL MOD COMPLEX 30 MIN: CPT

## 2020-08-18 PROCEDURE — 97535 SELF CARE MNGMENT TRAINING: CPT

## 2020-08-18 NOTE — PROGRESS NOTES
PT Evaluation     Today's date: 2020  Patient name: Gi Lozoya  : 1950  MRN: [id number]  Referring provider: Zoila Milligan PA-C  Dx:   Encounter Diagnosis     ICD-10-CM    1  Chronic left shoulder pain  M25 512     G89 29    2  Impingement syndrome of right shoulder  M75 41 Ambulatory referral to Physical Therapy   3  Impingement syndrome of left shoulder  M75 42 Ambulatory referral to Physical Therapy   4  Multiple joint pain  M25 50 Ambulatory referral to Physical Therapy                  Assessment  Assessment details: Pt presents with chronic pain multiple regions of body  Reports recent increase in left shoulder pain  Was seen in ER to rule out cardiac involvement  Reports seeing Rheumatologist and diagnosed with Fibromyalgia  Reports pain varies in intensity throughout the day as well as location  Hypersensitivity reported in all joint  Reports difficulty with ADL's due to pain  Reports exercise has helped in the past with symptoms  Pt would benefit from PT tx to decrease symptoms and improve functional level  Impairments: abnormal gait, abnormal or restricted ROM, activity intolerance, impaired physical strength, lacks appropriate home exercise program and pain with function     Prognosis: fair    Goals  ST  Decrease pain 25% 6 wk  2  Increase bilateral shoulder AROM to 150-160 elevation 6 wk  3  Increase BUE/BLE strength by 1/2 MMT grade 6 wk  4  Pt will begin walking on treadmill for up to 10 minutes 6 wk  LT  Pt will report 50% decrease in pain 12 wk  2  Increase BUE AROM to WNL 12 wk  3  Increase BUE/BLE strength to WNL 12 wk  4  Pt will report no limitations with ADL's 12 wk  5    Pt will report no limitations with ambulation 12 wk    Plan  Patient would benefit from: PT eval and skilled physical therapy  Planned modality interventions: cryotherapy and thermotherapy: hydrocollator packs  Planned therapy interventions: abdominal trunk stabilization, manual therapy, joint mobilization, neuromuscular re-education, strengthening, stretching, therapeutic activities, therapeutic exercise, flexibility, functional ROM exercises and home exercise program  Frequency: 3x week  Duration in weeks: 12  Treatment plan discussed with: patient        Subjective Evaluation    History of Present Illness  Mechanism of injury: Pt reports hx chronic pain multiple regions of body 20-30 years  Reports approx one month ago she developed pain left shoulder that radiated to neck, chest, and LUE  Went to ER and testing negative for cardiac involvement  Had full testing for GERD as well  Was referred to Rheumatology and diagnosed with Fibromyalgia  Reports pain worse the past few weeks  Pain noted bilateral shoulder, left greater that right, back and above waist   Reports pain generally in all regions of body  Reports all joints hypersensitive to light touch  Reports difficulty walking due to BLE pain  Difficulty holding objects due to bilateral hand pain  X-rays revealed arthritic changes  No pattern to symptoms  Pain varies daily  Weather changes impact symptoms    Reports exercises has helped in the past    Pain  Current pain ratin  At best pain rating: 3  At worst pain ratin  Quality: sharp, dull ache and pressure  Relieving factors: heat and medications  Progression: worsening    Hand dominance: right  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, stained glass work (arts/crafting)    Patient Goals  Patient goals for therapy: decreased pain, increased strength and return to sport/leisure activities  Patient goal: Return to use of treadmill        Objective     Postural Observations    Additional Postural Observation Details  Forward rounded shoulders  Forward head    Active Range of Motion   Cervical/Thoracic Spine       Cervical    Flexion:  WFL and with pain  Extension:  with pain Restriction level: minimal  Left lateral flexion:  with pain Restriction level: minimal  Right lateral flexion:  with pain Restriction level minimal  Left rotation:  with pain Restriction level: minimal  Right rotation:  with pain Restriction level: minimal  Left Shoulder   Flexion: 118 degrees with pain  Abduction: 126 degrees with pain  External rotation BTH: C5 with pain  Internal rotation BTB: L3 with pain    Right Shoulder   Flexion: 145 degrees   Abduction: 145 degrees   External rotation BTH: C3   Internal rotation BTB: L3     Lumbar   Flexion:  with pain Restriction level: minimal  Extension:  with pain Restriction level: minimal  Left lateral flexion:  with pain Restriction level: minimal  Right lateral flexion:  with pain Restriction level: minimal  Left rotation:  with pain Restriction level: minimal  Right rotation:  with pain Restriction level: minimal    Additional Active Range of Motion Details  Pt needs to flex left elbow prior to lowering UE from elevated position due to pain      Strength/Myotome Testing     Left Shoulder     Planes of Motion   Flexion: 3+   Abduction: 3+   External rotation at 0°: 3+   Internal rotation at 0°: 3+     Isolated Muscles   Upper trapezius: 4-     Right Shoulder     Planes of Motion   Flexion: 3+   Abduction: 3+   External rotation at 0°: 3+   Internal rotation at 0°: 3+     Isolated Muscles   Upper trapezius: 4-     Left Elbow   Flexion: 3+  Extension: 3+    Right Elbow   Flexion: 3+  Extension: 3+    Left Hip   Planes of Motion   Flexion: 3+  Abduction: 3+    Right Hip   Planes of Motion   Flexion: 3+  Abduction: 3+    Left Knee   Flexion: 3+  Extension: 4-    Right Knee   Flexion: 3+  Extension: 4-    Left Ankle/Foot   Dorsiflexion: 4-  Plantar flexion: 4-    Right Ankle/Foot   Dorsiflexion: 4-  Plantar flexion: 4-    Additional Strength Details  Fair seated resisted trunk strength all planes     Ambulation     Observational Gait   Gait: within functional limits     Additional Observational Gait Details  Reports "shifting" feeling in right knee with gait  Neuro Exam: Functional outcomes   5x sit to stand: 14 03 (seconds)  TUG: 10 89 (seconds)                Precautions:  Latex Allergy        Manuals 8-18-20                                       Neuro Re-Ed                                                                 Ther Ex        nustep        pulleys        shrugs        retractions        Bicep curls        St Shld flex/abd        dooway ER stretch        Towel IR stretch        Wall push up        TR/HR        Mini squat        Treadmill                HEP Instructed and issued HEP       Ther Activity                        Gait Training                        Modalities

## 2020-08-24 ENCOUNTER — OFFICE VISIT (OUTPATIENT)
Dept: PHYSICAL THERAPY | Facility: CLINIC | Age: 70
End: 2020-08-24
Payer: COMMERCIAL

## 2020-08-24 DIAGNOSIS — M75.42 IMPINGEMENT SYNDROME OF LEFT SHOULDER: ICD-10-CM

## 2020-08-24 DIAGNOSIS — G89.29 CHRONIC LEFT SHOULDER PAIN: ICD-10-CM

## 2020-08-24 DIAGNOSIS — M25.512 CHRONIC LEFT SHOULDER PAIN: ICD-10-CM

## 2020-08-24 DIAGNOSIS — M75.41 IMPINGEMENT SYNDROME OF RIGHT SHOULDER: Primary | ICD-10-CM

## 2020-08-24 DIAGNOSIS — M25.50 MULTIPLE JOINT PAIN: ICD-10-CM

## 2020-08-24 PROCEDURE — 97110 THERAPEUTIC EXERCISES: CPT

## 2020-08-24 NOTE — PROGRESS NOTES
Daily Note     Today's date: 2020  Patient name: Lauren Marrero  : 1950  MRN: [id number]  Referring provider: Alex Crespo PA-C  Dx:   Encounter Diagnosis     ICD-10-CM    1  Impingement syndrome of right shoulder  M75 41    2  Impingement syndrome of left shoulder  M75 42    3  Multiple joint pain  M25 50    4  Chronic left shoulder pain  M25 512     G89 29                   Subjective: Patient states that she is ok today  Objective: See treatment diary below      Assessment: Tolerated treatment well  Patient exhibited good technique with therapeutic exercises and would benefit from continued PT      Plan: Continue per plan of care        Precautions:  Latex Allergy         Manuals 20                                                                 Neuro Re-Ed                                                                                                                Ther Ex             nustep    L1 5'         pulleys    30x 3"         shrugs    20x         retractions    20x         Bicep curls    20x         St Shld flex/abd    20x         dooway ER stretch    :10 x 5         Towel IR stretch             Wall push up             TR/HR    20x         Mini squat   10x         Treadmill                           HEP Instructed and issued HEP           Ther Activity                                         Gait Training                                         Modalities

## 2020-08-25 ENCOUNTER — APPOINTMENT (OUTPATIENT)
Dept: PHYSICAL THERAPY | Facility: CLINIC | Age: 70
End: 2020-08-25
Payer: COMMERCIAL

## 2020-08-28 NOTE — PROGRESS NOTES
Assessment and Plan:   Ms Lottie Washington is a 26-year-old female with history significant for bilateral shoulder osteoarthritis and obesity, who presents for follow-up of diffuse joint pains        - Iris presents today for follow-up of diffuse arthralgias which she has been experiencing over the past 15-20 years and overall appears to be stable but interfering with her activities of daily living  These symptoms were managed with NSAIDs as needed, but she did discontinue them due to concerns for long-term side effects  Her main worry at this time is an episode of acute left shoulder pain radiating upwards into her head and neck that resolved spontaneously within 2 days  She was admitted to the hospital (7/2020) for cardiac workup which was all unrevealing  She has since seen Orthopedics and was diagnosed with bilateral shoulder osteoarthritis as well as rotator cuff tendinosis, for which she completed a few sessions of physical therapy and will continue with home exercises  She is also going to continue follow-up with orthopedics for this  At this time she reports that she is back to experiencing her chronic arthralgias without acuity to her symptoms     - To further evaluate her chronic complaints, additional workup was completed which showed a borderline positive anti CCP antibody as well as a chronically elevated C reactive protein  The x-rays were notable for findings of osteoarthritis at multiple joints without concerns for an inflammatory arthropathy  Given her overall presentation of chronic diffuse pain without features suggestive of an inflammatory arthritis, as well as evidence of myofascial tenderness, her diagnosis appears to be consistent with fibromyalgia and primary generalized osteoarthritis  I discussed this with her in depth today and advised her for the osteoarthritis, especially affecting her knees she should follow-up with orthopedics    I will also provide her with a referral to physical therapy  As the chronic pain has been interfering with her daily functioning as well as contributing to mild anxiety/depression, I offered her the option to start Cymbalta at 60 mg once daily  I will see her back in the office in 3 months to assess her response to this  - Given the borderline positive anti CCP antibody as well as the elevated C reactive protein, I do not have concerns for an inflammatory condition at this time, but will continue to monitor      - In regards to the chronic intermittent skin rashes she has been experiencing, I advised her to establish with Dermatology even if she does not have an active rash  She was provided with a referral today        Plan:  Diagnoses and all orders for this visit:    Fibromyalgia  -     DULoxetine (CYMBALTA) 60 mg delayed release capsule; Take 1 capsule (60 mg total) by mouth daily    Cyclic citrullinated peptide (CCP) antibody positive    Primary osteoarthritis of shoulders, bilateral    Bilateral primary osteoarthritis of knee  -     Ambulatory referral to Physical Therapy; Future    Primary generalized (osteo)arthritis    Vitamin D insufficiency    Elevated C-reactive protein (CRP)    Skin rash  -     Ambulatory referral to Dermatology; Future    Class 3 severe obesity without serious comorbidity with body mass index (BMI) of 45 0 to 49 9 in adult, unspecified obesity type ([facility] [address] )      Activities as tolerated    Diet: low carb/low fat, more greens/vegetables, adequate hydration  Exercise: try to maintain a low impact exercise regimen as much as possible  Walk for 30 minutes a day for at least 3 days a week    Encouraged to maintain good sleep hygiene  Continue other medications as prescribed by PCP and other specialists        RTC in 3 months  HPI    INITIAL VISIT NOTE (8/2020):  Ms Josie Mcdaniel is a 30-year-old female with history significant for bilateral shoulder osteoarthritis and obesity, who presents for further evaluation of diffuse joint pains  She is self-referred      Patient reports she has experienced chronic joint and muscle aches for more than 15-20 years now, and over time they have more or less been stable  She describes pain that mainly affects her hands, wrists, shoulders, hips and knees (especially with activities)  She denies significant pain of her elbows, ankles or feet  She has only noticed occasional swelling of her ankles  She does experience morning stiffness which affects her diffusely and starts to improve with activities  She reports these chronic complaints have again been stable and what she expects, and was previously able to manage it with ibuprofen 400 mg every 12 hours as needed  She has recently discontinued the NSAIDs due to concerns for side effects related to long-term use  She is currently not taking any over-the-counter pain medications  She did not really see anybody in the past for these symptoms      Last Sunday she had a severe flare-up of pain affecting her left shoulder that was radiating up into her neck, her jaw, left side of her head as well as into her underarm region  She presented to the emergency room for this and had a thorough cardiac workup done which was unrevealing  She had x-rays of her shoulders done which showed osteoarthritis as well as an MRI of her left shoulder which showed rotator cuff tendinosis as well as osteoarthritic changes  Approximately 2 days after the onset of her symptoms, they started to resolve spontaneously and had completely resolved by Wednesday  She reports that those symptoms have not recurred      Otherwise she reports chronic dry eyes related to her history of LASIK surgery, recent dry mouth, a sensation like she has mouth sores but is unable to visualize any ulcerations, a chronic history of skin rash for many years now which she self diagnosed as psoriasis    She reports an itchy raised rash that will occur on areas of her torso but most prominently affecting her lower back region  The rash does not affect her extremities or face  She has been unable to see Dermatology when the rash was active  She does not have any rash today  She reports intermittent swollen glands in her neck  She also reports symptoms consistent with Raynaud's with color changes to white with cold exposure  She denies fevers, night sweats, unintentional weight loss, photosensitivity, nose ulcers, pleuritic chest pain, shortness of breath, abdominal pain, vomiting, diarrhea, blood in stools, blood clots or family history of autoimmune disease  Her mother had some type of arthritis      In addition she reports a chronic history of headaches and other than the episode last Sunday there has been no change in the nature of these headaches  She does not experience scalp tenderness, vision changes or jaw claudication      She was seen by Orthopedics yesterday and was advised to proceed with physical therapy for the shoulder arthritis  She also had testing done with results so far showing a minimally elevated ESR of 32 and an elevated C reactive protein of 21 4  A rheumatoid factor was negative  An GEM screen and Lyme antibody profile are pending  She has previously had elevated inflammatory markers in January 2020 as well      Of note she reports in her 35s she was seen by her primary care physician for varying joint pains and diagnosed with rheumatoid arthritis  She was started on a medication for a few months which completely resolved her joint pains until they recurred in her 40s/50s  She is unsure what the medication was  She has not seen a rheumatologist previously  9/1/2020:  Patient presents for a follow-up today  We reviewed the x-rays of her hands and knees which showed osteoarthritic changes without any findings for an erosive arthropathy  Her labs showed a borderline elevated anti CCP antibody of 21  Her vitamin-D levels were slightly low at 25 4    The C reactive protein was elevated at 21 4 with an ESR of 32  An GEM screen, Sjogren's antibodies, Lyme antibody profile, chronic hepatitis panel, rheumatoid factor and uric acid level were normal     Patient reports since the last office visit she did complete some physical therapy for her left shoulder and states that initially exercises she was doing aggravated her shoulder and knee pain  She is not going back to physical therapy and will continue with some exercises at home  She reports being back to her baseline level of pain and functioning  She does not report any new areas of joint pain, swelling or stiffness  Since the last office visit she did experience an erythematous, painful and itchy rash on her buttocks region which she has had in the past   She shows me pictures of this and it is unclear what this is representative of  She has not seen Dermatology in the past, but states for many years now she has been getting different types of recurrent skin rashes  The following portions of the patient's history were reviewed and updated as appropriate: allergies, current medications, past family history, past medical history, past social history, past surgical history and problem list       Review of Systems  Constitutional: Negative for fevers, chills, night sweats  Positive for fatigue and weight gain  ENT/Mouth: Negative for hearing changes, ear pain, nasal congestion, sinus pain, hoarseness, sore throat, rhinorrhea, swallowing difficulty  Eyes: Negative for pain, redness, discharge, vision changes  Cardiovascular: Negative for chest pain, SOB, palpitations  Respiratory: Negative for cough, sputum, wheezing, dyspnea  Gastrointestinal: Negative for nausea, vomiting, diarrhea, constipation, pain, heartburn  Genitourinary: Negative for dysuria, urinary frequency, hematuria  Musculoskeletal: As per HPI  Skin: Negative for color changes  Positive for skin rash    Neuro: Negative for weakness, numbness, tingling, loss of consciousness  Psych:  Positive for mild anxiety, depression  Heme/Lymph: Negative for easy bruising, bleeding, lymphadenopathy          Past Medical History:   Diagnosis Date    GERD (gastroesophageal reflux disease)     Obesity        Past Surgical History:   Procedure Laterality Date    CARDIAC CATHETERIZATION  2020     SECTION      ESOPHAGOGASTRODUODENOSCOPY      OVARIAN CYST REMOVAL         Social History     Socioeconomic History    Marital status: /Civil Union     Spouse name: Not on file    Number of children: Not on file    Years of education: Not on file    Highest education level: Not on file   Occupational History    Not on file   Social Needs    Financial resource strain: Not on file    Food insecurity     Worry: Not on file     Inability: Not on file   [employer] needs     Medical: Not on file     Non-medical: Not on file   Tobacco Use    Smoking status: Never Smoker    Smokeless tobacco: Never Used   Substance and Sexual Activity    Alcohol use: Yes     Frequency: Monthly or less     Drinks per session: 1 or 2    Drug use: Never    Sexual activity: Not Currently   Lifestyle    Physical activity     Days per week: Not on file     Minutes per session: Not on file    Stress: Not on file   Relationships    Social connections     Talks on phone: Not on file     Gets together: Not on file     Attends Jew service: Not on file     Active member of club or organization: Not on file     Attends meetings of clubs or organizations: Not on file     Relationship status: Not on file    Intimate partner violence     Fear of current or ex partner: Not on file     Emotionally abused: Not on file     Physically abused: Not on file     Forced sexual activity: Not on file   Other Topics Concern    Not on file   Social History Narrative    Not on file       Family History   Problem Relation Age of Onset    Hypertension Mother     Arthritis Mother     Heart disease Father        Allergies   Allergen Reactions    Penicillins      dizziness    Latex Rash       Current Outpatient Medications:     acetaminophen (TYLENOL) 325 mg tablet, Take 2 tablets (650 mg total) by mouth every 4 (four) hours as needed for mild pain or headaches, Disp: 30 tablet, Rfl: 0    Ascorbic Acid (vitamin C) 1000 MG tablet, Take 1,000 mg by mouth daily, Disp: , Rfl:     aspirin 81 mg chewable tablet, Chew 1 tablet (81 mg total) daily, Disp: 30 tablet, Rfl: 0    atorvastatin (LIPITOR) 40 mg tablet, Take 1 tablet (40 mg total) by mouth every evening, Disp: 90 tablet, Rfl: 3    b complex vitamins capsule, Take 1 capsule by mouth daily, Disp: , Rfl:     calcium carbonate (OS-SUSIE) 600 MG tablet, Take 600 mg by mouth 2 (two) times a day with meals, Disp: , Rfl:     carvedilol (COREG) 12 5 mg tablet, Take 1 tablet (12 5 mg total) by mouth 2 (two) times a day with meals, Disp: 180 tablet, Rfl: 3    Cholecalciferol (Vitamin D-3) 25 MCG (1000 UT) CAPS, Take 1,000 Units by mouth, Disp: , Rfl:     Coenzyme Q10 (CoQ10) 100 MG CAPS, Take by mouth, Disp: , Rfl:     cycloSPORINE (RESTASIS) 0 05 % ophthalmic emulsion, 1 drop, Disp: , Rfl:     DULoxetine (CYMBALTA) 60 mg delayed release capsule, Take 1 capsule (60 mg total) by mouth daily, Disp: 30 capsule, Rfl: 2    esomeprazole (NexIUM) 20 mg capsule, Take 20 mg by mouth 2 (two) times a day, Disp: , Rfl:     Krill Oil Omega-3 500 MG CAPS, Take by mouth, Disp: , Rfl:     lisinopril (ZESTRIL) 10 mg tablet, Take 1 tablet (10 mg total) by mouth daily (Patient not taking: Reported on 9/1/2020), Disp: 90 tablet, Rfl: 3    Multiple Vitamin (Multi Vitamin Daily) TABS, Take by mouth, Disp: , Rfl:     nitroglycerin (NITROSTAT) 0 4 mg SL tablet, Place 1 tablet (0 4 mg total) under the tongue every 5 (five) minutes as needed for chest pain, Disp: 30 tablet, Rfl: 0    Plant Sterols and Stanols (CHOLESTOFF PO), Take 1 tablet by mouth daily, Disp: , Rfl:    Turmeric 500 MG CAPS, Take 500 mg by mouth, Disp: , Rfl:       Objective:    Vitals:    09/01/20 0840   BP: 138/84   Pulse: 64   Temp: (!) 97 4 °F (36 3 °C)       Physical Exam  General: Well appearing, well nourished, in no distress  Oriented x 3, normal mood and affect  Ambulating without difficulty  Obese  Skin: Good turgor, no rash, unusual bruising or prominent lesions  No obvious skin rashes noted today  Hair: Normal texture and distribution  Nails: Normal color, no deformities  HEENT:  Head: Normocephalic, atraumatic  Eyes: Conjunctiva clear, sclera non-icteric, EOM intact  Extremities: No amputations or deformities, cyanosis, edema  Neurologic: Alert and oriented  No focal neurological deficits appreciated  Psychiatric: Normal mood and affect  VANESSA Fuentes    Rheumatology

## 2020-09-01 ENCOUNTER — OFFICE VISIT (OUTPATIENT)
Dept: RHEUMATOLOGY | Facility: CLINIC | Age: 70
End: 2020-09-01
Payer: COMMERCIAL

## 2020-09-01 VITALS — TEMPERATURE: 97.4 F | SYSTOLIC BLOOD PRESSURE: 138 MMHG | DIASTOLIC BLOOD PRESSURE: 84 MMHG | HEART RATE: 64 BPM

## 2020-09-01 DIAGNOSIS — M19.011 PRIMARY OSTEOARTHRITIS OF SHOULDERS, BILATERAL: ICD-10-CM

## 2020-09-01 DIAGNOSIS — E55.9 VITAMIN D INSUFFICIENCY: ICD-10-CM

## 2020-09-01 DIAGNOSIS — M17.0 BILATERAL PRIMARY OSTEOARTHRITIS OF KNEE: ICD-10-CM

## 2020-09-01 DIAGNOSIS — R21 SKIN RASH: ICD-10-CM

## 2020-09-01 DIAGNOSIS — R79.82 ELEVATED C-REACTIVE PROTEIN (CRP): ICD-10-CM

## 2020-09-01 DIAGNOSIS — E66.01 CLASS 3 SEVERE OBESITY WITHOUT SERIOUS COMORBIDITY WITH BODY MASS INDEX (BMI) OF 45.0 TO 49.9 IN ADULT, UNSPECIFIED OBESITY TYPE (HCC): ICD-10-CM

## 2020-09-01 DIAGNOSIS — M15.0 PRIMARY GENERALIZED (OSTEO)ARTHRITIS: ICD-10-CM

## 2020-09-01 DIAGNOSIS — R76.8 CYCLIC CITRULLINATED PEPTIDE (CCP) ANTIBODY POSITIVE: ICD-10-CM

## 2020-09-01 DIAGNOSIS — M19.012 PRIMARY OSTEOARTHRITIS OF SHOULDERS, BILATERAL: ICD-10-CM

## 2020-09-01 DIAGNOSIS — M79.7 FIBROMYALGIA: Primary | ICD-10-CM

## 2020-09-01 PROCEDURE — 99214 OFFICE O/P EST MOD 30 MIN: CPT | Performed by: INTERNAL MEDICINE

## 2020-09-01 RX ORDER — DULOXETIN HYDROCHLORIDE 60 MG/1
60 CAPSULE, DELAYED RELEASE ORAL DAILY
Qty: 30 CAPSULE | Refills: 2 | Status: SHIPPED | OUTPATIENT
Start: 2020-09-01 | End: 2020-12-01

## 2020-09-11 ENCOUNTER — OFFICE VISIT (OUTPATIENT)
Dept: OBGYN CLINIC | Facility: CLINIC | Age: 70
End: 2020-09-11
Payer: COMMERCIAL

## 2020-09-11 VITALS
BODY MASS INDEX: 46.42 KG/M2 | SYSTOLIC BLOOD PRESSURE: 157 MMHG | HEART RATE: 54 BPM | TEMPERATURE: 97.5 F | WEIGHT: 262 LBS | HEIGHT: 63 IN | DIASTOLIC BLOOD PRESSURE: 94 MMHG

## 2020-09-11 DIAGNOSIS — M62.9 HAMSTRING TIGHTNESS OF BOTH LOWER EXTREMITIES: ICD-10-CM

## 2020-09-11 DIAGNOSIS — M17.0 PRIMARY OSTEOARTHRITIS OF BOTH KNEES: Primary | ICD-10-CM

## 2020-09-11 DIAGNOSIS — M22.2X2 PATELLOFEMORAL SYNDROME OF BOTH KNEES: ICD-10-CM

## 2020-09-11 DIAGNOSIS — R29.898 WEAKNESS OF BOTH HIPS: ICD-10-CM

## 2020-09-11 DIAGNOSIS — M22.2X1 PATELLOFEMORAL SYNDROME OF BOTH KNEES: ICD-10-CM

## 2020-09-11 PROCEDURE — 99214 OFFICE O/P EST MOD 30 MIN: CPT | Performed by: FAMILY MEDICINE

## 2020-09-11 RX ORDER — MELOXICAM 7.5 MG/1
7.5 TABLET ORAL DAILY
Qty: 30 TABLET | Refills: 1 | Status: SHIPPED | OUTPATIENT
Start: 2020-09-11 | End: 2020-12-01

## 2020-09-11 NOTE — PROGRESS NOTES
Assessment/Plan:  Assessment/Plan   Diagnoses and all orders for this visit:    Primary osteoarthritis of both knees  -     meloxicam (MOBIC) 7 5 mg tablet; Take 1 tablet (7 5 mg total) by mouth daily  -     Ambulatory referral to Physical Therapy; Future    Patellofemoral syndrome of both knees  -     Ambulatory referral to Physical Therapy; Future    Weakness of both hips  -     Ambulatory referral to Physical Therapy; Future    Hamstring tightness of both lower extremities  -     Ambulatory referral to Physical Therapy; Future      68-year-old female with right knee pain of many years duration  Discussed with patient physical exam, radiographs, impression and plan  X-rays of the right knee noted for significant osteoarthritis with medial joint space narrowing  Physical noted for tenderness both knees at the medial joint lines  She has full extension and flexion to 110° bilaterally  There is no appreciable collateral ligament laxity  There is palpable crepitus with range of motion  There is no groin pain with PETEY and FADDIR maneuvers of the hips  Clinical impression is that she is symptomatic from combination of osteoarthritis and patellofemoral syndrome  I discussed regimen of anti-inflammatory, supplements, and physical therapy  She may take tumeric 500 mg twice daily, tart cherry 1000 mg daily, and glucosamine-chondroitin supplements  She may take meloxicam 7 5 mg once daily food and while meloxicam not to take any ibuprofen or Aleve  She was advised if she is concerned about adverse effects of meloxicam she may continue with taking ibuprofen  She is to start physical therapy as soon as possible and do home exercises as directed she declines corticosteroid injection  She will follow up me in 6 weeks at which point she will be re-evaluated  Subjective:   Patient ID: Domitila Osorio is a 81 y o  female    Chief Complaint   Patient presents with    Right Knee - Pain, Clicking       [id number] female presents for evaluation of bilateral knee pain, right worse than left of many years duration  She has pain described as generalized to the knees but worse at the anterior and medial aspects, intermittent, achy and sometimes sharp, nonradiating, worse with prolonged standing ambulation, worse with twisting, associated stiffness that is worse in the morning, and improved with stretching and changing position  She takes Advil and Tylenol which she states helps with her symptoms  She has recently started taking tumeric and glucosamine-chondroitin supplements  Knee Pain   This is a chronic problem  The current episode started more than 1 year ago  The problem occurs daily  The problem has been gradually worsening  Associated symptoms include arthralgias and joint swelling  Pertinent negatives include no abdominal pain, chest pain, chills, fever, numbness, rash, sore throat or weakness  The symptoms are aggravated by standing, walking, twisting and bending  She has tried rest, NSAIDs, position changes and acetaminophen for the symptoms  The treatment provided mild relief  The following portions of the patient's history were reviewed and updated as appropriate: She  has a past medical history of GERD (gastroesophageal reflux disease) and Obesity  She  has a past surgical history that includes  section; Ovarian cyst removal; Esophagogastroduodenoscopy; and Cardiac catheterization (2020)  Her family history includes Arthritis in her mother; Heart disease in her father; Hypertension in her mother  She  reports that she has never smoked  She has never used smokeless tobacco  She reports current alcohol use  She reports that she does not use drugs  She is allergic to penicillins and latex       Review of Systems   Constitutional: Negative for chills and fever  HENT: Negative for sore throat  Eyes: Negative for visual disturbance  Respiratory: Negative for shortness of breath  Cardiovascular: Negative for chest pain  Gastrointestinal: Negative for abdominal pain  Genitourinary: Negative for flank pain  Musculoskeletal: Positive for arthralgias and joint swelling  Skin: Negative for rash and wound  Neurological: Negative for weakness and numbness  Hematological: Does not bruise/bleed easily  Psychiatric/Behavioral: Negative for self-injury  Objective:  Vitals:    09/11/20 0801   BP: 157/94   Pulse: (!) 54   Temp: 97 5 °F (36 4 °C)   Weight: 119 kg (262 lb)   Height: 5' 3" (1 6 m)     Right Ankle Exam     Muscle Strength   Dorsiflexion:  5/5  Plantar flexion:  5/5      Left Ankle Exam     Muscle Strength   Dorsiflexion:  5/5   Plantar flexion:  5/5       Right Knee Exam     Muscle Strength   The patient has normal right knee strength  Tenderness   The patient is experiencing tenderness in the medial joint line and patella  Range of Motion   Extension: normal   Flexion: 110     Tests   Varus: negative Valgus: negative    Other   Swelling: none      Left Knee Exam     Muscle Strength   The patient has normal left knee strength  Tenderness   The patient is experiencing tenderness in the medial joint line and patella  Range of Motion   Extension: normal   Flexion: 110     Tests   Varus: negative Valgus: negative    Other   Swelling: none      Right Hip Exam     Muscle Strength   Flexion: 5/5     Tests   PETEY: negative    Comments:  Negative FADDIR      Left Hip Exam     Muscle Strength   Flexion: 5/5     Tests   PETEY: negative    Comments:  Negative FADDIR          Strength/Myotome Testing     Left Ankle/Foot   Dorsiflexion: 5  Plantar flexion: 5    Right Ankle/Foot   Dorsiflexion: 5  Plantar flexion: 5      Physical Exam  Vitals signs and nursing note reviewed  Constitutional:       General: She is not in acute distress  Appearance: She is well-developed  She is not ill-appearing or diaphoretic  HENT:      Head: Normocephalic     Eyes: Conjunctiva/sclera: Conjunctivae normal    Neck:      Trachea: No tracheal deviation  Cardiovascular:      Comments: Bradycardic  Pulmonary:      Effort: Pulmonary effort is normal  No respiratory distress  Abdominal:      General: There is no distension  Musculoskeletal:         General: Tenderness present  No deformity or signs of injury  Right lower leg: No edema  Left lower leg: No edema  Skin:     General: Skin is warm and dry  Coloration: Skin is not jaundiced or pale  Neurological:      Mental Status: She is alert and oriented to person, place, and time  Psychiatric:         Mood and Affect: Mood normal          Behavior: Behavior normal          Thought Content: Thought content normal          Judgment: Judgment normal          I have personally reviewed pertinent films in PACS and my interpretation is Medial joint space narrowing of the right knee

## 2020-09-16 ENCOUNTER — EVALUATION (OUTPATIENT)
Dept: PHYSICAL THERAPY | Facility: CLINIC | Age: 70
End: 2020-09-16
Payer: COMMERCIAL

## 2020-09-16 DIAGNOSIS — M17.0 BILATERAL PRIMARY OSTEOARTHRITIS OF KNEE: Primary | ICD-10-CM

## 2020-09-16 PROCEDURE — 97161 PT EVAL LOW COMPLEX 20 MIN: CPT | Performed by: PHYSICAL THERAPIST

## 2020-09-16 NOTE — PROGRESS NOTES
PT Evaluation     Today's date: 2020  Patient name: Talia Matthews  : 1950  MRN: [id number]  Referring provider: Rhonda Veras MD  Dx:   Encounter Diagnosis     ICD-10-CM    1  Bilateral primary osteoarthritis of knee  M17 0 Ambulatory referral to Physical Therapy                  Assessment  Assessment details: Patient was provided a home exercise program and demonstrated an understanding of exercises  Patient was advised to stop performing home exercise program if symptoms increase or new complaints developed  Verbal understanding demonstrated regarding home exercise program instructions  Patient would benefit from skilled physical therapy services for prescribed exercises, manual interventions, neuromuscular re-education, education, and modalities as deemed appropriate to assist patient in achieving their maximum level of function  Patient presents with c/o right knee pain and only occasional left knee pain  Evaluation reveals (+) PFPS right with improved symptoms after trial kinesiotape for PF tracking  Patient has (+) Valgus testing at 30 degrees flexion with medial joint line pain produced as well as a click/grinding sensation, end range pain with both flexion and extension, decreased quad contraction right vs left  She demonstrated antalgic movements with descending steps - improved significantly with taping  Patient presents motivated to reduce/ abolish her pain and improve her functional movements  Impairments: abnormal gait, abnormal or restricted ROM, abnormal movement, activity intolerance, impaired physical strength, lacks appropriate home exercise program and pain with function  Understanding of Dx/Px/POC: good  Goals  STG   1  Patient will demonstrate independence and competence with HEP 2 -4 weeks  2  Patient will report > 25-50% reduced pain 2-4 weeks    LTG   1  Patient will report improvements with both functional and recreational abilities  4-6 weeks  2  Patient will demonstrate improved motor function  4-6 weeks  3   Patient will tolerated prolonged walking as well as stair climbing without exacerbation of right knee pain - ongoing  4  Patient will demonstrate functional AROM without painful end range  6-8 weeks  Plan  Plan details: Patient response to treatment will be monitored each session and progressed accordingly    Thank you for this referral    Patient would benefit from: skilled physical therapy  Planned modality interventions: thermotherapy: hydrocollator packs  Other planned modality interventions: kinesiotape right knee  Planned therapy interventions: IADL retraining, manual therapy, patient education, postural training, strengthening, stretching, therapeutic exercise, flexibility, home exercise program and neuromuscular re-education  Frequency: 2x week  Duration in weeks: 8  Treatment plan discussed with: patient        Subjective Evaluation    History of Present Illness  Mechanism of injury: Patient was diagnosed with FM in 2020  It started with her feeling that she was have an MI  She went to ED and was cleared cardiac wise  At the hospital, they brought in orthopedic MD to look at her left shoulder due to pain which radiated into her left jaw/ head  She spent 3 days in hospital   She was diagnosed with FM due to various pain around her body  X-rays in hospital  Revealed OA in L > R knees  She was getting PT at our [facility] and was not happy with her care, thus she is coming to our facility now  Patient reports that she has pain at night - she denies buckling, locking, falls  She does report a lot of clicking  Patient does not drive  She has had to lessen her time on her treadmill due to pain and this is an activity that she would like to get back to  Patient reports that she gets intermittent bouts of right sciatica with pain posterior leg to ankle      Pain  Current pain ratin  At worst pain rating: 10  Quality: sharp  Aggravating factors: stair climbing and walking  Progression: no change    Social Support  Lives in: multiple-level home  Lives with: spouse    Patient Goals  Patient goals for therapy: independence with ADLs/IADLs and decreased pain  Patient goal: "I want to be able to exercise , lose weight without pain"        Objective     Observations     Additional Observation Details  Patient has large knees due to adipose -     Tenderness     Right Knee   Tenderness in the inferior fat pad, inferior patella, medial joint line and pes anserinus  Neurological Testing     Sensation     Knee   Left Knee   Intact: light touch    Right Knee   Intact: light touch     Active Range of Motion   Left Knee   Flexion: 118 degrees   Extension: 0 degrees     Right Knee   Flexion: 116 degrees with pain  Extension: -2 degrees     Passive Range of Motion   Left Knee   Flexion: 122 degrees     Right Knee   Flexion: 121 degrees with pain  Extension: 0 degrees with pain    Strength/Myotome Testing     Left Knee   Flexion: 5  Extension: 5  Quadriceps contraction: good    Right Knee   Flexion: 4+  Quadriceps contraction: fair    Additional Strength Details  Pain posterior right knee with resisted hs testing  Tests     Right Knee   Positive valgus stress test at 30 degrees  Negative anterior drawer, anterior Lachman, medial Ebenezer, posterior drawer, valgus stress test at 0 degrees, varus stress test at 0 degrees and varus stress test at 30 degrees  General Comments:      Knee Comments  Steps - step thru ascending with handrail, step-to descending with hand rail   ( improved with taping with reported significantly less pain descending )  Gait - slowed, heel -toe progression , R > L sits in slight ER posturing                Precautions: bilateral R > L knee pain       Manuals 9/16            FOTO  db                         kinesiotape right patella db                         Neuro Re-Ed             Qs, add sets 3s x 20                                                                                           Ther Ex             bike             slr , s/l abd 20x ea            hss w/strap 20s x 5            saq             Heel slide 20x            laq / hip flexion                                                                                                         Ther Activity                                       Gait Training                                       Modalities

## 2020-09-18 NOTE — PROGRESS NOTES
PT DISCHARGE     Today's date: 2020  Patient name: Tonja Gannon  : 1950  MRN: [id number]  Referring provider: Opal Douglass PA-C  Dx:   Encounter Diagnosis     ICD-10-CM    1  Impingement syndrome of right shoulder  M75 41    2  Impingement syndrome of left shoulder  M75 42    3  Multiple joint pain  M25 50    4  Chronic left shoulder pain  M25 512     G89 29        Start Time: 1300  Stop Time: 1345  Total time in clinic (min): 45 minutes    Assessment  Assessment details: Pt presented with chronic pain multiple regions of body  Reported recent increase in left shoulder pain  Was seen in ER to rule out cardiac involvement  Reported seeing Rheumatologist and diagnosed with Fibromyalgia  Reported pain varies in intensity throughout the day as well as location  Hypersensitivity reported in all joint  Reported difficulty with ADL's due to pain  Reported exercise had helped in the past with symptoms  Pt attended 2 total PT sessions  Last attended session was on 20  No significant change in status noted  D/C PT services  Impairments: abnormal gait, abnormal or restricted ROM, activity intolerance, impaired physical strength, lacks appropriate home exercise program and pain with function     Prognosis: fair    Goals  ST  Decrease pain 25% 6 wk  2  Increase bilateral shoulder AROM to 150-160 elevation 6 wk  3  Increase BUE/BLE strength by 1/2 MMT grade 6 wk  4  Pt will begin walking on treadmill for up to 10 minutes 6 wk  LT  Pt will report 50% decrease in pain 12 wk  2  Increase BUE AROM to WNL 12 wk  3  Increase BUE/BLE strength to WNL 12 wk  4  Pt will report no limitations with ADL's 12 wk  5    Pt will report no limitations with ambulation 12 wk    Plan  Plan details: D/C PT services  Findings per initial evaluation   Patient would benefit from: PT eval and skilled physical therapy  Planned modality interventions: cryotherapy and thermotherapy: hydrocollator packs  Planned therapy interventions: abdominal trunk stabilization, manual therapy, joint mobilization, neuromuscular re-education, strengthening, stretching, therapeutic activities, therapeutic exercise, flexibility, functional ROM exercises and home exercise program        Subjective Evaluation    History of Present Illness  Mechanism of injury: Pt reports hx chronic pain multiple regions of body 20-30 years  Reports approx one month ago she developed pain left shoulder that radiated to neck, chest, and LUE  Went to ER and testing negative for cardiac involvement  Had full testing for GERD as well  Was referred to Rheumatology and diagnosed with Fibromyalgia  Reports pain worse the past few weeks  Pain noted bilateral shoulder, left greater that right, back and above waist   Reports pain generally in all regions of body  Reports all joints hypersensitive to light touch  Reports difficulty walking due to BLE pain  Difficulty holding objects due to bilateral hand pain  X-rays revealed arthritic changes  No pattern to symptoms  Pain varies daily  Weather changes impact symptoms    Reports exercises has helped in the past    Pain  Current pain ratin  At best pain rating: 3  At worst pain ratin  Quality: sharp, dull ache and pressure  Relieving factors: heat and medications  Progression: worsening    Hand dominance: right  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, stained glass work (arts/crafting)    Patient Goals  Patient goals for therapy: decreased pain, increased strength and return to sport/leisure activities  Patient goal: Return to use of treadmill        Objective     Postural Observations    Additional Postural Observation Details  Forward rounded shoulders  Forward head    Active Range of Motion   Cervical/Thoracic Spine       Cervical    Flexion:  WFL and with pain  Extension:  with pain Restriction level: minimal  Left lateral flexion:  with pain Restriction level: minimal  Right lateral flexion: with pain Restriction level minimal  Left rotation:  with pain Restriction level: minimal  Right rotation:  with pain Restriction level: minimal  Left Shoulder   Flexion: 118 degrees with pain  Abduction: 126 degrees with pain  External rotation BTH: C5 with pain  Internal rotation BTB: L3 with pain    Right Shoulder   Flexion: 145 degrees   Abduction: 145 degrees   External rotation BTH: C3   Internal rotation BTB: L3     Lumbar   Flexion:  with pain Restriction level: minimal  Extension:  with pain Restriction level: minimal  Left lateral flexion:  with pain Restriction level: minimal  Right lateral flexion:  with pain Restriction level: minimal  Left rotation:  with pain Restriction level: minimal  Right rotation:  with pain Restriction level: minimal    Additional Active Range of Motion Details  Pt needs to flex left elbow prior to lowering UE from elevated position due to pain      Strength/Myotome Testing     Left Shoulder     Planes of Motion   Flexion: 3+   Abduction: 3+   External rotation at 0°: 3+   Internal rotation at 0°: 3+     Isolated Muscles   Upper trapezius: 4-     Right Shoulder     Planes of Motion   Flexion: 3+   Abduction: 3+   External rotation at 0°: 3+   Internal rotation at 0°: 3+     Isolated Muscles   Upper trapezius: 4-     Left Elbow   Flexion: 3+  Extension: 3+    Right Elbow   Flexion: 3+  Extension: 3+    Left Hip   Planes of Motion   Flexion: 3+  Abduction: 3+    Right Hip   Planes of Motion   Flexion: 3+  Abduction: 3+    Left Knee   Flexion: 3+  Extension: 4-    Right Knee   Flexion: 3+  Extension: 4-    Left Ankle/Foot   Dorsiflexion: 4-  Plantar flexion: 4-    Right Ankle/Foot   Dorsiflexion: 4-  Plantar flexion: 4-    Additional Strength Details  Fair seated resisted trunk strength all planes     Ambulation     Observational Gait   Gait: within functional limits     Additional Observational Gait Details  Reports "shifting" feeling in right knee with gait  Neuro Exam: Functional outcomes   5x sit to stand: 14 03 (seconds)  TUG: 10 89 (seconds)

## 2020-09-23 ENCOUNTER — OFFICE VISIT (OUTPATIENT)
Dept: PHYSICAL THERAPY | Facility: CLINIC | Age: 70
End: 2020-09-23
Payer: COMMERCIAL

## 2020-09-23 DIAGNOSIS — M25.50 MULTIPLE JOINT PAIN: ICD-10-CM

## 2020-09-23 DIAGNOSIS — M25.512 CHRONIC LEFT SHOULDER PAIN: ICD-10-CM

## 2020-09-23 DIAGNOSIS — M75.42 IMPINGEMENT SYNDROME OF LEFT SHOULDER: ICD-10-CM

## 2020-09-23 DIAGNOSIS — M75.41 IMPINGEMENT SYNDROME OF RIGHT SHOULDER: ICD-10-CM

## 2020-09-23 DIAGNOSIS — M17.0 BILATERAL PRIMARY OSTEOARTHRITIS OF KNEE: Primary | ICD-10-CM

## 2020-09-23 DIAGNOSIS — G89.29 CHRONIC LEFT SHOULDER PAIN: ICD-10-CM

## 2020-09-23 PROCEDURE — 97110 THERAPEUTIC EXERCISES: CPT

## 2020-09-23 PROCEDURE — 97112 NEUROMUSCULAR REEDUCATION: CPT

## 2020-09-23 NOTE — PROGRESS NOTES
Daily Note     Today's date: 2020  Patient name: Artis Ness  : 1950  MRN: [id number]  Referring provider: Cherri Olvera MD  Dx:   Encounter Diagnosis     ICD-10-CM    1  Bilateral primary osteoarthritis of knee  M17 0    2  Impingement syndrome of right shoulder  M75 41    3  Impingement syndrome of left shoulder  M75 42    4  Multiple joint pain  M25 50    5  Chronic left shoulder pain  M25 512     G89 29                   Subjective: Upon presentation, SPR=8/10   "I woke up with pain, it wakes me in the middle of the night and most times I am able to go back to sleep"  SPR post today's intervention =0/10! Objective: See treatment diary below      Assessment: Tolerated treatment and progression of present program without limitations or exacerbation of joint pain  Patient demonstrated improved hamstring flexibility and improved pain free right knee AROM post today's session  Instructed patient in seated and supine hamstring stretching for options  Patient would benefit from continued PT      Plan: Continue per plan of care  Progress treatment as tolerated  Precautions: bilateral R > L knee pain       Manuals            FOTO  db                         kinesiotape right patella db allergic reaction to tape!                         Neuro Re-Ed             Qs, add sets 3s x 20  :03  20x each                                                                                         Ther Ex             bike  4 5'  Self pace           slr , s/l abd 20x ea B:  10x2 each           hss w/strap 20s x 5 :20  5x  (Also seated)           saq  :05  20x           Heel slide 20x B:  20x           laq / hip flexion   B  20x each           Standing B: hip flexion, abd, ext, heel raises and knee flexion                                                                                           Ther Activity                                       Gait Training Modalities

## 2020-09-25 ENCOUNTER — OFFICE VISIT (OUTPATIENT)
Dept: PHYSICAL THERAPY | Facility: CLINIC | Age: 70
End: 2020-09-25
Payer: COMMERCIAL

## 2020-09-25 DIAGNOSIS — M17.0 BILATERAL PRIMARY OSTEOARTHRITIS OF KNEE: Primary | ICD-10-CM

## 2020-09-25 PROCEDURE — 97112 NEUROMUSCULAR REEDUCATION: CPT | Performed by: PHYSICAL THERAPIST

## 2020-09-25 PROCEDURE — 97110 THERAPEUTIC EXERCISES: CPT | Performed by: PHYSICAL THERAPIST

## 2020-09-25 NOTE — PROGRESS NOTES
Daily Note     Today's date: 2020  Patient name: Keyon Maier  : 1950  MRN: [id number]  Referring provider: Parul Bright MD  Dx:   Encounter Diagnosis     ICD-10-CM    1  Bilateral primary osteoarthritis of knee  M17 0                   Subjective: Patient reports that she was sore this morning upon waking - but now feels "pretty good"  2/10 VPS post session    Objective: See treatment diary below      Assessment: Tolerated treatment and progression of present program without limitations or exacerbation of joint pain  Patient demonstrated improved hamstring flexibility and improved pain free right knee AROM post today's session  Instructed patient in seated and supine hamstring stretching for options  Patient would benefit from continued PT      Plan: Continue per plan of care  Progress treatment as tolerated  Precautions: bilateral R > L knee pain       Manuals           FOTO  db                         kinesiotape right patella db allergic reaction to tape!                         Neuro Re-Ed             Qs, add sets 3s x 20  :03  20x each 3s x 20                                                                                        Ther Ex             bike  4 5'  Self pace High x 10'          slr , s/l abd 20x ea B:  10x2 each 20x ea           hss w/strap 20s x 5 :20  5x  (Also seated) 20s x 5          saq  :05  20x 3s x 20           Heel slide 20x B:  20x 20x           laq / hip flexion   B  20x each 20x ea           Standing B: HR, abd   20x ea          Stand HS curls, hip ext                                                                               Ther Activity                                       Gait Training                                       Modalities

## 2020-09-30 ENCOUNTER — OFFICE VISIT (OUTPATIENT)
Dept: PHYSICAL THERAPY | Facility: CLINIC | Age: 70
End: 2020-09-30
Payer: COMMERCIAL

## 2020-09-30 DIAGNOSIS — G89.29 CHRONIC LEFT SHOULDER PAIN: ICD-10-CM

## 2020-09-30 DIAGNOSIS — M25.50 MULTIPLE JOINT PAIN: ICD-10-CM

## 2020-09-30 DIAGNOSIS — M17.0 BILATERAL PRIMARY OSTEOARTHRITIS OF KNEE: Primary | ICD-10-CM

## 2020-09-30 DIAGNOSIS — M75.41 IMPINGEMENT SYNDROME OF RIGHT SHOULDER: ICD-10-CM

## 2020-09-30 DIAGNOSIS — M25.512 CHRONIC LEFT SHOULDER PAIN: ICD-10-CM

## 2020-09-30 DIAGNOSIS — M75.42 IMPINGEMENT SYNDROME OF LEFT SHOULDER: ICD-10-CM

## 2020-09-30 PROCEDURE — 97110 THERAPEUTIC EXERCISES: CPT | Performed by: PHYSICAL THERAPIST

## 2020-09-30 PROCEDURE — 97112 NEUROMUSCULAR REEDUCATION: CPT | Performed by: PHYSICAL THERAPIST

## 2020-09-30 NOTE — PROGRESS NOTES
Daily Note     Today's date: 2020  Patient name: Katia Mckeon  : 1950   MRN: [id number]  Referring provider: Juni Silva MD  Dx:   Encounter Diagnosis     ICD-10-CM    1  Bilateral primary osteoarthritis of knee  M17 0    2  Impingement syndrome of right shoulder  M75 41    3  Multiple joint pain  M25 50    4  Impingement syndrome of left shoulder  M75 42    5  Chronic left shoulder pain  M25 512     G89 29                   Subjective: Patient feels that all of her pain is converging together - her knee, her back and her fibromyalgia pain  She notes after session that she feels looser and that she is moving better  Objective: See treatment diary below      Assessment: Tolerated treatment and progression of present program without limitations or exacerbation of joint pain  Plan: Continue per plan of care  Progress treatment as tolerated  Precautions: bilateral R > L knee pain       Manuals          FOTO  db                         kinesiotape right patella db allergic reaction to tape!                         Neuro Re-Ed             Qs, add sets 3s x 20  :03  20x each 3s x 20 3s x 20          gs    3s x 20                                                                          Ther Ex             bike  4 5'  Self pace High x 10' 10'          slr , s/l abd 20x ea B:  10x2 each 20x ea  1# x 20 ea         hss w/strap 20s x 5 :20  5x  (Also seated) 20s x 5 20s x 3         saq  :05  20x 3s x 20  1#  3s x 20          Heel slide 20x B:  20x 20x  1# x 20          laq / hip flexion   B  20x each 20x ea  1# laq x 20          Standing B: HR, abd   20x ea 1# x 20 ea         Stand HS curls, hip ext     1# x 20 ea          bridging    3s x 20                                                             Ther Activity                                       Gait Training                                       Modalities             mhp seated LS    8'

## 2020-10-02 ENCOUNTER — OFFICE VISIT (OUTPATIENT)
Dept: PHYSICAL THERAPY | Facility: CLINIC | Age: 70
End: 2020-10-02
Payer: COMMERCIAL

## 2020-10-02 DIAGNOSIS — G89.29 CHRONIC LEFT SHOULDER PAIN: ICD-10-CM

## 2020-10-02 DIAGNOSIS — M25.512 CHRONIC LEFT SHOULDER PAIN: ICD-10-CM

## 2020-10-02 DIAGNOSIS — M25.50 MULTIPLE JOINT PAIN: ICD-10-CM

## 2020-10-02 DIAGNOSIS — M17.0 BILATERAL PRIMARY OSTEOARTHRITIS OF KNEE: Primary | ICD-10-CM

## 2020-10-02 PROCEDURE — 97110 THERAPEUTIC EXERCISES: CPT | Performed by: PHYSICAL THERAPIST

## 2020-10-02 PROCEDURE — 97112 NEUROMUSCULAR REEDUCATION: CPT | Performed by: PHYSICAL THERAPIST

## 2020-10-07 ENCOUNTER — OFFICE VISIT (OUTPATIENT)
Dept: PHYSICAL THERAPY | Facility: CLINIC | Age: 70
End: 2020-10-07
Payer: COMMERCIAL

## 2020-10-07 DIAGNOSIS — M17.0 BILATERAL PRIMARY OSTEOARTHRITIS OF KNEE: Primary | ICD-10-CM

## 2020-10-07 DIAGNOSIS — M25.512 CHRONIC LEFT SHOULDER PAIN: ICD-10-CM

## 2020-10-07 DIAGNOSIS — M75.42 IMPINGEMENT SYNDROME OF LEFT SHOULDER: ICD-10-CM

## 2020-10-07 DIAGNOSIS — M75.41 IMPINGEMENT SYNDROME OF RIGHT SHOULDER: ICD-10-CM

## 2020-10-07 DIAGNOSIS — G89.29 CHRONIC LEFT SHOULDER PAIN: ICD-10-CM

## 2020-10-07 DIAGNOSIS — M25.50 MULTIPLE JOINT PAIN: ICD-10-CM

## 2020-10-07 PROCEDURE — 97110 THERAPEUTIC EXERCISES: CPT

## 2020-10-07 PROCEDURE — 97112 NEUROMUSCULAR REEDUCATION: CPT

## 2020-10-09 ENCOUNTER — OFFICE VISIT (OUTPATIENT)
Dept: PHYSICAL THERAPY | Facility: CLINIC | Age: 70
End: 2020-10-09
Payer: COMMERCIAL

## 2020-10-09 DIAGNOSIS — M17.0 BILATERAL PRIMARY OSTEOARTHRITIS OF KNEE: Primary | ICD-10-CM

## 2020-10-09 DIAGNOSIS — M25.50 MULTIPLE JOINT PAIN: ICD-10-CM

## 2020-10-09 PROCEDURE — 97112 NEUROMUSCULAR REEDUCATION: CPT | Performed by: PHYSICAL THERAPIST

## 2020-10-09 PROCEDURE — 97110 THERAPEUTIC EXERCISES: CPT | Performed by: PHYSICAL THERAPIST

## 2020-10-14 ENCOUNTER — OFFICE VISIT (OUTPATIENT)
Dept: PHYSICAL THERAPY | Facility: CLINIC | Age: 70
End: 2020-10-14
Payer: COMMERCIAL

## 2020-10-14 DIAGNOSIS — M25.50 MULTIPLE JOINT PAIN: ICD-10-CM

## 2020-10-14 DIAGNOSIS — M75.41 IMPINGEMENT SYNDROME OF RIGHT SHOULDER: ICD-10-CM

## 2020-10-14 DIAGNOSIS — G89.29 CHRONIC LEFT SHOULDER PAIN: ICD-10-CM

## 2020-10-14 DIAGNOSIS — M17.0 BILATERAL PRIMARY OSTEOARTHRITIS OF KNEE: Primary | ICD-10-CM

## 2020-10-14 DIAGNOSIS — M75.42 IMPINGEMENT SYNDROME OF LEFT SHOULDER: ICD-10-CM

## 2020-10-14 DIAGNOSIS — M25.512 CHRONIC LEFT SHOULDER PAIN: ICD-10-CM

## 2020-10-14 PROCEDURE — 97110 THERAPEUTIC EXERCISES: CPT

## 2020-10-14 PROCEDURE — 97112 NEUROMUSCULAR REEDUCATION: CPT

## 2020-10-16 ENCOUNTER — OFFICE VISIT (OUTPATIENT)
Dept: PHYSICAL THERAPY | Facility: CLINIC | Age: 70
End: 2020-10-16
Payer: COMMERCIAL

## 2020-10-16 DIAGNOSIS — M17.0 BILATERAL PRIMARY OSTEOARTHRITIS OF KNEE: Primary | ICD-10-CM

## 2020-10-16 DIAGNOSIS — M25.50 MULTIPLE JOINT PAIN: ICD-10-CM

## 2020-10-16 DIAGNOSIS — G89.29 CHRONIC LEFT SHOULDER PAIN: ICD-10-CM

## 2020-10-16 DIAGNOSIS — M75.42 IMPINGEMENT SYNDROME OF LEFT SHOULDER: ICD-10-CM

## 2020-10-16 DIAGNOSIS — M75.41 IMPINGEMENT SYNDROME OF RIGHT SHOULDER: ICD-10-CM

## 2020-10-16 DIAGNOSIS — M25.512 CHRONIC LEFT SHOULDER PAIN: ICD-10-CM

## 2020-10-16 PROCEDURE — 97112 NEUROMUSCULAR REEDUCATION: CPT

## 2020-10-16 PROCEDURE — 97110 THERAPEUTIC EXERCISES: CPT

## 2020-10-21 ENCOUNTER — OFFICE VISIT (OUTPATIENT)
Dept: PHYSICAL THERAPY | Facility: CLINIC | Age: 70
End: 2020-10-21
Payer: COMMERCIAL

## 2020-10-21 DIAGNOSIS — M17.0 BILATERAL PRIMARY OSTEOARTHRITIS OF KNEE: Primary | ICD-10-CM

## 2020-10-21 DIAGNOSIS — G89.29 CHRONIC LEFT SHOULDER PAIN: ICD-10-CM

## 2020-10-21 DIAGNOSIS — M75.41 IMPINGEMENT SYNDROME OF RIGHT SHOULDER: ICD-10-CM

## 2020-10-21 DIAGNOSIS — M25.512 CHRONIC LEFT SHOULDER PAIN: ICD-10-CM

## 2020-10-21 DIAGNOSIS — M25.50 MULTIPLE JOINT PAIN: ICD-10-CM

## 2020-10-21 PROCEDURE — 97110 THERAPEUTIC EXERCISES: CPT | Performed by: PHYSICAL THERAPIST

## 2020-10-21 PROCEDURE — 97112 NEUROMUSCULAR REEDUCATION: CPT | Performed by: PHYSICAL THERAPIST

## 2020-10-23 ENCOUNTER — OFFICE VISIT (OUTPATIENT)
Dept: OBGYN CLINIC | Facility: CLINIC | Age: 70
End: 2020-10-23
Payer: COMMERCIAL

## 2020-10-23 ENCOUNTER — OFFICE VISIT (OUTPATIENT)
Dept: PHYSICAL THERAPY | Facility: CLINIC | Age: 70
End: 2020-10-23
Payer: COMMERCIAL

## 2020-10-23 VITALS
WEIGHT: 262 LBS | SYSTOLIC BLOOD PRESSURE: 166 MMHG | DIASTOLIC BLOOD PRESSURE: 98 MMHG | HEIGHT: 63 IN | TEMPERATURE: 96.5 F | HEART RATE: 60 BPM | BODY MASS INDEX: 46.42 KG/M2

## 2020-10-23 DIAGNOSIS — M75.41 IMPINGEMENT SYNDROME OF RIGHT SHOULDER: ICD-10-CM

## 2020-10-23 DIAGNOSIS — M75.42 IMPINGEMENT SYNDROME OF LEFT SHOULDER: ICD-10-CM

## 2020-10-23 DIAGNOSIS — G89.29 CHRONIC LEFT SHOULDER PAIN: ICD-10-CM

## 2020-10-23 DIAGNOSIS — M25.512 CHRONIC LEFT SHOULDER PAIN: ICD-10-CM

## 2020-10-23 DIAGNOSIS — M25.50 MULTIPLE JOINT PAIN: ICD-10-CM

## 2020-10-23 DIAGNOSIS — M17.0 BILATERAL PRIMARY OSTEOARTHRITIS OF KNEE: Primary | ICD-10-CM

## 2020-10-23 DIAGNOSIS — M17.0 PRIMARY OSTEOARTHRITIS OF BOTH KNEES: Primary | ICD-10-CM

## 2020-10-23 PROCEDURE — 97112 NEUROMUSCULAR REEDUCATION: CPT | Performed by: PHYSICAL THERAPIST

## 2020-10-23 PROCEDURE — 99213 OFFICE O/P EST LOW 20 MIN: CPT | Performed by: FAMILY MEDICINE

## 2020-10-23 PROCEDURE — 97110 THERAPEUTIC EXERCISES: CPT | Performed by: PHYSICAL THERAPIST

## 2020-10-28 ENCOUNTER — EVALUATION (OUTPATIENT)
Dept: PHYSICAL THERAPY | Facility: CLINIC | Age: 70
End: 2020-10-28
Payer: COMMERCIAL

## 2020-10-28 DIAGNOSIS — G89.29 CHRONIC LEFT SHOULDER PAIN: Primary | ICD-10-CM

## 2020-10-28 DIAGNOSIS — M25.512 CHRONIC LEFT SHOULDER PAIN: Primary | ICD-10-CM

## 2020-10-28 PROCEDURE — 97110 THERAPEUTIC EXERCISES: CPT | Performed by: PHYSICAL THERAPIST

## 2020-10-28 PROCEDURE — 97164 PT RE-EVAL EST PLAN CARE: CPT | Performed by: PHYSICAL THERAPIST

## 2020-10-30 ENCOUNTER — OFFICE VISIT (OUTPATIENT)
Dept: PHYSICAL THERAPY | Facility: CLINIC | Age: 70
End: 2020-10-30
Payer: COMMERCIAL

## 2020-10-30 DIAGNOSIS — G89.29 CHRONIC LEFT SHOULDER PAIN: Primary | ICD-10-CM

## 2020-10-30 DIAGNOSIS — M25.512 CHRONIC LEFT SHOULDER PAIN: Primary | ICD-10-CM

## 2020-10-30 DIAGNOSIS — M25.50 MULTIPLE JOINT PAIN: ICD-10-CM

## 2020-10-30 DIAGNOSIS — M75.41 IMPINGEMENT SYNDROME OF RIGHT SHOULDER: ICD-10-CM

## 2020-10-30 DIAGNOSIS — M75.42 IMPINGEMENT SYNDROME OF LEFT SHOULDER: ICD-10-CM

## 2020-10-30 PROCEDURE — 97140 MANUAL THERAPY 1/> REGIONS: CPT

## 2020-10-30 PROCEDURE — 97110 THERAPEUTIC EXERCISES: CPT

## 2020-10-30 PROCEDURE — 97112 NEUROMUSCULAR REEDUCATION: CPT

## 2020-11-04 ENCOUNTER — OFFICE VISIT (OUTPATIENT)
Dept: PHYSICAL THERAPY | Facility: CLINIC | Age: 70
End: 2020-11-04
Payer: COMMERCIAL

## 2020-11-04 DIAGNOSIS — G89.29 CHRONIC LEFT SHOULDER PAIN: Primary | ICD-10-CM

## 2020-11-04 DIAGNOSIS — M25.512 CHRONIC LEFT SHOULDER PAIN: Primary | ICD-10-CM

## 2020-11-04 DIAGNOSIS — M25.50 MULTIPLE JOINT PAIN: ICD-10-CM

## 2020-11-04 DIAGNOSIS — M75.41 IMPINGEMENT SYNDROME OF RIGHT SHOULDER: ICD-10-CM

## 2020-11-04 PROCEDURE — 97110 THERAPEUTIC EXERCISES: CPT | Performed by: PHYSICAL THERAPIST

## 2020-11-04 PROCEDURE — 97140 MANUAL THERAPY 1/> REGIONS: CPT | Performed by: PHYSICAL THERAPIST

## 2020-11-04 PROCEDURE — 97112 NEUROMUSCULAR REEDUCATION: CPT | Performed by: PHYSICAL THERAPIST

## 2020-11-06 ENCOUNTER — OFFICE VISIT (OUTPATIENT)
Dept: PHYSICAL THERAPY | Facility: CLINIC | Age: 70
End: 2020-11-06
Payer: COMMERCIAL

## 2020-11-06 DIAGNOSIS — G89.29 CHRONIC LEFT SHOULDER PAIN: Primary | ICD-10-CM

## 2020-11-06 DIAGNOSIS — M25.512 CHRONIC LEFT SHOULDER PAIN: Primary | ICD-10-CM

## 2020-11-06 DIAGNOSIS — M75.41 IMPINGEMENT SYNDROME OF RIGHT SHOULDER: ICD-10-CM

## 2020-11-06 DIAGNOSIS — M75.42 IMPINGEMENT SYNDROME OF LEFT SHOULDER: ICD-10-CM

## 2020-11-06 DIAGNOSIS — M25.50 MULTIPLE JOINT PAIN: ICD-10-CM

## 2020-11-06 PROCEDURE — 97112 NEUROMUSCULAR REEDUCATION: CPT | Performed by: PHYSICAL THERAPIST

## 2020-11-06 PROCEDURE — 97110 THERAPEUTIC EXERCISES: CPT | Performed by: PHYSICAL THERAPIST

## 2020-11-06 PROCEDURE — 97012 MECHANICAL TRACTION THERAPY: CPT | Performed by: PHYSICAL THERAPIST

## 2020-11-11 ENCOUNTER — OFFICE VISIT (OUTPATIENT)
Dept: PHYSICAL THERAPY | Facility: CLINIC | Age: 70
End: 2020-11-11
Payer: COMMERCIAL

## 2020-11-11 DIAGNOSIS — M25.512 CHRONIC LEFT SHOULDER PAIN: Primary | ICD-10-CM

## 2020-11-11 DIAGNOSIS — M75.41 IMPINGEMENT SYNDROME OF RIGHT SHOULDER: ICD-10-CM

## 2020-11-11 DIAGNOSIS — M75.42 IMPINGEMENT SYNDROME OF LEFT SHOULDER: ICD-10-CM

## 2020-11-11 DIAGNOSIS — G89.29 CHRONIC LEFT SHOULDER PAIN: Primary | ICD-10-CM

## 2020-11-11 DIAGNOSIS — M25.50 MULTIPLE JOINT PAIN: ICD-10-CM

## 2020-11-11 DIAGNOSIS — M17.0 BILATERAL PRIMARY OSTEOARTHRITIS OF KNEE: ICD-10-CM

## 2020-11-11 PROCEDURE — 97112 NEUROMUSCULAR REEDUCATION: CPT

## 2020-11-11 PROCEDURE — 97110 THERAPEUTIC EXERCISES: CPT

## 2020-11-11 PROCEDURE — 97012 MECHANICAL TRACTION THERAPY: CPT

## 2020-11-13 ENCOUNTER — OFFICE VISIT (OUTPATIENT)
Dept: PHYSICAL THERAPY | Facility: CLINIC | Age: 70
End: 2020-11-13
Payer: COMMERCIAL

## 2020-11-13 DIAGNOSIS — G89.29 CHRONIC LEFT SHOULDER PAIN: Primary | ICD-10-CM

## 2020-11-13 DIAGNOSIS — M25.512 CHRONIC LEFT SHOULDER PAIN: Primary | ICD-10-CM

## 2020-11-13 DIAGNOSIS — M75.41 IMPINGEMENT SYNDROME OF RIGHT SHOULDER: ICD-10-CM

## 2020-11-13 DIAGNOSIS — M75.42 IMPINGEMENT SYNDROME OF LEFT SHOULDER: ICD-10-CM

## 2020-11-13 DIAGNOSIS — M25.50 MULTIPLE JOINT PAIN: ICD-10-CM

## 2020-11-13 PROCEDURE — 97112 NEUROMUSCULAR REEDUCATION: CPT | Performed by: PHYSICAL THERAPIST

## 2020-11-13 PROCEDURE — 97012 MECHANICAL TRACTION THERAPY: CPT | Performed by: PHYSICAL THERAPIST

## 2020-11-13 PROCEDURE — 97110 THERAPEUTIC EXERCISES: CPT | Performed by: PHYSICAL THERAPIST

## 2020-11-18 ENCOUNTER — OFFICE VISIT (OUTPATIENT)
Dept: PHYSICAL THERAPY | Facility: CLINIC | Age: 70
End: 2020-11-18
Payer: COMMERCIAL

## 2020-11-18 DIAGNOSIS — M25.512 CHRONIC LEFT SHOULDER PAIN: Primary | ICD-10-CM

## 2020-11-18 DIAGNOSIS — G89.29 CHRONIC LEFT SHOULDER PAIN: Primary | ICD-10-CM

## 2020-11-18 DIAGNOSIS — M75.42 IMPINGEMENT SYNDROME OF LEFT SHOULDER: ICD-10-CM

## 2020-11-18 DIAGNOSIS — M75.41 IMPINGEMENT SYNDROME OF RIGHT SHOULDER: ICD-10-CM

## 2020-11-18 PROCEDURE — 97110 THERAPEUTIC EXERCISES: CPT | Performed by: PHYSICAL THERAPIST

## 2020-11-18 PROCEDURE — 97112 NEUROMUSCULAR REEDUCATION: CPT | Performed by: PHYSICAL THERAPIST

## 2020-11-18 PROCEDURE — 97012 MECHANICAL TRACTION THERAPY: CPT | Performed by: PHYSICAL THERAPIST

## 2020-11-20 ENCOUNTER — OFFICE VISIT (OUTPATIENT)
Dept: PHYSICAL THERAPY | Facility: CLINIC | Age: 70
End: 2020-11-20
Payer: COMMERCIAL

## 2020-11-20 DIAGNOSIS — M75.42 IMPINGEMENT SYNDROME OF LEFT SHOULDER: ICD-10-CM

## 2020-11-20 DIAGNOSIS — M25.50 MULTIPLE JOINT PAIN: ICD-10-CM

## 2020-11-20 DIAGNOSIS — M75.41 IMPINGEMENT SYNDROME OF RIGHT SHOULDER: ICD-10-CM

## 2020-11-20 DIAGNOSIS — M17.0 BILATERAL PRIMARY OSTEOARTHRITIS OF KNEE: ICD-10-CM

## 2020-11-20 DIAGNOSIS — M25.512 CHRONIC LEFT SHOULDER PAIN: Primary | ICD-10-CM

## 2020-11-20 DIAGNOSIS — G89.29 CHRONIC LEFT SHOULDER PAIN: Primary | ICD-10-CM

## 2020-11-20 PROCEDURE — 97110 THERAPEUTIC EXERCISES: CPT | Performed by: PHYSICAL THERAPIST

## 2020-11-20 PROCEDURE — 97112 NEUROMUSCULAR REEDUCATION: CPT | Performed by: PHYSICAL THERAPIST

## 2020-11-20 PROCEDURE — 97012 MECHANICAL TRACTION THERAPY: CPT | Performed by: PHYSICAL THERAPIST

## 2020-11-25 ENCOUNTER — OFFICE VISIT (OUTPATIENT)
Dept: PHYSICAL THERAPY | Facility: CLINIC | Age: 70
End: 2020-11-25
Payer: COMMERCIAL

## 2020-11-25 DIAGNOSIS — M17.0 BILATERAL PRIMARY OSTEOARTHRITIS OF KNEE: ICD-10-CM

## 2020-11-25 DIAGNOSIS — M25.512 CHRONIC LEFT SHOULDER PAIN: Primary | ICD-10-CM

## 2020-11-25 DIAGNOSIS — M75.41 IMPINGEMENT SYNDROME OF RIGHT SHOULDER: ICD-10-CM

## 2020-11-25 DIAGNOSIS — G89.29 CHRONIC LEFT SHOULDER PAIN: Primary | ICD-10-CM

## 2020-11-25 DIAGNOSIS — M75.42 IMPINGEMENT SYNDROME OF LEFT SHOULDER: ICD-10-CM

## 2020-11-25 DIAGNOSIS — M25.50 MULTIPLE JOINT PAIN: ICD-10-CM

## 2020-11-25 PROCEDURE — 97012 MECHANICAL TRACTION THERAPY: CPT

## 2020-11-25 PROCEDURE — 97110 THERAPEUTIC EXERCISES: CPT

## 2020-11-25 PROCEDURE — 97112 NEUROMUSCULAR REEDUCATION: CPT

## 2020-11-27 ENCOUNTER — OFFICE VISIT (OUTPATIENT)
Dept: PHYSICAL THERAPY | Facility: CLINIC | Age: 70
End: 2020-11-27
Payer: COMMERCIAL

## 2020-11-27 DIAGNOSIS — G89.29 CHRONIC LEFT SHOULDER PAIN: Primary | ICD-10-CM

## 2020-11-27 DIAGNOSIS — M75.41 IMPINGEMENT SYNDROME OF RIGHT SHOULDER: ICD-10-CM

## 2020-11-27 DIAGNOSIS — M25.50 MULTIPLE JOINT PAIN: ICD-10-CM

## 2020-11-27 DIAGNOSIS — M75.42 IMPINGEMENT SYNDROME OF LEFT SHOULDER: ICD-10-CM

## 2020-11-27 DIAGNOSIS — M25.512 CHRONIC LEFT SHOULDER PAIN: Primary | ICD-10-CM

## 2020-11-27 PROCEDURE — 97112 NEUROMUSCULAR REEDUCATION: CPT | Performed by: PHYSICAL THERAPIST

## 2020-11-27 PROCEDURE — 97012 MECHANICAL TRACTION THERAPY: CPT | Performed by: PHYSICAL THERAPIST

## 2020-11-27 PROCEDURE — 97110 THERAPEUTIC EXERCISES: CPT | Performed by: PHYSICAL THERAPIST

## 2020-12-01 ENCOUNTER — OFFICE VISIT (OUTPATIENT)
Dept: RHEUMATOLOGY | Facility: CLINIC | Age: 70
End: 2020-12-01
Payer: COMMERCIAL

## 2020-12-01 VITALS — SYSTOLIC BLOOD PRESSURE: 142 MMHG | DIASTOLIC BLOOD PRESSURE: 86 MMHG | HEART RATE: 73 BPM

## 2020-12-01 DIAGNOSIS — R76.8 CYCLIC CITRULLINATED PEPTIDE (CCP) ANTIBODY POSITIVE: ICD-10-CM

## 2020-12-01 DIAGNOSIS — E66.01 CLASS 3 SEVERE OBESITY WITHOUT SERIOUS COMORBIDITY WITH BODY MASS INDEX (BMI) OF 45.0 TO 49.9 IN ADULT, UNSPECIFIED OBESITY TYPE (HCC): ICD-10-CM

## 2020-12-01 DIAGNOSIS — E55.9 VITAMIN D INSUFFICIENCY: ICD-10-CM

## 2020-12-01 DIAGNOSIS — R79.82 ELEVATED C-REACTIVE PROTEIN (CRP): ICD-10-CM

## 2020-12-01 DIAGNOSIS — M19.011 PRIMARY OSTEOARTHRITIS OF SHOULDERS, BILATERAL: ICD-10-CM

## 2020-12-01 DIAGNOSIS — M17.0 BILATERAL PRIMARY OSTEOARTHRITIS OF KNEE: ICD-10-CM

## 2020-12-01 DIAGNOSIS — M15.0 PRIMARY GENERALIZED (OSTEO)ARTHRITIS: ICD-10-CM

## 2020-12-01 DIAGNOSIS — M79.7 FIBROMYALGIA: Primary | ICD-10-CM

## 2020-12-01 DIAGNOSIS — M19.012 PRIMARY OSTEOARTHRITIS OF SHOULDERS, BILATERAL: ICD-10-CM

## 2020-12-01 PROCEDURE — 99213 OFFICE O/P EST LOW 20 MIN: CPT | Performed by: INTERNAL MEDICINE

## 2021-03-10 DIAGNOSIS — Z23 ENCOUNTER FOR IMMUNIZATION: ICD-10-CM

## 2021-03-25 ENCOUNTER — IMMUNIZATIONS (OUTPATIENT)
Dept: FAMILY MEDICINE CLINIC | Facility: HOSPITAL | Age: 71
End: 2021-03-25

## 2021-03-25 DIAGNOSIS — Z23 ENCOUNTER FOR IMMUNIZATION: Primary | ICD-10-CM

## 2021-03-25 PROCEDURE — 91300 SARS-COV-2 / COVID-19 MRNA VACCINE (PFIZER-BIONTECH) 30 MCG: CPT

## 2021-03-25 PROCEDURE — 0001A SARS-COV-2 / COVID-19 MRNA VACCINE (PFIZER-BIONTECH) 30 MCG: CPT

## 2021-04-16 ENCOUNTER — IMMUNIZATIONS (OUTPATIENT)
Dept: FAMILY MEDICINE CLINIC | Facility: HOSPITAL | Age: 71
End: 2021-04-16

## 2021-04-16 DIAGNOSIS — Z23 ENCOUNTER FOR IMMUNIZATION: Primary | ICD-10-CM

## 2021-04-16 PROCEDURE — 0002A SARS-COV-2 / COVID-19 MRNA VACCINE (PFIZER-BIONTECH) 30 MCG: CPT

## 2021-04-16 PROCEDURE — 91300 SARS-COV-2 / COVID-19 MRNA VACCINE (PFIZER-BIONTECH) 30 MCG: CPT

## 2021-07-22 DIAGNOSIS — I10 ESSENTIAL HYPERTENSION: ICD-10-CM

## 2021-07-23 RX ORDER — CARVEDILOL 12.5 MG/1
TABLET ORAL
Qty: 180 TABLET | Refills: 3 | Status: SHIPPED | OUTPATIENT
Start: 2021-07-23

## 2021-10-19 ENCOUNTER — IMMUNIZATIONS (OUTPATIENT)
Dept: FAMILY MEDICINE CLINIC | Facility: HOSPITAL | Age: 71
End: 2021-10-19

## 2021-10-19 DIAGNOSIS — Z23 ENCOUNTER FOR IMMUNIZATION: Primary | ICD-10-CM

## 2021-10-19 PROCEDURE — 91300 SARS-COV-2 / COVID-19 MRNA VACCINE (PFIZER-BIONTECH) 30 MCG: CPT

## 2021-10-19 PROCEDURE — 0001A SARS-COV-2 / COVID-19 MRNA VACCINE (PFIZER-BIONTECH) 30 MCG: CPT

## 2022-01-11 ENCOUNTER — TELEPHONE (OUTPATIENT)
Dept: CARDIOLOGY CLINIC | Facility: CLINIC | Age: 72
End: 2022-01-11

## 2022-01-11 NOTE — TELEPHONE ENCOUNTER
Spoke to patient to schedule routine follow up appt with Dr Kristyn Hidalgo  Patient states he will call back to schedule appt

## 2022-03-25 ENCOUNTER — OFFICE VISIT (OUTPATIENT)
Dept: FAMILY MEDICINE CLINIC | Facility: CLINIC | Age: 72
End: 2022-03-25
Payer: COMMERCIAL

## 2022-03-25 VITALS
SYSTOLIC BLOOD PRESSURE: 128 MMHG | TEMPERATURE: 97.4 F | DIASTOLIC BLOOD PRESSURE: 84 MMHG | OXYGEN SATURATION: 98 % | HEART RATE: 78 BPM | BODY MASS INDEX: 46.78 KG/M2 | HEIGHT: 63 IN | WEIGHT: 264 LBS

## 2022-03-25 DIAGNOSIS — E66.01 CLASS 3 SEVERE OBESITY WITHOUT SERIOUS COMORBIDITY WITH BODY MASS INDEX (BMI) OF 45.0 TO 49.9 IN ADULT, UNSPECIFIED OBESITY TYPE (HCC): ICD-10-CM

## 2022-03-25 DIAGNOSIS — Z12.31 ENCOUNTER FOR SCREENING MAMMOGRAM FOR BREAST CANCER: ICD-10-CM

## 2022-03-25 DIAGNOSIS — M79.7 FIBROMYALGIA: ICD-10-CM

## 2022-03-25 DIAGNOSIS — Z78.0 ASYMPTOMATIC POSTMENOPAUSAL STATE: ICD-10-CM

## 2022-03-25 DIAGNOSIS — R21 RASH AND NONSPECIFIC SKIN ERUPTION: ICD-10-CM

## 2022-03-25 DIAGNOSIS — Z91.09 ENVIRONMENTAL ALLERGIES: ICD-10-CM

## 2022-03-25 DIAGNOSIS — K21.9 GASTROESOPHAGEAL REFLUX DISEASE WITHOUT ESOPHAGITIS: ICD-10-CM

## 2022-03-25 DIAGNOSIS — Z00.00 ENCOUNTER FOR MEDICARE ANNUAL WELLNESS EXAM: ICD-10-CM

## 2022-03-25 DIAGNOSIS — Z76.89 ENCOUNTER TO ESTABLISH CARE WITH NEW DOCTOR: Primary | ICD-10-CM

## 2022-03-25 DIAGNOSIS — K44.9 HIATAL HERNIA: ICD-10-CM

## 2022-03-25 DIAGNOSIS — I10 PRIMARY HYPERTENSION: ICD-10-CM

## 2022-03-25 PROCEDURE — 1125F AMNT PAIN NOTED PAIN PRSNT: CPT | Performed by: FAMILY MEDICINE

## 2022-03-25 PROCEDURE — 3725F SCREEN DEPRESSION PERFORMED: CPT | Performed by: FAMILY MEDICINE

## 2022-03-25 PROCEDURE — 99204 OFFICE O/P NEW MOD 45 MIN: CPT | Performed by: FAMILY MEDICINE

## 2022-03-25 PROCEDURE — 3008F BODY MASS INDEX DOCD: CPT | Performed by: FAMILY MEDICINE

## 2022-03-25 PROCEDURE — G0438 PPPS, INITIAL VISIT: HCPCS | Performed by: FAMILY MEDICINE

## 2022-03-25 PROCEDURE — 1036F TOBACCO NON-USER: CPT | Performed by: FAMILY MEDICINE

## 2022-03-25 PROCEDURE — 1160F RVW MEDS BY RX/DR IN RCRD: CPT | Performed by: FAMILY MEDICINE

## 2022-03-25 PROCEDURE — 3288F FALL RISK ASSESSMENT DOCD: CPT | Performed by: FAMILY MEDICINE

## 2022-03-25 PROCEDURE — 1170F FXNL STATUS ASSESSED: CPT | Performed by: FAMILY MEDICINE

## 2022-03-25 RX ORDER — TRIAMCINOLONE ACETONIDE 5 MG/G
OINTMENT TOPICAL 2 TIMES DAILY
Qty: 30 G | Refills: 0 | Status: SHIPPED | OUTPATIENT
Start: 2022-03-25

## 2022-03-25 RX ORDER — CETIRIZINE HYDROCHLORIDE 10 MG/1
10 TABLET ORAL DAILY
Qty: 90 TABLET | Refills: 0 | Status: SHIPPED | OUTPATIENT
Start: 2022-03-25

## 2022-03-25 RX ORDER — BEPOTASTINE BESILATE 15 MG/ML
1 SOLUTION/ DROPS OPHTHALMIC DAILY
COMMUNITY

## 2022-03-25 RX ORDER — LIFITEGRAST 50 MG/ML
1 SOLUTION/ DROPS OPHTHALMIC DAILY
COMMUNITY
Start: 2022-03-06 | End: 2022-06-08 | Stop reason: ALTCHOICE

## 2022-03-25 NOTE — PROGRESS NOTES
Assessment/Plan:    No problem-specific Assessment & Plan notes found for this encounter  Diagnoses and all orders for this visit:    Encounter to establish care with new doctor    Encounter for Medicare annual wellness exam    Encounter for screening mammogram for breast cancer  -     Mammo screening bilateral w cad; Future    Asymptomatic postmenopausal state  -     DXA bone density spine hip and pelvis; Future    Class 3 severe obesity without serious comorbidity with body mass index (BMI) of 45 0 to 49 9 in adult, unspecified obesity type ([facility] [address] )    Gastroesophageal reflux disease without esophagitis  -     Ambulatory Referral to Gastroenterology; Future    Primary hypertension  -     Lipid Panel with Direct LDL reflex; Future  -     Comprehensive metabolic panel; Future  -     CBC and differential; Future    Fibromyalgia    Environmental allergies  -     cetirizine (ZyrTEC) 10 mg tablet; Take 1 tablet (10 mg total) by mouth daily    Hiatal hernia  -     Ambulatory Referral to Gastroenterology; Future    Rash and nonspecific skin eruption  -     Ambulatory Referral to Dermatology; Future  -     triamcinolone (KENALOG) 0 5 % ointment; Apply topically 2 (two) times a day    Other orders  -     Xiidra 5 % op solution; Administer 1 drop to both eyes daily  -     bepotastine besilate (BEPREVE) 1 5 % op soln; Administer 1 drop to both eyes daily          BMI Counseling: Body mass index is 46 77 kg/m²  The BMI is above normal  Nutrition recommendations include decreasing portion sizes, encouraging healthy choices of fruits and vegetables, consuming healthier snacks, limiting drinks that contain sugar, moderation in carbohydrate intake, increasing intake of lean protein and reducing intake of cholesterol  Exercise recommendations include moderate physical activity 150 minutes/week and exercising 3-5 times per week  No pharmacotherapy was ordered   Rationale for BMI follow-up plan is due to patient being overweight or obese  Depression Screening and Follow-up Plan: Patient was screened for depression during today's encounter  They screened negative with a PHQ-2 score of 0  Subjective:      Patient ID: Tahir Melchor is a 73 y o  female  HPI     Patient presents to the office to establish care  Notes that she has been compliant with her medications  She has not seen a doctor in a few years  She is due for routine follow up with cardiology  Notes that she has osteoarthritis in her knees  Tylenol seems to help with this  She has fibromyalgia, taking OTC supplement that is helpful  Not following with rheumatology regularly  The following portions of the patient's history were reviewed and updated as appropriate: She  has a past medical history of GERD (gastroesophageal reflux disease) and Obesity  She   Patient Active Problem List    Diagnosis Date Noted    Chronic left shoulder pain 2020    Chest pain 2020    Obesity, morbid ([address] ) 2020    Hypertension 2020    GERD (gastroesophageal reflux disease) 2019    Colon cancer screening 2019     She  has a past surgical history that includes  section; Ovarian cyst removal; Esophagogastroduodenoscopy; and Cardiac catheterization (2020)  Her family history includes Arthritis in her mother; Heart disease in her father; Hypertension in her mother  She  reports that she has never smoked  She has never used smokeless tobacco  She reports current alcohol use  She reports that she does not use drugs    Current Outpatient Medications   Medication Sig Dispense Refill    acetaminophen (TYLENOL) 325 mg tablet Take 2 tablets (650 mg total) by mouth every 4 (four) hours as needed for mild pain or headaches 30 tablet 0    Ascorbic Acid (vitamin C) 1000 MG tablet Take 1,000 mg by mouth daily      atorvastatin (LIPITOR) 40 mg tablet Take 1 tablet (40 mg total) by mouth every evening 90 tablet 3    b complex vitamins capsule Take 1 capsule by mouth daily      bepotastine besilate (BEPREVE) 1 5 % op soln Administer 1 drop to both eyes daily      carvedilol (COREG) 12 5 mg tablet TAKE 1 TABLET TWICE A DAY WITH MEALS 180 tablet 3    Cholecalciferol (Vitamin D-3) 25 MCG (1000 UT) CAPS Take 1,000 Units by mouth      Coenzyme Q10 (CoQ10) 100 MG CAPS Take by mouth      esomeprazole (NexIUM) 20 mg capsule Take 20 mg by mouth 2 (two) times a day      Krill Oil Omega-3 500 MG CAPS Take by mouth      Multiple Vitamin (Multi Vitamin Daily) TABS Take by mouth      Plant Sterols and Stanols (CHOLESTOFF PO) Take 1 tablet by mouth daily      Turmeric 500 MG CAPS Take 500 mg by mouth      aspirin 81 mg chewable tablet Chew 1 tablet (81 mg total) daily 30 tablet 0    calcium carbonate (OS-SUSIE) 600 MG tablet Take 600 mg by mouth 2 (two) times a day with meals      cetirizine (ZyrTEC) 10 mg tablet Take 1 tablet (10 mg total) by mouth daily 90 tablet 0    cycloSPORINE (RESTASIS) 0 05 % ophthalmic emulsion 1 drop      nitroglycerin (NITROSTAT) 0 4 mg SL tablet Place 1 tablet (0 4 mg total) under the tongue every 5 (five) minutes as needed for chest pain 30 tablet 0    triamcinolone (KENALOG) 0 5 % ointment Apply topically 2 (two) times a day 30 g 0    Xiidra 5 % op solution Administer 1 drop to both eyes daily       No current facility-administered medications for this visit  She is allergic to penicillins and latex       Review of Systems   Constitutional: Negative for activity change, appetite change, chills, fatigue and fever  HENT: Negative for congestion, ear pain, rhinorrhea and sore throat  Eyes: Negative for pain  Respiratory: Negative for cough and shortness of breath  Cardiovascular: Negative for chest pain and leg swelling  Gastrointestinal: Negative for abdominal pain, constipation, diarrhea, nausea and vomiting  Genitourinary: Negative for dysuria, frequency and urgency     Musculoskeletal: Positive for arthralgias and myalgias  Negative for gait problem  Skin: Positive for rash  Neurological: Negative for dizziness, light-headedness and headaches  Objective:  /84 (BP Location: Left arm, Patient Position: Sitting, Cuff Size: Large)   Pulse 78   Temp (!) 97 4 °F (36 3 °C)   Ht 5' 3" (1 6 m)   Wt 120 kg (264 lb)   SpO2 98%   BMI 46 77 kg/m²      Physical Exam  Vitals reviewed  Constitutional:       General: She is not in acute distress  Appearance: Normal appearance  She is obese  HENT:      Head: Normocephalic and atraumatic  Right Ear: External ear normal       Left Ear: External ear normal       Nose: Nose normal       Mouth/Throat:      Mouth: Mucous membranes are moist    Eyes:      Extraocular Movements: Extraocular movements intact  Conjunctiva/sclera: Conjunctivae normal       Pupils: Pupils are equal, round, and reactive to light  Cardiovascular:      Rate and Rhythm: Normal rate and regular rhythm  Heart sounds: Normal heart sounds  Pulmonary:      Effort: Pulmonary effort is normal       Breath sounds: Normal breath sounds  No wheezing, rhonchi or rales  Abdominal:      General: Bowel sounds are normal  There is no distension  Palpations: Abdomen is soft  Tenderness: There is no abdominal tenderness  Musculoskeletal:      Cervical back: Neck supple  Right lower leg: Edema (trace) present  Left lower leg: Edema (trace) present  Lymphadenopathy:      Cervical: No cervical adenopathy  Skin:     General: Skin is warm  Capillary Refill: Capillary refill takes less than 2 seconds  Findings: Rash present  Neurological:      Mental Status: She is alert  Mental status is at baseline           Servando Rosario DO  [facility]  3/25/2022 3:03 PM

## 2022-03-25 NOTE — PROGRESS NOTES
Assessment and Plan:     Problem List Items Addressed This Visit        Digestive    GERD (gastroesophageal reflux disease)       Cardiovascular and Mediastinum    Hypertension      Other Visit Diagnoses     Encounter to establish care with new doctor    -  Primary    Encounter for Medicare annual wellness exam        Encounter for screening mammogram for breast cancer        Relevant Orders    Mammo screening bilateral w cad    Asymptomatic postmenopausal state        Relevant Orders    DXA bone density spine hip and pelvis    Class 3 severe obesity without serious comorbidity with body mass index (BMI) of 45 0 to 49 9 in adult, unspecified obesity type ([address] )        Fibromyalgia            BMI Counseling: Body mass index is 46 77 kg/m²  The BMI is above normal  Nutrition recommendations include decreasing portion sizes, encouraging healthy choices of fruits and vegetables, consuming healthier snacks, limiting drinks that contain sugar, moderation in carbohydrate intake, increasing intake of lean protein and reducing intake of cholesterol  Exercise recommendations include moderate physical activity 150 minutes/week and exercising 3-5 times per week  No pharmacotherapy was ordered  Rationale for BMI follow-up plan is due to patient being overweight or obese  Depression Screening and Follow-up Plan: Patient was screened for depression during today's encounter  They screened negative with a PHQ-2 score of 0  Preventive health issues were discussed with patient, and age appropriate screening tests were ordered as noted in patient's After Visit Summary  Personalized health advice and appropriate referrals for health education or preventive services given if needed, as noted in patient's After Visit Summary       History of Present Illness:     Patient presents for Medicare Annual Wellness visit    Patient Care Team:  Bambi Díaz DO as PCP - General (Family Medicine)  Hawa Akhtar DO as PCP - PCP-[facility] ([facility])     Problem List:     Patient Active Problem List   Diagnosis    GERD (gastroesophageal reflux disease)    Colon cancer screening    Chest pain    Obesity, morbid ([address] )    Hypertension    Chronic left shoulder pain      Past Medical and Surgical History:     Past Medical History:   Diagnosis Date    GERD (gastroesophageal reflux disease)     Obesity      Past Surgical History:   Procedure Laterality Date    CARDIAC CATHETERIZATION  2020     SECTION      ESOPHAGOGASTRODUODENOSCOPY      OVARIAN CYST REMOVAL        Family History:     Family History   Problem Relation Age of Onset    Hypertension Mother     Arthritis Mother     Heart disease Father       Social History:     Social History     Socioeconomic History    Marital status: /Civil Union     Spouse name: None    Number of children: None    Years of education: None    Highest education level: None   Occupational History    None   Tobacco Use    Smoking status: Never Smoker    Smokeless tobacco: Never Used   Vaping Use    Vaping Use: Never used   Substance and Sexual Activity    Alcohol use:  Yes    Drug use: Never    Sexual activity: Not Currently   Other Topics Concern    None   Social History Narrative    None     Social Determinants of Health     Financial Resource Strain: Not on file   Food Insecurity: Not on file   Transportation Needs: Not on file   Physical Activity: Not on file   Stress: Not on file   Social Connections: Not on file   Intimate Partner Violence: Not on file   Housing Stability: Not on file      Medications and Allergies:     Current Outpatient Medications   Medication Sig Dispense Refill    acetaminophen (TYLENOL) 325 mg tablet Take 2 tablets (650 mg total) by mouth every 4 (four) hours as needed for mild pain or headaches 30 tablet 0    Ascorbic Acid (vitamin C) 1000 MG tablet Take 1,000 mg by mouth daily      atorvastatin (LIPITOR) 40 mg tablet Take 1 tablet (40 mg total) by mouth every evening 90 tablet 3    b complex vitamins capsule Take 1 capsule by mouth daily      bepotastine besilate (BEPREVE) 1 5 % op soln Administer 1 drop to both eyes daily      carvedilol (COREG) 12 5 mg tablet TAKE 1 TABLET TWICE A DAY WITH MEALS 180 tablet 3    Cholecalciferol (Vitamin D-3) 25 MCG (1000 UT) CAPS Take 1,000 Units by mouth      Coenzyme Q10 (CoQ10) 100 MG CAPS Take by mouth      esomeprazole (NexIUM) 20 mg capsule Take 20 mg by mouth 2 (two) times a day      Krill Oil Omega-3 500 MG CAPS Take by mouth      Multiple Vitamin (Multi Vitamin Daily) TABS Take by mouth      Plant Sterols and Stanols (CHOLESTOFF PO) Take 1 tablet by mouth daily      Turmeric 500 MG CAPS Take 500 mg by mouth      aspirin 81 mg chewable tablet Chew 1 tablet (81 mg total) daily 30 tablet 0    calcium carbonate (OS-SUSIE) 600 MG tablet Take 600 mg by mouth 2 (two) times a day with meals      cycloSPORINE (RESTASIS) 0 05 % ophthalmic emulsion 1 drop      lisinopril (ZESTRIL) 10 mg tablet Take 1 tablet (10 mg total) by mouth daily 90 tablet 3    nitroglycerin (NITROSTAT) 0 4 mg SL tablet Place 1 tablet (0 4 mg total) under the tongue every 5 (five) minutes as needed for chest pain 30 tablet 0    Xiidra 5 % op solution Administer 1 drop to both eyes daily       No current facility-administered medications for this visit       Allergies   Allergen Reactions    Penicillins      dizziness    Latex Rash      Immunizations:     Immunization History   Administered Date(s) Administered    COVID-19 PFIZER VACCINE 0 3 ML IM 03/25/2021, 04/16/2021, 10/19/2021    Influenza Split High Dose Preservative Free IM 10/04/2019    Influenza, high dose seasonal 0 7 mL 09/03/2020    Pneumococcal Polysaccharide PPV23 10/04/2019    Zoster Vaccine Recombinant 09/03/2020      Health Maintenance:         Topic Date Due    Breast Cancer Screening: Mammogram  Never done    Colorectal Cancer Screening  Never done    Hepatitis C Screening  Completed         Topic Date Due    DTaP,Tdap,and Td Vaccines (1 - Tdap) Never done    Influenza Vaccine (1) 09/01/2021      Medicare Health Risk Assessment:     /84 (BP Location: Left arm, Patient Position: Sitting, Cuff Size: Large)   Pulse 78   Temp (!) 97 4 °F (36 3 °C)   Ht 5' 3" (1 6 m)   Wt 120 kg (264 lb)   SpO2 98%   BMI 46 77 kg/m²      Iris is here for her Initial Wellness visit  Health Risk Assessment:   Patient rates overall health as good  Patient feels that their physical health rating is slightly worse  Patient is satisfied with their life  Eyesight was rated as same  Hearing was rated as same  Patient feels that their emotional and mental health rating is same  Patients states they are never, rarely angry  Patient states they are never, rarely unusually tired/fatigued  Pain experienced in the last 7 days has been some  Patient's pain rating has been 9/10  Patient states that she has experienced no weight loss or gain in last 6 months  Depression Screening:   PHQ-2 Score: 0      Fall Risk Screening: In the past year, patient has experienced: no history of falling in past year      Urinary Incontinence Screening:   Patient has not leaked urine accidently in the last six months  Home Safety:  Patient does not have trouble with stairs inside or outside of their home  Patient has working smoke alarms and has working carbon monoxide detector  Home safety hazards include: none  Nutrition:   Current diet is Regular  Medications:   Patient is currently taking over-the-counter supplements  OTC medications include: see medication list  Patient is able to manage medications  Activities of Daily Living (ADLs)/Instrumental Activities of Daily Living (IADLs):   Walk and transfer into and out of bed and chair?: Yes  Dress and groom yourself?: Yes    Bathe or shower yourself?: Yes    Feed yourself?  Yes  Do your laundry/housekeeping?: Yes  Manage your money, pay your bills and track your expenses?: Yes  Make your own meals?: Yes    Do your own shopping?: No    Previous Hospitalizations:   Any hospitalizations or ED visits within the last 12 months?: No      Advance Care Planning:   Living will: Yes    Durable POA for healthcare: Yes    Advanced directive: Yes      Cognitive Screening:   Provider or family/friend/caregiver concerned regarding cognition?: No    PREVENTIVE SCREENINGS      Cardiovascular Screening:    General: Screening Current      Diabetes Screening:     General: Risks and Benefits Discussed      Colorectal Cancer Screening:     General: Screening Current      Breast Cancer Screening:     General: Risks and Benefits Discussed    Due for: Mammogram        Cervical Cancer Screening:    General: Screening Not Indicated      Osteoporosis Screening:    General: Risks and Benefits Discussed    Due for: DXA Axial      Abdominal Aortic Aneurysm (AAA) Screening:        General: Screening Not Indicated      Lung Cancer Screening:     General: Screening Not Indicated      Hepatitis C Screening:    General: Screening Current    Screening, Brief Intervention, and Referral to Treatment (SBIRT)    Screening  Typical number of drinks in a day: 0  Typical number of drinks in a week: 0  Interpretation: Low risk drinking behavior      AUDIT-C Screenin) How often did you have a drink containing alcohol in the past year? never  2) How many drinks did you have on a typical day when you were drinking in the past year? 0  3) How often did you have 6 or more drinks on one occasion in the past year? never    AUDIT-C Score: 0  Interpretation: Score 0-2 (female): Negative screen for alcohol misuse    Single Item Drug Screening:  How often have you used an illegal drug (including marijuana) or a prescription medication for non-medical reasons in the past year? never    Single Item Drug Screen Score: 0  Interpretation: Negative screen for possible drug use disorder    Other Counseling Topics:   Car/seat belt/driving safety and calcium and vitamin D intake         Lezlie Barthel, DO

## 2022-03-25 NOTE — PATIENT INSTRUCTIONS
Medicare Preventive Visit Patient Instructions  Thank you for completing your Welcome to Medicare Visit or Medicare Annual Wellness Visit today  Your next wellness visit will be due in one year (3/26/2023)  The screening/preventive services that you may require over the next 5-10 years are detailed below  Some tests may not apply to you based off risk factors and/or age  Screening tests ordered at today's visit but not completed yet may show as past due  Also, please note that scanned in results may not display below  Preventive Screenings:  Service Recommendations Previous Testing/Comments   Colorectal Cancer Screening  * Colonoscopy    * Fecal Occult Blood Test (FOBT)/Fecal Immunochemical Test (FIT)  * Fecal DNA/Cologuard Test  * Flexible Sigmoidoscopy Age: 54-65 years old   Colonoscopy: every 10 years (may be performed more frequently if at higher risk)  OR  FOBT/FIT: every 1 year  OR  Cologuard: every 3 years  OR  Sigmoidoscopy: every 5 years  Screening may be recommended earlier than age 48 if at higher risk for colorectal cancer  Also, an individualized decision between you and your healthcare provider will decide whether screening between the ages of 74-80 would be appropriate  Colonoscopy: Not on file  FOBT/FIT: Not on file  Cologuard: Not on file  Sigmoidoscopy: Not on file          Breast Cancer Screening Age: 36 years old  Frequency: every 1-2 years  Not required if history of left and right mastectomy Mammogram: Not on file        Cervical Cancer Screening Between the ages of 21-29, pap smear recommended once every 3 years  Between the ages of 33-67, can perform pap smear with HPV co-testing every 5 years     Recommendations may differ for women with a history of total hysterectomy, cervical cancer, or abnormal pap smears in past  Pap Smear: Not on file    Screening Not Indicated   Hepatitis C Screening Once for adults born between [facility]  More frequently in patients at high risk for Hepatitis C Hep C Antibody: Not on file    Screening Current   Diabetes Screening 1-2 times per year if you're at risk for diabetes or have pre-diabetes Fasting glucose: 83 mg/dL   A1C: 5 3 %        Cholesterol Screening Once every 5 years if you don't have a lipid disorder  May order more often based on risk factors  Lipid panel: 07/27/2020    Screening Current     Other Preventive Screenings Covered by Medicare:  1  Abdominal Aortic Aneurysm (AAA) Screening: covered once if your at risk  You're considered to be at risk if you have a family history of AAA  2  Lung Cancer Screening: covers low dose CT scan once per year if you meet all of the following conditions: (1) Age 50-69; (2) No signs or symptoms of lung cancer; (3) Current smoker or have quit smoking within the last 15 years; (4) You have a tobacco smoking history of at least 30 pack years (packs per day multiplied by number of years you smoked); (5) You get a written order from a healthcare provider  3  Glaucoma Screening: covered annually if you're considered high risk: (1) You have diabetes OR (2) Family history of glaucoma OR (3)  aged 48 and older OR (3)  American aged 72 and older  3  Osteoporosis Screening: covered every 2 years if you meet one of the following conditions: (1) You're estrogen deficient and at risk for osteoporosis based off medical history and other findings; (2) Have a vertebral abnormality; (3) On glucocorticoid therapy for more than 3 months; (4) Have primary hyperparathyroidism; (5) On osteoporosis medications and need to assess response to drug therapy  · Last bone density test (DXA Scan): Not on file  5  HIV Screening: covered annually if you're between the age of 12-76  Also covered annually if you are younger than 13 and older than 72 with risk factors for HIV infection  For pregnant patients, it is covered up to 3 times per pregnancy      Immunizations:  Immunization Recommendations   Influenza Vaccine Annual influenza vaccination during flu season is recommended for all persons aged >= 6 months who do not have contraindications   Pneumococcal Vaccine (Prevnar and Pneumovax)  * Prevnar = PCV13  * Pneumovax = PPSV23   Adults 25-60 years old: 1-3 doses may be recommended based on certain risk factors  Adults 72 years old: Prevnar (PCV13) vaccine recommended followed by Pneumovax (PPSV23) vaccine  If already received PPSV23 since turning 65, then PCV13 recommended at least one year after PPSV23 dose  Hepatitis B Vaccine 3 dose series if at intermediate or high risk (ex: diabetes, end stage renal disease, liver disease)   Tetanus (Td) Vaccine - COST NOT COVERED BY MEDICARE PART B Following completion of primary series, a booster dose should be given every 10 years to maintain immunity against tetanus  Td may also be given as tetanus wound prophylaxis  Tdap Vaccine - COST NOT COVERED BY MEDICARE PART B Recommended at least once for all adults  For pregnant patients, recommended with each pregnancy  Shingles Vaccine (Shingrix) - COST NOT COVERED BY MEDICARE PART B  2 shot series recommended in those aged 48 and above     Health Maintenance Due:      Topic Date Due    Breast Cancer Screening: Mammogram  Never done    Colorectal Cancer Screening  Never done    Hepatitis C Screening  Completed     Immunizations Due:      Topic Date Due    DTaP,Tdap,and Td Vaccines (1 - Tdap) Never done    Influenza Vaccine (1) 09/01/2021     Advance Directives   What are advance directives? Advance directives are legal documents that state your wishes and plans for medical care  These plans are made ahead of time in case you lose your ability to make decisions for yourself  Advance directives can apply to any medical decision, such as the treatments you want, and if you want to donate organs  What are the types of advance directives?   There are many types of advance directives, and each state has rules about how to use them  You may choose a combination of any of the following:  · Living will: This is a written record of the treatment you want  You can also choose which treatments you do not want, which to limit, and which to stop at a certain time  This includes surgery, medicine, IV fluid, and tube feedings  · Durable power of  for healthcare [facility]): This is a written record that states who you want to make healthcare choices for you when you are unable to make them for yourself  This person, called a proxy, is usually a family member or a friend  You may choose more than 1 proxy  · Do not resuscitate (DNR) order:  A DNR order is used in case your heart stops beating or you stop breathing  It is a request not to have certain forms of treatment, such as CPR  A DNR order may be included in other types of advance directives  · Medical directive: This covers the care that you want if you are in a coma, near death, or unable to make decisions for yourself  You can list the treatments you want for each condition  Treatment may include pain medicine, surgery, blood transfusions, dialysis, IV or tube feedings, and a ventilator (breathing machine)  · Values history: This document has questions about your views, beliefs, and how you feel and think about life  This information can help others choose the care that you would choose  Why are advance directives important? An advance directive helps you control your care  Although spoken wishes may be used, it is better to have your wishes written down  Spoken wishes can be misunderstood, or not followed  Treatments may be given even if you do not want them  An advance directive may make it easier for your family to make difficult choices about your care  Weight Management   Why it is important to manage your weight:  Being overweight increases your risk of health conditions such as heart disease, high blood pressure, type 2 diabetes, and certain types of cancer   It can also increase your risk for osteoarthritis, sleep apnea, and other respiratory problems  Aim for a slow, steady weight loss  Even a small amount of weight loss can lower your risk of health problems  How to lose weight safely:  A safe and healthy way to lose weight is to eat fewer calories and get regular exercise  You can lose up about 1 pound a week by decreasing the number of calories you eat by 500 calories each day  Healthy meal plan for weight management:  A healthy meal plan includes a variety of foods, contains fewer calories, and helps you stay healthy  A healthy meal plan includes the following:  · Eat whole-grain foods more often  A healthy meal plan should contain fiber  Fiber is the part of grains, fruits, and vegetables that is not broken down by your body  Whole-grain foods are healthy and provide extra fiber in your diet  Some examples of whole-grain foods are whole-wheat breads and pastas, oatmeal, brown rice, and bulgur  · Eat a variety of vegetables every day  Include dark, leafy greens such as spinach, kale, anupam greens, and mustard greens  Eat yellow and orange vegetables such as carrots, sweet potatoes, and winter squash  · Eat a variety of fruits every day  Choose fresh or canned fruit (canned in its own juice or light syrup) instead of juice  Fruit juice has very little or no fiber  · Eat low-fat dairy foods  Drink fat-free (skim) milk or 1% milk  Eat fat-free yogurt and low-fat cottage cheese  Try low-fat cheeses such as mozzarella and other reduced-fat cheeses  · Choose meat and other protein foods that are low in fat  Choose beans or other legumes such as split peas or lentils  Choose fish, skinless poultry (chicken or turkey), or lean cuts of red meat (beef or pork)  Before you cook meat or poultry, cut off any visible fat  · Use less fat and oil  Try baking foods instead of frying them   Add less fat, such as margarine, sour cream, regular salad dressing and mayonnaise to foods  Eat fewer high-fat foods  Some examples of high-fat foods include french fries, doughnuts, ice cream, and cakes  · Eat fewer sweets  Limit foods and drinks that are high in sugar  This includes candy, cookies, regular soda, and sweetened drinks  Exercise:  Exercise at least 30 minutes per day on most days of the week  Some examples of exercise include walking, biking, dancing, and swimming  You can also fit in more physical activity by taking the stairs instead of the elevator or parking farther away from stores  Ask your healthcare provider about the best exercise plan for you  © Copyright [employer] 2018 Information is for End User's use only and may not be sold, redistributed or otherwise used for commercial purposes   All illustrations and images included in CareNotes® are the copyrighted property of A D A M [employer]  or [address] [place] [address]

## 2022-03-28 ENCOUNTER — TELEPHONE (OUTPATIENT)
Dept: ADMINISTRATIVE | Facility: OTHER | Age: 72
End: 2022-03-28

## 2022-03-28 NOTE — TELEPHONE ENCOUNTER
Upon review of the In Basket request we were unable to locate a cologuard in chart search  Any additional questions or concerns should be emailed to the Practice Liaisons via [email or web address]  org email, please do not reply via In Basket      Thank you  Monica Sotelo

## 2022-03-28 NOTE — TELEPHONE ENCOUNTER
----- Message from Shannon Osborn sent at 3/25/2022  2:36 PM EDT -----  Regarding: Care gap request  03/25/22 2:36 PM    Hello, our patient Talia Matthews has had CRC: Cologuard completed/performed  Please assist in updating the patient chart by pulling the document from the Media Tab  The date of service is 12/2019       Thank you,  Shannon Osborn  [facility] [address]

## 2022-03-31 ENCOUNTER — HOSPITAL ENCOUNTER (OUTPATIENT)
Dept: MAMMOGRAPHY | Facility: CLINIC | Age: 72
Discharge: HOME | End: 2022-03-31
Payer: COMMERCIAL

## 2022-03-31 ENCOUNTER — HOSPITAL ENCOUNTER (OUTPATIENT)
Dept: BONE DENSITY | Facility: CLINIC | Age: 72
Discharge: HOME | End: 2022-03-31
Payer: COMMERCIAL

## 2022-03-31 ENCOUNTER — APPOINTMENT (OUTPATIENT)
Dept: LAB | Facility: CLINIC | Age: 72
End: 2022-03-31
Payer: COMMERCIAL

## 2022-03-31 VITALS — BODY MASS INDEX: 45.71 KG/M2 | WEIGHT: 258 LBS | HEIGHT: 63 IN

## 2022-03-31 DIAGNOSIS — I10 PRIMARY HYPERTENSION: ICD-10-CM

## 2022-03-31 DIAGNOSIS — Z12.31 ENCOUNTER FOR SCREENING MAMMOGRAM FOR BREAST CANCER: ICD-10-CM

## 2022-03-31 DIAGNOSIS — Z78.0 ASYMPTOMATIC POSTMENOPAUSAL STATE: ICD-10-CM

## 2022-03-31 LAB
ALBUMIN SERPL BCP-MCNC: 3.3 G/DL (ref 3.5–5)
ALP SERPL-CCNC: 435 U/L (ref 46–116)
ALT SERPL W P-5'-P-CCNC: 97 U/L (ref 12–78)
ANION GAP SERPL CALCULATED.3IONS-SCNC: 4 MMOL/L (ref 4–13)
AST SERPL W P-5'-P-CCNC: 59 U/L (ref 5–45)
BASOPHILS # BLD AUTO: 0.08 THOUSANDS/ΜL (ref 0–0.1)
BASOPHILS NFR BLD AUTO: 1 % (ref 0–1)
BILIRUB SERPL-MCNC: 0.55 MG/DL (ref 0.2–1)
BUN SERPL-MCNC: 16 MG/DL (ref 5–25)
CALCIUM ALBUM COR SERPL-MCNC: 10.3 MG/DL (ref 8.3–10.1)
CALCIUM SERPL-MCNC: 9.7 MG/DL (ref 8.3–10.1)
CHLORIDE SERPL-SCNC: 111 MMOL/L (ref 100–108)
CHOLEST SERPL-MCNC: 233 MG/DL
CO2 SERPL-SCNC: 28 MMOL/L (ref 21–32)
CREAT SERPL-MCNC: 0.88 MG/DL (ref 0.6–1.3)
EOSINOPHIL # BLD AUTO: 0.59 THOUSAND/ΜL (ref 0–0.61)
EOSINOPHIL NFR BLD AUTO: 8 % (ref 0–6)
ERYTHROCYTE [DISTWIDTH] IN BLOOD BY AUTOMATED COUNT: 16 % (ref 11.6–15.1)
GFR SERPL CREATININE-BSD FRML MDRD: 67 ML/MIN/1.73SQ M
GLUCOSE P FAST SERPL-MCNC: 96 MG/DL (ref 65–99)
HCT VFR BLD AUTO: 43 % (ref 34.8–46.1)
HDLC SERPL-MCNC: 74 MG/DL
HGB BLD-MCNC: 13.7 G/DL (ref 11.5–15.4)
IMM GRANULOCYTES # BLD AUTO: 0.03 THOUSAND/UL (ref 0–0.2)
IMM GRANULOCYTES NFR BLD AUTO: 0 % (ref 0–2)
LDLC SERPL CALC-MCNC: 147 MG/DL (ref 0–100)
LYMPHOCYTES # BLD AUTO: 1.27 THOUSANDS/ΜL (ref 0.6–4.47)
LYMPHOCYTES NFR BLD AUTO: 17 % (ref 14–44)
MCH RBC QN AUTO: 29.6 PG (ref 26.8–34.3)
MCHC RBC AUTO-ENTMCNC: 31.9 G/DL (ref 31.4–37.4)
MCV RBC AUTO: 93 FL (ref 82–98)
MONOCYTES # BLD AUTO: 0.56 THOUSAND/ΜL (ref 0.17–1.22)
MONOCYTES NFR BLD AUTO: 8 % (ref 4–12)
NEUTROPHILS # BLD AUTO: 4.84 THOUSANDS/ΜL (ref 1.85–7.62)
NEUTS SEG NFR BLD AUTO: 66 % (ref 43–75)
NRBC BLD AUTO-RTO: 0 /100 WBCS
PLATELET # BLD AUTO: 287 THOUSANDS/UL (ref 149–390)
PMV BLD AUTO: 10.2 FL (ref 8.9–12.7)
POTASSIUM SERPL-SCNC: 3.9 MMOL/L (ref 3.5–5.3)
PROT SERPL-MCNC: 7.9 G/DL (ref 6.4–8.2)
RBC # BLD AUTO: 4.63 MILLION/UL (ref 3.81–5.12)
SODIUM SERPL-SCNC: 143 MMOL/L (ref 136–145)
TRIGL SERPL-MCNC: 58 MG/DL
WBC # BLD AUTO: 7.37 THOUSAND/UL (ref 4.31–10.16)

## 2022-03-31 PROCEDURE — 77063 BREAST TOMOSYNTHESIS BI: CPT

## 2022-03-31 PROCEDURE — 77067 SCR MAMMO BI INCL CAD: CPT

## 2022-03-31 PROCEDURE — 85025 COMPLETE CBC W/AUTO DIFF WBC: CPT

## 2022-03-31 PROCEDURE — 80053 COMPREHEN METABOLIC PANEL: CPT

## 2022-03-31 PROCEDURE — 77080 DXA BONE DENSITY AXIAL: CPT

## 2022-03-31 PROCEDURE — 80061 LIPID PANEL: CPT

## 2022-03-31 PROCEDURE — 36415 COLL VENOUS BLD VENIPUNCTURE: CPT

## 2022-04-04 ENCOUNTER — VBI (OUTPATIENT)
Dept: ADMINISTRATIVE | Facility: OTHER | Age: 72
End: 2022-04-04

## 2022-04-04 ENCOUNTER — OFFICE VISIT (OUTPATIENT)
Dept: DERMATOLOGY | Facility: CLINIC | Age: 72
End: 2022-04-04
Payer: COMMERCIAL

## 2022-04-04 VITALS — HEIGHT: 63 IN | WEIGHT: 258 LBS | BODY MASS INDEX: 45.71 KG/M2 | TEMPERATURE: 97 F

## 2022-04-04 DIAGNOSIS — B00.9 HERPES SIMPLEX: Primary | ICD-10-CM

## 2022-04-04 PROCEDURE — 1036F TOBACCO NON-USER: CPT | Performed by: DERMATOLOGY

## 2022-04-04 PROCEDURE — 1160F RVW MEDS BY RX/DR IN RCRD: CPT | Performed by: DERMATOLOGY

## 2022-04-04 PROCEDURE — 99203 OFFICE O/P NEW LOW 30 MIN: CPT | Performed by: DERMATOLOGY

## 2022-04-04 RX ORDER — VALACYCLOVIR HYDROCHLORIDE 500 MG/1
500 TABLET, FILM COATED ORAL 2 TIMES DAILY
Qty: 12 TABLET | Refills: 3 | Status: SHIPPED | OUTPATIENT
Start: 2022-04-04 | End: 2022-04-07

## 2022-04-04 NOTE — PROGRESS NOTES
[facility] Note   Patient Name: Kavin Le   Encounter Date: 04/04/2022   Have you been cared for by a [facility] in the last 3 years and, if so, which one? No  Have you traveled outside of the [facility] in the past 3 months or outside of the [facility] area in the last 2 weeks? No  May we call your Preferred Phone number to discuss your specific medical information? Yes   May we leave a detailed message that includes your specific medical information? Yes   Today's Chief Concerns:   Concern #1: Rash   Concern #2:   Past Medical History:   Have you personally ever had or currently have any of the following? Skin cancer (such as Melanoma, Basal Cell Carcinoma, Squamous Cell Carcinoma? (If Yes, please provide more detail)- No  Eczema: No   Psoriasis: No   HIV/AIDS: No  Hepatitis B or C: No   Tuberculosis: No   Systemic Immunosuppression such as Diabetes, Biologic or Immunotherapy, Chemotherapy, Organ Transplantation, Bone Marrow Transplantation (If YES, please provide more detail): No   Radiation Treatment (If YES, please provide more detail): No  Any other major medical conditions/concerns? (If Yes, which types)- No  Family History:  Have any of your "first degree relatives" (parent, brother, sister, or child) had any of the following    Skin cancer such as Melanoma or Merkel Cell Carcinoma or Pancreatic Cancer? No   Eczema, Asthma, Hay Fever or Seasonal Allergies: YES, seasonal allergies   Psoriasis or Psoriatic Arthritis: No   Do any other medical conditions seem to run in your family? If Yes, what condition and which relatives?  No  Current Medications:   (please update all dermatological medications before printing patient's AVS!)       Current Outpatient Medications:    acetaminophen (TYLENOL) 325 mg tablet, Take 2 tablets (650 mg total) by mouth every 4 (four) hours as needed for mild pain or headaches, Disp: 30 tablet, Rfl: 0    Ascorbic Acid (vitamin C) 1000 MG tablet, Take 1,000 mg by mouth daily, Disp: , Rfl:    atorvastatin (LIPITOR) 40 mg tablet, Take 1 tablet (40 mg total) by mouth every evening, Disp: 90 tablet, Rfl: 3    b complex vitamins capsule, Take 1 capsule by mouth daily, Disp: , Rfl:    calcium carbonate (OS-SUSIE) 600 MG tablet, Take 600 mg by mouth 2 (two) times a day with meals, Disp: , Rfl:    carvedilol (COREG) 12 5 mg tablet, TAKE 1 TABLET TWICE A DAY WITH MEALS, Disp: 180 tablet, Rfl: 3    cetirizine (ZyrTEC) 10 mg tablet, Take 1 tablet (10 mg total) by mouth daily, Disp: 90 tablet, Rfl: 0    Cholecalciferol (Vitamin D-3) 25 MCG (1000 UT) CAPS, Take 1,000 Units by mouth, Disp: , Rfl:    Coenzyme Q10 (CoQ10) 100 MG CAPS, Take by mouth, Disp: , Rfl:    esomeprazole (NexIUM) 20 mg capsule, Take 20 mg by mouth 2 (two) times a day, Disp: , Rfl:    Krill Oil Omega-3 500 MG CAPS, Take by mouth, Disp: , Rfl:    Multiple Vitamin (Multi Vitamin Daily) TABS, Take by mouth, Disp: , Rfl:    Plant Sterols and Stanols (CHOLESTOFF PO), Take 1 tablet by mouth daily, Disp: , Rfl:    triamcinolone (KENALOG) 0 5 % ointment, Apply topically 2 (two) times a day, Disp: 30 g, Rfl: 0    Turmeric 500 MG CAPS, Take 500 mg by mouth, Disp: , Rfl:    Xiidra 5 % op solution, Administer 1 drop to both eyes daily, Disp: , Rfl:    aspirin 81 mg chewable tablet, Chew 1 tablet (81 mg total) daily, Disp: 30 tablet, Rfl: 0    bepotastine besilate (BEPREVE) 1 5 % op soln, Administer 1 drop to both eyes daily (Patient not taking: Reported on 4/4/2022 ), Disp: , Rfl:    cycloSPORINE (RESTASIS) 0 05 % ophthalmic emulsion, 1 drop, Disp: , Rfl:    nitroglycerin (NITROSTAT) 0 4 mg SL tablet, Place 1 tablet (0 4 mg total) under the tongue every 5 (five) minutes as needed for chest pain, Disp: 30 tablet, Rfl: 0   Review of Systems:   Have you recently had or currently have any of the following? If YES, what are you doing for the problem?    Fever, chills or unintended weight loss: No   Sudden loss or change in your vision: No   Nausea, vomiting or blood in your stool: No   Painful or swollen joints: No   Wheezing or cough: No   Changing mole or non-healing wound: No   Nosebleeds: No   Excessive sweating: No   Easy or prolonged bleeding? No   Over the last 2 weeks, how often have you been bothered by the following problems? Taking little interest or pleasure in doing things: 1 - Not at All   Feeling down, depressed, or hopeless: 1 - Not at All  Rapid heartbeat with epinephrine: No  FEMALES ONLY:   Are you pregnant or planning to become pregnant? No   Are you currently or planning to be nursing or breast feeding? No  Any known allergies? Allergies   Allergen Reactions    Penicillins      dizziness    Latex Rash     Physical Exam:   Was a chaperone (Derm Clinical Assistant) present throughout the entire Physical Exam? Yes  Did the Dermatology Team specifically  the patient on the importance of a Full Skin Exam to be sure that nothing is missed clinically?  NO}   Did the patient ultimately request or accept a Full Skin Exam? NO   Did the patient specifically refuse to have the areas "under-the-bra" examined by the Dermatologist? YES   Did the patient specifically refuse to have the areas "under-the-underwear" examined by the Dermatologist? YES  CONSTITUTIONAL:   Vitals                                PSYCH: Normal mood and affect   EYES: Normal conjunctiva   ENT: Normal lips and oral mucosa   CARDIOVASCULAR: No edema   RESPIRATORY: Normal respirations   HEME/LYMPH/IMMUNO: No regional lymphadenopathy except as noted below in "ASSESSMENT AND PLAN BY DIAGNOSIS"     MELANOCYTIC NEVI ("Moles")   Physical Exam:   Anatomic Location Affected: Mostly on sun-exposed areas of the trunk and extremities   Morphological Description: Scattered, 1-4mm round to ovoid, symmetrical-appearing, even bordered, skin colored to dark brown macules/papules, mostly in sun-exposed areas   Pertinent Positives:   Pertinent Negatives: Additional History of Present Condition:   Assessment and Plan:   Based on a thorough discussion of this condition and the management approach to it (including a comprehensive discussion of the known risks, side effects and potential benefits of treatment), the patient (family) agrees to implement the following specific plan:   When outside we recommend using a wide brim hat, sunglasses, long sleeve and pants, sunscreen with SPF 27+ with reapplication every 2 hours, or SPF specific clothing   Benign, reassured   Annual skin check  Melanocytic Nevi   Melanocytic nevi ("moles") are tan or brown, raised or flat areas of the skin which have an increased number of melanocytes  Melanocytes are the cells in our body which make pigment and account for skin color  Some moles are present at birth (I e , "congenital nevi"), while others come up later in life (i e , "acquired nevi")  The sun can stimulate the body to make more moles  Sunburns are not the only thing that triggers more moles  Chronic sun exposure can do it too  Clinically distinguishing a healthy mole from melanoma may be difficult, even for experienced dermatologists  The "ABCDE's" of moles have been suggested as a means of helping to alert a person to a suspicious mole and the possible increased risk of melanoma  The suggestions for raising alert are as follows:   Asymmetry: Healthy moles tend to be symmetric, while melanomas are often asymmetric  Asymmetry means if you draw a line through the mole, the two halves do not match in color, size, shape, or surface texture  Asymmetry can be a result of rapid enlargement of a mole, the development of a raised area on a previously flat lesion, scaling, ulceration, bleeding or scabbing within the mole   Any mole that starts to demonstrate "asymmetry" should be examined promptly by a board certified dermatologist    Border: Healthy moles tend to have discrete, even borders  The border of a melanoma often blends into the normal skin and does not sharply delineate the mole from normal skin  Any mole that starts to demonstrate "uneven borders" should be examined promptly by a board certified dermatologist    Color: Healthy moles tend to be one color throughout  Melanomas tend to be made up of different colors ranging from dark black, blue, white, or red  Any mole that demonstrates a color change should be examined promptly by a board certified dermatologist    Diameter: Healthy moles tend to be smaller than 0 6 cm in size; an exception are "congenital nevi" that can be larger  Melanomas tend to grow and can often be greater than 0 6 cm (1/4 of an inch, or the size of a pencil eraser)  This is only a guideline, and many normal moles may be larger than 0 6 cm without being unhealthy  Any mole that starts to change in size (small to bigger or bigger to smaller) should be examined promptly by a board certified dermatologist    Evolving: Healthy moles tend to "stay the same " Melanomas may often show signs of change or evolution such as a change in size, shape, color, or elevation  Any mole that starts to itch, bleed, crust, burn, hurt, or ulcerate or demonstrate a change or evolution should be examined promptly by a board certified dermatologist    Virginia Brasher ("DRY SKIN")   Physical Exam:   Anatomic Location Affected: diffuse   Morphological Description: xerosis   Pertinent Positives:   Pertinent Negatives: Additional History of Present Condition:   Assessment and Plan:   Based on a thorough discussion of this condition and the management approach to it (including a comprehensive discussion of the known risks, side effects and potential benefits of treatment), the patient (family) agrees to implement the following specific plan:   Use moisturizer like Eucerin,Cerave or Aveeno Cream 3 times a day for the dry skin     Dry skin refers to skin that feels dry to touch   Dry skin has a dull surface with a rough, scaly quality  The skin is less pliable and cracked  When dryness is severe, the skin may become inflamed and fissured  Although any body site can be dry, dry skin tends to affect the shins more than any other site  Dry skin is lacking moisture in the outer horny cell layer (stratum corneum) and this results in cracks in the skin surface  Dry skin is also called xerosis, xeroderma or asteatosis (lack of fat)  It can affect males and females of all ages  There is some racial variability in water and lipid content of the skin  Dry skin that starts in early childhood may be one of about 20 types of ichthyosis (fish-scale skin)  There is often a family history of dry skin  Dry skin is commonly seen in people with atopic dermatitis  Nearly everyone > 60 years has dry skin  Dry skin that begins later may be seen in people with certain diseases and conditions  Postmenopausal women   Hypothyroidism   Chronic renal disease   Malnutrition and weight loss   Subclinical dermatitis   Treatment with certain drugs such as oral retinoids, diuretics and epidermal growth factor receptor inhibitors  What is the treatment for dry skin? The mainstay of treatment of dry skin and ichthyosis is moisturisers/emollients  They should be applied liberally and often enough to:   Reduce itch   Improve the barrier function   Prevent entry of irritants, bacteria   Reduce transepidermal water loss  How can dry skin be prevented? Eliminate aggravating factors:   Reduce the frequency of bathing  A humidifier in winter and air conditioner in summer   Compare having a short shower with a prolonged soak in a bath  Use lukewarm, not hot, water  Replace standard soap with a substitute such as a synthetic detergent cleanser, water-miscible emollient, bath oil, anti-pruritic tar oil, colloidal oatmeal etc    Apply an emollient liberally and often, particularly shortly after bathing, and when itchy   The drier the skin, the thicker this should be, especially on the hands  What is the outlook for dry skin? A tendency to dry skin may persist life-long, or it may improve once contributing factors are controlled  PRURITUS   Physical Exam:   Anatomic Location Affected: Trunk and ectremities   Morphological Description: Currently clear   Pertinent Positives:   Pertinent Negatives: Additional History of Present Condition: Patient states at times her skin is very itchy, does keep her awake at night  Assessment and Plan:   Based on a thorough discussion of this condition and the management approach to it (including a comprehensive discussion of the known risks, side effects and potential benefits of treatment), the patient (family) agrees to implement the following specific plan:   - Recommend sensitive skin care regimen   - detergent free of dyes and perfumes (example, free and clear)  Try washing clothes with extra rinse cycle  - Short lukewarm showers   - White dove bar soap   - Recommend moisturizing whole body with Creams multiple times a day (examples: Cetaphil, CeraVe  and Eucerin)  - avoid aerosols and fragrances in the home (candles, plug ins, perfume, air freshener, etc)   Start Zyrtec 10 mg take once daily  What is pruritus? Pruritus is the medical term for itch  Itch is an unpleasant sensation on the skin that provokes the desire to rub or scratch the area to obtain relief  Itch can cause discomfort and frustration; in severe cases it can lead to disturbed sleep, anxiety and depression  Constant scratching to obtain relief can damage the skin (excoriation, lichenification) and reduce its effectiveness as a major protective barrier  Pruritus is often a symptom of an underlying disease process such as a skin problem, a systemic disease, or abnormal nerve impulses  What are skin signs of pruritus?    There are no specific skin signs associated with pruritus, apart from scratch marks (excoriations) and signs of the underlying condition  Persistent scratching over a period of time may lead to:   Lichenification (thickened skin, lichen simplex)   Prurigo papules and nodules  Who gets pruritus? The epidemiology of pruritus depends on its underlying cause or causes  However, in general, the incidence of chronic pruritus increases with age, it is more common in women, and in those of  background  Mechanisms underlying pruritus   Itch, like pain, can originate anywhere along the neural itch pathway, from the central nervous system (brain and spinal cord) to the peripheral nervous system and the skin  Mechanisms underlying pruritus are complex  The itch signal is transmitted mainly through small, itch-selective C-fibers in the skin in addition to histamine-triggered and non-histaminergic neurons  These connect with secondary neurons which cross the opposite side of the spinothalamic tract and ascend to parts of the brain involved in sensation, emotion, reward and memory  These areas overlap with those activated by pain  Patients with chronic pruritus usually have both peripheral and central hypersensitization (heightened reaction) which means they tend to overreact to noxious stimuli which normally inhibit itch (such as heat and scratching) and also misinterpret non-noxious stimuli as an itch (eg, light touch)  The way scratching stops itching has been explained by an interaction with pain pathways within the dorsal horn of the spinal cord  Localized pruritus   Localized pruritus is pruritus that is confined to a certain part of the body  It can occur in association with a primary rash (eg dermatitis) or may occur because of hypersensitive nerves in the skin (neuropathic pruritus)  Neuropathic pruritus is due to compression or degeneration of nerves in the skin, on route to the spine or in the spine itself  Neuropathic itch is sometimes associated with reduced or absent sweating in the affected area of skin  Typical causes of localized itchy rashes   Scalp: seborrhoeic dermatitis, head lice   Back: Comstock disease   Hands: pompholyx, irritant and/or allergic contact dermatitis   Genitals: vulvovaginal Candida albicans infection, lichen sclerosus   Legs: venous eczema   Feet: tinea pedis  Neuropathic causes of localized pruritus without primary rash   Face: trigeminal trophic syndrome   Hand: cheiralgia paraesthetica   Arm: brachioradial pruritus   Back: notalgia paraesthetica/topics/brachioradial-pruritus/   Genital: pruritus vulvae, pruritus ani   Dermatomal: herpes zoster (shingles) during recovery phase  Scratching a localised itch may lead to lichen simplex, prurigo or prurigo nodularis  Systemic causes of pruritus   Sytemic diseases may cause generalised pruritus  This is sometimes called metabolic itch  There is nothing wrong with the skin itself, at least until it's been scratched  Metabolic disorders include chronic renal failure (dialysis) and liver disease (with or without cholestasis)  Uraemic pruritus arises in patients undergoing dialysis is due to a combination of xerosis (dry skin), secondary hyperparathyroidism, peripheral neuropathy (nerve changes) and inflammation  Secondary hyperparathyroidism which also occurs in dialysis patients leads to microprecipitation (deposition) of calcium and magnesium salts in the skin, triggering mast cell degeneration, releasing serotonin and histamine  Once chronic pruritus has occurred, there may be secondary changes in the nerves in the skin and central nervous system which heighten the sensation of itch  Hepatogenic pruritus is more common in intrahepatic than extrahepatic cholestasis  Examples of intrahepatic cholestasis is associated with chronic viral hepatitis, primary biliary cirrhosis, pregnancy-related cholestasis  Extra-hepatic cholestasis is associated with pressure on the bile ducts, eg from pancreatic tumours or pseudocysts     Cholestasis is thought to release toxic substances from the liver, which stimulates neural itch fibres in the skin  Characteristically, cholestatic pruritus is most severe at night; it tends to affect the hands, feet and areas where clothes are rubbing on the skin  Haematological disorders include iron deficiency anaemia and polycythaemia vera  Generalized pruritus along with glossitis (tongue inflammation) and angular cheilitis (inflammation of mouth corners) are seen in iron deficiency anaemia  In polycythaemia vera, itch is usually precipitated by contact with water (aquagenic pruritus), eg after a shower  This is thought to be mediated by the effect of platelets, serotonin and prostaglandins  Endocrine disorders include thyroid disease and diabetes mellitus  In Graves' disease (thyrotoxicosis), increased blood flow, skin temperature and decreased itch threshold mediated by the increase in thyroid hormones, lead to the itch  Pruritus associated with myxoedema and hypothyroidism is rare, and if present, is more likely the result of xerosis (dry skin)  In diabetes mellitus, localized itch tends to occur in the perianal/genital region usually due to Candida albicans or dermatophyte infections  It is unclear if metabolic abnormalities such as renal impairment, autonomic failure or diabetic neuropathy contribute to this  Paraneoplastic itch is associated with lymphoma, especially Hodgkin lymphoma, leukemia or a solid organ tumor (eg lung, colon, brain)  In Hodgkin lymphoma, pruritus is thought to be caused by histamine release, which may be related to eosinophilia  Infections causing itch include human immunodeficiency virus infection (HIV) and hepatitis C virus  Patients with HIV commonly complain of itch  This may be associated with skin infections/infestations, dry skin, drug reactions, hyperoesinophilia (increased eosinophil levels) and cutaneous T cell lymphoma   There is a possible correlation between intractable pruritus and increased HIV viral load  In chronic hepatitis C infection, the mechanisms responsible for itch remain unclear  In the absence of cholestasis, pruritus may be related to antiviral therapy; it has been noted to occur in patients treated with combination therapy (interferon manny and ribavirin)  Pruritic skin diseases   Pruritus is often a symptom of many skin diseases  Some of these are included in the following list    Allergic contact dermatitis   Dry skin   Urticaria   Psoriasis   Atopic dermatitis   Folliculitis   Dermatitis herpetiformis   Lichen simplex   Lichen planus   Bullous pemphigoid   Lice   Scabies   Miliaria   Sunburn   Pityriasis rosea   Mycosis fungoides  Exposure-related pruritus   Pruritus may arise as a result of exposure to certain external factors  Allergens or irritants   Cold, which can cause 'winter itch'   A physical urticaria, such as dermographism   Aquagenic pruritus (itch on exposure to water)   Insects and infestations, eg scabies   Medications (topical or systemic) eg opioids, aspirin  Hormonal reasons for pruritus   About 2% of pregnant women have pruritus without any obvious dermatological cause  In some cases the itch is due to cholestasis (pooling of bile in the gall bladder and liver)  It usually occurs in the 3rd trimester and is relieved after giving birth  Generalized itch is also a common symptom of menopause  How is pruritus diagnosed? The first steps of evaluation of an itchy patient are medical history and examination  A thorough history can identify constitutional symptoms that may point towards an underlying systemic disease  Drug triggers such as opioids may be identified, especially if the commencement of the drug relates to the itch  A careful examination can identify dermatological causes for the itch (eg scabies, lichen simplex, pemphigoid) or evidence of chronic skin changes related to the itch   In dermatological causes of pruritus, primary skin lesions will usually suggest the diagnosis  Patients without primary skin lesions and little evidence of chronic scratching should be investigated for systemic, neuropathic and psychogenic causes  The panel of investigations could include:   Full/complete blood count   Creatinine and renal function tests   Liver function tests   Thyroid function tests   Erythrocyte sedimentation rate   Chest radiography   HIV serology  What treatment is available for itch? The management of pruritus relies on establishing the cause and then either removing or treating the cause to prevent further itching  In many cases, tests are necessary to determine the cause; while these are in progress, treatment to provide symptomatic relief of pruritus may be given  Topical treatments   In addition to specific therapy for any underlying skin or internal disease, topical treatment may include:   Wet dressings or tepid shower to cool the skin   Calamine lotion (contains phenol, which cools the skin): avoid on dry skin and limit use to a few days   Menthol/camphor lotion: gives a chilling sensation   Local anesthetics, such as pramoxine (also called pramocaine), applied to small itchy spots such as insect bites   Regular use of emollients, especially if skin is dry   Mild topical corticosteroids for short periods   Topical calcineurin inhibitors are also used to reduce itch associated with inflammatory skin conditions   Topical doxepin, a tricyclic antidepressant and antihistamine, is an antipruritic used in eczema  Other measures that can be useful in preventing pruritus include avoiding precipitating factors such as rough clothing or fabrics, overheating, and vasodilators if they provoke itching (eg, caffeine, alcohol, spices)  Fingernails should be kept short and clean  If the urge to scratch is irresistible then rub the area with your palm   Topical antihistamines should not be used for chronic itch, as they may sensitize the skin and result in allergic contact dermatitis  Systemic therapy   If pruritus is severe and sleep is disturbed, then treatment with oral medication may be necessary  Some drugs may help to relieve the itch whilst others are given solely for their sedative effects  Antihistamines are most useful in urticaria, in which histamine is released  Use for other pruritic conditions is not supported by randomized control trials  Sedating antihistamines may be used for their sedative effects  Doxepin and amitriptyline are tricyclic antidepressants have antipruritic action and act on the central and peripheral nervous systems  Tetracyclic antidepressants such as mirtazepine and selective serotonin reuptake inhibitors (paroxetine, sertraline, fluoxetine) may also help some patients with severe itch including when it is caused by cholestasis, T-cell lymphoma, malignancy or a neuropathic condition  Anti-epileptic drugs such as sodium valproate, gabapentin and pregabalin may also be of benefit to some patients, e g , those with itch associated with renal failure or neuropathic itch  The mechanism of action is uncertain  Opioid antagonists such as butorphanol intranasal spray, naltrexone tablets, and naloxone have been effective in patients suffering from intractable pruritus in association with liver disease, atopic eczema and chronic urticaria  Nalfurafine, which is a kappa-opioid agonist has also been studied and shown to reduce itch associated with chronic renal impairment, however it is not widely available  Aspirin is sometimes effective if pruritus is mediated by kinins or prostaglandins and is noted to be effective in patients with pruritus due to polycythaemia vera  Note: aspirin may cause or aggravate itch in some patients  Thalidomide has been successful in treating nodular prurigo and chronic pruritus of various kinds but is rarely used because of serious adverse effects and expense  Rifampicin is effective for patients with pruritus associated with cholestasis (some forms of liver disease)  Isolated case reports in severe itch associated with malignancy have reported success with the NKR1 antagonist, aprepitant (normally used short-term for postoperative or chemotherapy-induced nausea)  This is under investigation for neuropathic itch and nodular prurigo  Phototherapy   Broadband ultraviolet B or narrow-band UVB phototherapy alone, or in conjunction with UVA, has been shown to be helpful for pruritus associated with chronic kidney disease, psoriasis, atopic eczema and cutaneous T-cell lymphoma  Behavioral therapy   Behavioral therapy may be used in conjunction with pharmacotherapy to modify behaviours such as coping mechanisms and stress reduction, which help interrupt the itch-scratch cycle  One randomized controlled trial showed short-term benefits in reduction in itch frequency and scratching as well as improvement in coping mechanisms  What is the outcome for pruritus? The management of chronic severe itch is difficult and often requires the use of combination therapy over a long period of time  Identification and treatment of underlying conditions causing pruritus may help in this process  The symptom may quickly disappear or persist for long periods of time  Xanthelasma   Physical Exam:   Anatomic Location Affected: Eye lids   Morphological Description: Yellow plaques   Pertinent Positives:   Pertinent Negatives:  Assessment and Plan:   Based on a thorough discussion of this condition and the management approach to it (including a comprehensive discussion of the known risks, side effects and potential benefits of treatment), the patient (family) agrees to implement the following specific plan:   Patient made aware can be caused by cholesterol   Patient is currently taking atorvastatin to help control cholesterol     HERPES SIMPLEX   Physical Exam:   Anatomic Location Affected: Buttocks   Morphological Description: Cluster of papules   Pertinent Positives:   Pertinent Negatives: Additional History of Present Condition: Patient states that she does have an outbreak about every 6 months  State it does itch, blister and then crust, states it is very painful  Assessment and Plan:   Based on a thorough discussion of this condition and the management approach to it (including a comprehensive discussion of the known risks, side effects and potential benefits of treatment), the patient (family) agrees to implement the following specific plan:   Start Valacyclovir 500 mg take 1 tablet twice a day for 3 days as needed for flares  What is herpes simplex? Herpes simplex is a common viral infection that presents with localized blistering  It affects most people on one or more occasions during their lives  Herpes simplex is commonly referred to as cold sores or fever blisters, as recurrences are often triggered by a febrile illness, such as a cold  What causes herpes simplex? Herpes simplex is caused by one of two types of herpes simplex virus (HSV), members of the Herpesvirales family of double-stranded DNA viruses  Type 1 HSV is mainly associated with oral and facial infections   Type 2 HSV is mainly associated with genital and rectal infections (anogenital herpes)   However, either virus can affect almost any area of skin or mucous membrane  After the primary episode of infection, HSV resides in a latent state in spinal dorsal root nerves that supply sensation to the skin  During a recurrence, the virus follows the nerves onto the skin or mucous membranes, where it multiplies, causing the clinical lesion  After each attack and lifelong, it enters the resting state  During an attack, the virus can be inoculated into new sites of skin, which can then develop blisters as well as the original site of infection  Who gets herpes simplex?    Primary attacks of Type 1 HSV infections occur mainly in infants and young children  In crowded, underdeveloped areas of the world, nearly all children have been infected by the age of 11  In less crowded places, the incidence is lower; for example, less than half of university entrants in [facility] have been infected  Type 2 HSV infections occur mainly after puberty and are often transmitted sexually  HSV is transmitted by direct or indirect contact with someone with active herpes simplex, which is infectious for 7-12 days  Asymptomatic shedding of the virus in saliva or genital secretions can also lead to transmission of HSV, but this is infrequent, as the amount shed from inactive lesions is 100 to 1000 times less than when it is active  The incubation period is 2-12 days  Minor injury helps inoculate HSV into the skin  For example:   A thumb sucker may transmit the virus from their mouth to their thumb  A health-care worker may develop herpetic brianne (paronychia)   A rugby player may get a cluster of blisters on one cheek ('scrumpox')  What are the clinical features of herpes simplex? Primary infection with HSV can be mild or subclinical, but symptomatic infection tends to be more severe than recurrences  Type 2 HSV is more often symptomatic than Type 1 HSV  Primary Type 1 HSV most often presents as gingivostomatitis, in children between 3and 11years of age  Symptoms include fever, which may be high, restlessness and excessive dribbling  Drinking and eating are painful, and the breath is foul  The gums are swollen and red and bleed easily  Whitish vesicles evolve to yellowish ulcers on the tongue, throat, palate and inside the cheeks  Local lymph glands are enlarged and tender  The fever subsides after 3-5 days and recovery is usually complete within 2 weeks  Primary Type 2 HSV usually presents as genital herpes after the onset of sexual activity   Painful vesicles, ulcers, redness and swelling last for 2 to 3 weeks, if untreated, and are often accompanied by fever and tender inguinal lymphadenopathy  In males, herpes most often affects the glans, foreskin and shaft of the penis  Anal herpes is more common in males who have sex with men than with heterosexual partners  In females, herpes most often arises on the vulva and in the vagina  It is often painful or difficult to pass urine  Infection of the cervix may progress to severe ulceration  Recurrent herpes simplex   After the initial infection, whether symptomatic or not, there may be no further clinical manifestations throughout life  Where viral immunity is insufficient, recurrent infections are common, particularly with Type 2 genital herpes  Recurrences can be triggered by:   Minor trauma, surgery or procedures to the affected area   Upper respiratory tract infections   Sun exposure   Hormonal factors (in women, flares are not uncommon prior to menstruation)   Emotional stress  In many cases, no reason for the eruption is evident  The vesicles tend to be smaller and more closely grouped in recurrent herpes, compared to primary herpes  They usually return to roughly the same site as the primary infection  Recurrent Type 1 HSV can occur on any site, most frequently the face, particularly the lips (herpes simplex labialis)  Recurrent Type 2 HSV may also occur on any site, but most often affects the genitals or buttocks  Itching or burning is followed an hour or two later by an irregular cluster of small, closely grouped, often umbilicated vesicles on a red base  They normally heal in 7-10 days without scarring  The affected person may feel well or suffer from fever, pain and have enlarged local lymph nodes  Herpetic vesicles are sometimes arranged in a line rather like shingles and are said to have a zosteriform distribution, particularly when affecting the lower chest or lumbar region   White patches or scars may occur at the site of recurrent HSV attacks and are more evident in those with the skin of color  How is herpes simplex diagnosed? If there is clinical doubt, HSV can be confirmed by culture or PCR of a viral swab taken from fresh vesicles  HSV serology is not very informative, as it's positive in most individuals and thus not specific for the lesion with which they present  What are the complications of herpes simplex? Eye infection: Herpes simplex may cause swollen eyelids and conjunctivitis with opacity and superficial ulceration of the cornea (dendritic ulcer, best seen after fluorescein staining of the cornea)  Throat infection: may be very painful and interfere with swallowing  Eczema herpeticum: In patients with a history of atopic dermatitis or Darier disease, HSV may result in severe and widespread infection, known as eczema herpeticum  The skin disease can be active or historical  Numerous blisters erupt on the face or elsewhere, associated with swollen lymph glands and fever  Erythema multiform: A single episode or recurrent erythema multiforme is an uncommon reaction to herpes simplex  The rash of erythema multiforme appears as symmetrical plaques on hands, forearms, feet and lower legs  It is characterised by target lesions, which sometimes have central blisters  Mucosal lesions may be observed  Nervous system: Cranial/facial nerves may be infected by HSV, producing temporary paralysis of the affected muscles  Rarely, neuralgic pain may precede each recurrence of herpes by 1 or 2 days (Chaitanya syndrome)  Meningitis is rare  Widespread infection: Disseminated infection and/or persistent ulceration due to HSV can be serious in debilitated or immune deficient patients, for example in people with human immunodeficiency virus (HIV) infection  What is the treatment for herpes simplex? Mild, uncomplicated eruptions of herpes simplex require no treatment  Blisters may be covered if desired, for example with a hydrocolloid patch   Severe infection may require treatment with an antiviral agent  Antiviral drugs used for herpes simplex and their usual doses are:   Aciclovir - 200 mg 5 times daily for five days   Valaciclovir - 1 g 3 times daily for seven days   Famciclovir - as a single dose of 3 x 500 mg  Higher doses of antiviral drugs are used for eczema herpeticum or for disseminated herpes simplex  Topical aciclovir or penciclovir may shorten attacks of recurrent herpes simplex, provided the cream is started early enough  Can herpes simplex be prevented? As sun exposure often triggers facial herpes simplex, sun protection using high protection factor sunscreens and other measures are important  Antiviral drugs will stop HSV multiplying once it reaches the skin or mucous membranes but cannot eradicate the virus from its resting stage within the nerve cells  They can, therefore, shorten and prevent attacks but a single course cannot prevent future attacks  Repeated courses may be prescribed, or the medication may be taken continuously to prevent frequent attacks              Scribe Attestation    I,:  Deland Kanner, MA am acting as a scribe while in the presence of the attending physician :       I,:  Kristy Chino MD personally performed the services described in this documentation    as scribed in my presence :

## 2022-04-08 NOTE — RESULT ENCOUNTER NOTE
Negative mammogram, follow up with routine screening in 1 year       Mariajose Celestin [facility]  4/8/2022 3:57 PM

## 2022-04-09 DIAGNOSIS — R79.89 ELEVATED LFTS: Primary | ICD-10-CM

## 2022-04-11 ENCOUNTER — APPOINTMENT (OUTPATIENT)
Dept: LAB | Facility: HOSPITAL | Age: 72
End: 2022-04-11
Payer: COMMERCIAL

## 2022-04-11 DIAGNOSIS — R79.89 ELEVATED LFTS: Primary | ICD-10-CM

## 2022-04-11 LAB
FERRITIN SERPL-MCNC: 51 NG/ML (ref 8–388)
IRON SATN MFR SERPL: 24 % (ref 15–50)
IRON SERPL-MCNC: 69 UG/DL (ref 50–170)
TIBC SERPL-MCNC: 282 UG/DL (ref 250–450)

## 2022-04-11 PROCEDURE — 36415 COLL VENOUS BLD VENIPUNCTURE: CPT

## 2022-04-11 PROCEDURE — 86258 DGP ANTIBODY EACH IG CLASS: CPT

## 2022-04-11 PROCEDURE — 86381 MITOCHONDRIAL ANTIBODY EACH: CPT

## 2022-04-11 PROCEDURE — 83540 ASSAY OF IRON: CPT

## 2022-04-11 PROCEDURE — 86364 TISS TRNSGLTMNASE EA IG CLAS: CPT

## 2022-04-11 PROCEDURE — 82784 ASSAY IGA/IGD/IGG/IGM EACH: CPT

## 2022-04-11 PROCEDURE — 80074 ACUTE HEPATITIS PANEL: CPT

## 2022-04-11 PROCEDURE — 86015 ACTIN ANTIBODY EACH: CPT

## 2022-04-11 PROCEDURE — 83550 IRON BINDING TEST: CPT

## 2022-04-11 PROCEDURE — 82728 ASSAY OF FERRITIN: CPT

## 2022-04-11 PROCEDURE — 82390 ASSAY OF CERULOPLASMIN: CPT

## 2022-04-11 PROCEDURE — 86231 EMA EACH IG CLASS: CPT

## 2022-04-11 PROCEDURE — 86038 ANTINUCLEAR ANTIBODIES: CPT

## 2022-04-12 LAB
ACTIN IGG SERPL-ACNC: 9 UNITS (ref 0–19)
CERULOPLASMIN SERPL-MCNC: 28.5 MG/DL (ref 19–39)
ENDOMYSIUM IGA SER QL: NEGATIVE
GLIADIN PEPTIDE IGA SER-ACNC: 9 UNITS (ref 0–19)
GLIADIN PEPTIDE IGG SER-ACNC: 6 UNITS (ref 0–19)
IGA SERPL-MCNC: 1168 MG/DL (ref 87–352)
MITOCHONDRIA M2 IGG SER-ACNC: <20 UNITS (ref 0–20)
TTG IGA SER-ACNC: <2 U/ML (ref 0–3)
TTG IGG SER-ACNC: 3 U/ML (ref 0–5)

## 2022-04-13 LAB
HAV IGM SER QL: NORMAL
HBV CORE IGM SER QL: NORMAL
HBV SURFACE AG SER QL: NORMAL
HCV AB SER QL: NORMAL
RYE IGE QN: NEGATIVE

## 2022-04-18 DIAGNOSIS — R76.8 HIGH TOTAL SERUM IGA: Primary | ICD-10-CM

## 2022-04-18 DIAGNOSIS — R79.89 ELEVATED LFTS: ICD-10-CM

## 2022-04-18 DIAGNOSIS — R74.8 ELEVATED ALKALINE PHOSPHATASE LEVEL: ICD-10-CM

## 2022-04-18 NOTE — PROGRESS NOTES
Urine and blood test ordered to further work up elevated IgA       Sallie Headley DO  [facility]  4/18/2022 10:04 AM

## 2022-04-19 ENCOUNTER — APPOINTMENT (OUTPATIENT)
Dept: LAB | Facility: HOSPITAL | Age: 72
End: 2022-04-19
Payer: COMMERCIAL

## 2022-04-19 DIAGNOSIS — R76.8 HIGH TOTAL SERUM IGA: ICD-10-CM

## 2022-04-19 PROCEDURE — 84165 PROTEIN E-PHORESIS SERUM: CPT

## 2022-04-19 PROCEDURE — 86334 IMMUNOFIX E-PHORESIS SERUM: CPT

## 2022-04-19 PROCEDURE — 84166 PROTEIN E-PHORESIS/URINE/CSF: CPT | Performed by: PATHOLOGY

## 2022-04-19 PROCEDURE — 86334 IMMUNOFIX E-PHORESIS SERUM: CPT | Performed by: PATHOLOGY

## 2022-04-19 PROCEDURE — 84165 PROTEIN E-PHORESIS SERUM: CPT | Performed by: PATHOLOGY

## 2022-04-19 PROCEDURE — 84166 PROTEIN E-PHORESIS/URINE/CSF: CPT | Performed by: FAMILY MEDICINE

## 2022-04-19 PROCEDURE — 36415 COLL VENOUS BLD VENIPUNCTURE: CPT

## 2022-04-20 LAB
ALBUMIN SERPL ELPH-MCNC: 3.46 G/DL (ref 3.5–5)
ALBUMIN SERPL ELPH-MCNC: 49.4 % (ref 52–65)
ALBUMIN UR ELPH-MCNC: 100 %
ALPHA1 GLOB MFR UR ELPH: 0 %
ALPHA1 GLOB SERPL ELPH-MCNC: 0.31 G/DL (ref 0.1–0.4)
ALPHA1 GLOB SERPL ELPH-MCNC: 4.4 % (ref 2.5–5)
ALPHA2 GLOB MFR UR ELPH: 0 %
ALPHA2 GLOB SERPL ELPH-MCNC: 0.77 G/DL (ref 0.4–1.2)
ALPHA2 GLOB SERPL ELPH-MCNC: 11 % (ref 7–13)
B-GLOBULIN MFR UR ELPH: 0 %
BETA GLOB ABNORMAL SERPL ELPH-MCNC: 0.9 G/DL (ref 0.4–0.8)
BETA1 GLOB SERPL ELPH-MCNC: 12.9 % (ref 5–13)
BETA2 GLOB SERPL ELPH-MCNC: 9.2 % (ref 2–8)
BETA2+GAMMA GLOB SERPL ELPH-MCNC: 0.64 G/DL (ref 0.2–0.5)
GAMMA GLOB ABNORMAL SERPL ELPH-MCNC: 0.92 G/DL (ref 0.5–1.6)
GAMMA GLOB MFR UR ELPH: 0 %
GAMMA GLOB SERPL ELPH-MCNC: 13.1 % (ref 12–22)
IGG/ALB SER: 0.98 {RATIO} (ref 1.1–1.8)
INTERPRETATION UR IFE-IMP: NORMAL
M PEAK ID 2: 2 %
M PROTEIN 1 MFR SERPL ELPH: 3.5 %
M PROTEIN 1 SERPL ELPH-MCNC: 0.25 G/DL
M PROTEIN 2 SERPL ELPH-MCNC: 0.14 G/DL
PROT PATTERN SERPL ELPH-IMP: ABNORMAL
PROT PATTERN UR ELPH-IMP: NORMAL
PROT SERPL-MCNC: 7 G/DL (ref 6.4–8.2)
PROT UR-MCNC: 14 MG/DL

## 2022-04-21 ENCOUNTER — TELEPHONE (OUTPATIENT)
Dept: HEMATOLOGY ONCOLOGY | Facility: CLINIC | Age: 72
End: 2022-04-21

## 2022-04-21 NOTE — TELEPHONE ENCOUNTER
New Patient Intake Form   Patient Details:    Venkata Res  1950    Appointment Information   Who is calling to schedule? Patient   If not self, what is the caller's name? Please put name of [facility] nurse as well  DID YOU CONFIRM INSURANCE WITH PATIENT? 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 No   Is there a biopsy ordered or pending? Please specify dates        Is patient aware of diagnosis? Yes   Have you had any imaging or labs done? If yes, where? (If imaging done outside of [facility], please remind patient to bring a disk ) Yes     4/19/22     If imaging done at outside facility, did you instruct patient to obtain discs and bring to visit? Have you been seen by another Oncologist/Hematologist?  If so, who and where? Yes    Patient states she saw a hematologist in [place]  Does not remember who she saw  Did not see someone in a hospital so she does not remember the name of the practice either     Are the records in [facility] or Care Everywhere? Yes   Does the patient have records at another facility/hospital? Yes  Patient states she saw a hematologist in [place]  Does not remember who she saw  Did not see someone in a hospital so she does not remember the name of the practice either     If yes, Name of facility, city and state where facility is located  Did you instruct patient to have records faxed to [facility] and provide [facility] number? Yes   Preferred [facility]   Is the patient willing to be seen by another provider? (This is for breast patients only)      Did you send new patient paperwork? Email or mail?  No   Miscellaneous Information: Appointment made for 4/29/22 at [address] [place] [address]

## 2022-04-21 NOTE — TELEPHONE ENCOUNTER
Patient called back regarding records  Patients states she has physical records from her previous hematologist  Dr Yonny Mckinley  Patient does not have a fax machine to fax records over and believed her previous hematologist is retired  Patient to bring records in to her appointment

## 2022-04-28 NOTE — PROGRESS NOTES
HEMATOLOGY CLINIC NOTE    Primary Care Provider: Maile Garcia DO  Referring Provider: Celine Madrid  MRN: [id number]  : 1950    Assessment / Plan:   1  Biclonal gammopathy  This is a 65-year-old female with a finding of biclonal gammopathy on SPEP  I suspect she had this tested due to her elevated alkaline phosphatase as well as elevated serum calcium at 10 3  SPEP revealed IgA kappa 0 25 M peak, IgA lambda 0 14 M peak  No kidney dysfunction or anemia on last lab work  She did mention today that she has bone pain in the right femur shaft area  She has no constitutional symptoms  I provided patient Education regarding MGUS finding  Actually, patient has had this since at least 2013 per scanned in lab work showed IgA kappa 0 64 M spike  She was aware of this finding  She understands that MGUS is a precancerous condition that can progress into multiple myeloma  We will obtain further workup including free light chain testing, bone survey, immunoglobulins  If there is significant abnormality seen in these areas, we will proceed with a bone marrow biopsy  Otherwise, if they are mildly elevated we will consider surveillance  - Ambulatory Referral to Hematology / Oncology  - Kappa/Lambda Light Chains Free With Ratio, Serum; Future  - Comprehensive metabolic panel; Future  - XR bone survey complete >12 mos; Future  - IgG, IgA, IgM; Future    2  Transaminitis  Patient had CMP recently showing AST 59, ALT 97, alkaline phosphatase 435  She has significant concern about the results of this  She does not look jaundiced today in visit  She did have workup including acute hepatitis panel, ceruloplasmin, anti smooth muscle antibody, antimitochondrial antibody which were all unrevealing  Patient states that she has not been drinking alcohol lately  However, she does take extra-strength Tylenol every day for osteoarthritis      I will repeat CMP today to see if this finding returns to normal   If not, we may consider ultrasound of abdomen  Patient does have a follow-up with Gastroenterology 05/18/2022  3  Hypercalcemia  Mild elevation 10 3  We will further evaluate with the above workup regarding MGUS  Once above results are finalized, I will call patient with next steps in management  Patient would like to wait before scheduling follow-up  Patient voiced understanding and agreement to the above  The patient knows to call the office with any questions or concerns regarding the above  I have spent  minutes with Patient and family today in which greater than 50% of this time was spent in counseling/coordination of care regarding Diagnostic results, Risks and benefits of tx options, Intructions for management, Patient and family education, Importance of tx compliance, Risk factor reductions and Impressions  Reason for visit:       Chief Complaint   Patient presents with    Consult     Biclonal gammopathy       History of Hematology Illness:     Wilfrid Mackenzie is a 73 y o  female who came in for follow up  1  MGUS, biclonal gammopathy   - Patient initial diagnosis of MGUS was 7/2013 IgA [place] 0 64 (per labs scanned in)  - 03/31/2022:  CBC showing normal, unremarkable findings with hemoglobin 13 7  CMP showed chloride 111, corrected calcium 10 3, AST 59, ALT 97, alk-phos 435, total protein 7 9, albumin 3 3  Kidney functions normal   Hepatitis panel normal   Iron panel normal   SPEP on 04/19 showed M peak 1 concentration 0 25, M peak 2 concentration 0 14  IgA kappa, IgA lambda seen  2  Transaminitis  - 3/31/22: Labs --> AST 59, ALT 97, Alkaline phosphatase 435    - 4/11/22: acute hepatitis panel normal    - Patient has GI appt 5/18  3  Hypercalcemia, seen on above labs     Interval History:   04/29/22: This is a 72-year-old female with GERD, hypertension, obesity, osteoarthritis, fibromyalgia who presents for consultation      Patient has no constitutional symptoms such as fever, chills, night sweats, lumps, bumps, sudden weight loss  Does have questionable bone pain in the shaft of the right femur  No bleeding  Patient has no history of definitive autoimmune diseases such as RA, lupus  Does have OA  She does not smoke or drink  She is retired and in the past worked as a ,   Patient has never had cancer before  She has no first-degree family history of cancer  Patient has her next colon cancer screening scheduled for 12/2022  She recently had a mammogram last month which was normal   Problem list:       Patient Active Problem List   Diagnosis    GERD (gastroesophageal reflux disease)    Colon cancer screening    Chest pain    Obesity, morbid ([facility] [address] )    Hypertension    Chronic left shoulder pain       REVIEW OF SYMPTOMS:   Review of Systems   Constitutional: Positive for fatigue  Negative for activity change, appetite change, chills, diaphoresis, fever and unexpected weight change  HENT: Negative for mouth sores and nosebleeds  Eyes: Negative for visual disturbance  Respiratory: Negative for apnea, cough, choking, chest tightness, shortness of breath, wheezing and stridor  Cardiovascular: Negative for chest pain, palpitations and leg swelling  Gastrointestinal: Negative for abdominal pain, anal bleeding, blood in stool, constipation, diarrhea, nausea and vomiting  Endocrine: Negative for cold intolerance  Genitourinary: Negative for hematuria, menstrual problem and vaginal bleeding  Musculoskeletal: Positive for arthralgias ( chronic joint pain due to OA )  States that she has bone pain in her right femur in the shaft area   Skin: Negative for color change, pallor and rash  Neurological: Negative for dizziness, syncope, light-headedness and headaches  Hematological: Negative for adenopathy  Does not bruise/bleed easily  Psychiatric/Behavioral: Negative for sleep disturbance         PHYSICAL EXAMINATION:     Visit Vitals  /92 (BP Location: Left arm, Patient Position: Sitting, Cuff Size: Large)   Pulse 61   Temp 98 7 °F (37 1 °C)   Resp 18   Ht 5' 3" (1 6 m)   Wt 121 kg (266 lb)   SpO2 97%   BMI 47 12 kg/m²   OB Status Postmenopausal   Smoking Status Never Smoker   BSA 2 19 m²       Ht Readings from Last 8 Encounters:   04/29/22 5' 3" (1 6 m)   04/04/22 5' 3" (1 6 m)   03/31/22 5' 3" (1 6 m)   03/25/22 5' 3" (1 6 m)   10/23/20 5' 3" (1 6 m)   09/11/20 5' 3" (1 6 m)   08/04/20 5' 3" (1 6 m)   08/04/20 5' 3" (1 6 m)       Wt Readings from Last 8 Encounters:   04/29/22 121 kg (266 lb)   04/04/22 117 kg (258 lb)   03/31/22 117 kg (258 lb)   03/25/22 120 kg (264 lb)   10/23/20 119 kg (262 lb)   09/11/20 119 kg (262 lb)   08/04/20 119 kg (262 lb 5 6 oz)   08/04/20 119 kg (261 lb 14 4 oz)          Physical Exam  Constitutional:       General: She is not in acute distress  Appearance: Normal appearance  She is not ill-appearing, toxic-appearing or diaphoretic  HENT:      Head: Normocephalic and atraumatic  Eyes:      General: No scleral icterus  Extraocular Movements: Extraocular movements intact  Conjunctiva/sclera: Conjunctivae normal       Pupils: Pupils are equal, round, and reactive to light  Cardiovascular:      Rate and Rhythm: Normal rate and regular rhythm  Heart sounds: Normal heart sounds  Pulmonary:      Effort: Pulmonary effort is normal  No respiratory distress  Breath sounds: Normal breath sounds  No stridor  No wheezing, rhonchi or rales  Abdominal:      Palpations: Abdomen is soft  Tenderness: There is no abdominal tenderness  Musculoskeletal:         General: No tenderness  Normal range of motion  Cervical back: Normal range of motion and neck supple  Right lower leg: Edema (Chronic, symmetrical bilateral edema  Not new per patient ) present  Left lower leg: Edema present  Lymphadenopathy:      Cervical: No cervical adenopathy     Skin:     General: Skin is warm and dry  Coloration: Skin is not jaundiced or pale  Findings: No bruising, erythema, lesion or rash  Neurological:      General: No focal deficit present  Mental Status: She is alert and oriented to person, place, and time  Mental status is at baseline  Cranial Nerves: No cranial nerve deficit  Motor: No weakness  Psychiatric:         Mood and Affect: Mood normal          Behavior: Behavior normal          Thought Content: Thought content normal          Judgment: Judgment normal        Reviewed historical information        PAST MEDICAL HISTORY:    Past Medical History:   Diagnosis Date    GERD (gastroesophageal reflux disease)     Obesity        PAST SURGICAL HISTORY:    Past Surgical History:   Procedure Laterality Date    CARDIAC CATHETERIZATION  2020     SECTION      ESOPHAGOGASTRODUODENOSCOPY      OVARIAN CYST REMOVAL           CURRENT MEDICATIONS:     Current Outpatient Medications:     acetaminophen (TYLENOL) 325 mg tablet, Take 2 tablets (650 mg total) by mouth every 4 (four) hours as needed for mild pain or headaches, Disp: 30 tablet, Rfl: 0    Ascorbic Acid (vitamin C) 1000 MG tablet, Take 1,000 mg by mouth daily, Disp: , Rfl:     b complex vitamins capsule, Take 1 capsule by mouth daily, Disp: , Rfl:     calcium carbonate (OS-SUSIE) 600 MG tablet, Take 600 mg by mouth 2 (two) times a day with meals, Disp: , Rfl:     carvedilol (COREG) 12 5 mg tablet, TAKE 1 TABLET TWICE A DAY WITH MEALS, Disp: 180 tablet, Rfl: 3    cetirizine (ZyrTEC) 10 mg tablet, Take 1 tablet (10 mg total) by mouth daily, Disp: 90 tablet, Rfl: 0    Cholecalciferol (Vitamin D-3) 25 MCG (1000 UT) CAPS, Take 1,000 Units by mouth, Disp: , Rfl:     Coenzyme Q10 (CoQ10) 100 MG CAPS, Take by mouth, Disp: , Rfl:     esomeprazole (NexIUM) 20 mg capsule, Take 20 mg by mouth 2 (two) times a day, Disp: , Rfl:     Krill Oil Omega-3 500 MG CAPS, Take by mouth, Disp: , Rfl:    Multiple Vitamin (Multi Vitamin Daily) TABS, Take by mouth, Disp: , Rfl:     Plant Sterols and Stanols (CHOLESTOFF PO), Take 1 tablet by mouth daily, Disp: , Rfl:     triamcinolone (KENALOG) 0 5 % ointment, Apply topically 2 (two) times a day, Disp: 30 g, Rfl: 0    Turmeric 500 MG CAPS, Take 500 mg by mouth, Disp: , Rfl:     Xiidra 5 % op solution, Administer 1 drop to both eyes daily, Disp: , Rfl:     aspirin 81 mg chewable tablet, Chew 1 tablet (81 mg total) daily, Disp: 30 tablet, Rfl: 0    atorvastatin (LIPITOR) 40 mg tablet, Take 1 tablet (40 mg total) by mouth every evening, Disp: 90 tablet, Rfl: 3    bepotastine besilate (BEPREVE) 1 5 % op soln, Administer 1 drop to both eyes daily (Patient not taking: Reported on 4/4/2022 ), Disp: , Rfl:     valACYclovir (VALTREX) 500 mg tablet, Take 1 tablet (500 mg total) by mouth 2 (two) times a day for 3 days, Disp: 12 tablet, Rfl: 3    Family History   Social History     Tobacco Use    Smoking status: Never Smoker    Smokeless tobacco: Never Used   Vaping Use    Vaping Use: Never used   Substance Use Topics    Alcohol use:  Yes    Drug use: Never    Relation Age of Onset     Mother      Mother      Father      Maternal Grandmother      Maternal Grandfather      Paternal  Family History   Problem Relation Age of Onset    Hypertension Mother     Arthritis Mother     Heart disease Father     No Known Problems Maternal Grandmother     No Known Problems Maternal Grandfather     No Known Problems Paternal Grandmother     No Known Problems Paternal Grandfather     No Known Problems Maternal Aunt     No Known Problems Maternal Aunt     Grandmother     No Known Problems Paternal Grandfather     No Known Problems Maternal Aunt     No Known Problems Maternal Aunt       Allergen Reactions    Penicillins      dizziness    Latex Rash       Lab Re  Lab Results   Component Value Date    WBC 7 37 03/31/2022    HGB 13 7 03/31/2022    HCT 43 0 03/31/2022    MCV 93 03/31/2022     03/31/2022   sults   Component Value Date    WBC 7 37 03/31/2022    HGB 13 7 03/31/2022    HCT 43 0 03/31/2022    MCV 93 03/31/2022     03/31/2022      Component Value Date    SODIUM 141 04/29/2022    K 3 9 04/29/2022     04/29/2022    CO2 27 04/29/2022    AGAP 9 04/29/2022    BUN 15 04/29/2022    CREATININE 0 99 04/29/2022    GLUC 87 04/29/2022    GLUF 96 03/31/2022    CALCIUM 9 5 04/29/2022    AST 23 04/29/2022    ALT 29 04/29/2022    ALKPHOS 140 (H) 04/29/2022    TP 8 2 04/29/2022    TBILI 0 38 04/29/2022    EGFR 58 04/29/2022       IMAGING:  Mammo screening bilateral w 3d & cad  Narrative: DIAGNOSIS: Encounter for screening mammogram for breast cancer     TECHNIQUE:  Digital screening mammography was performed  Computer Aided Detection   (CAD) analyzed all applicable images  COMPARISONS: Prior breast imaging dated: 11/08/2019    RELEVANT HISTORY:   Family Breast Cancer History: No known family history of breast cancer  Family Medical History: No known relevant family medical history  Personal History: No known relevant hormone history  No known relevant   surgical history  No known relevant medical history  The patient is scheduled in a reminder system for screening mammography  8-10% of cancers will be missed on mammography  Management of a palpable   abnormality must be based on clinical grounds  Patients will be notified   of their results via letter from our facility  Accredited by [employer] and FDA  RISK ASSESSMENT:   5 Year [place]: 0 94 %  10 Year [place]uz[place]: 1 97 %  Lifetime [place]uz[place]: 3 37 %    TISSUE DENSITY:   The breasts are almost entirely fatty  INDICATION: Gladys Calvillo is a 73 y o  female presenting for screening   mammography  FINDINGS:   There are no suspicious masses, grouped microcalcifications or areas of   architectural distortion   The skin and nipple areolar complex are unremarkable  Impression: No mammographic evidence of malignancy  ASSESSMENT/BI-RADS CATEGORY:  Left: 1 - Negative  Right: 1 - Negative  Overall: 1 - Negative    RECOMMENDATION:       - Routine screening mammogram in 1 year for both breasts  Workstation ID: [id number]  Mammo outside images  1 2 [id number]  2 [id number]

## 2022-04-29 ENCOUNTER — CONSULT (OUTPATIENT)
Dept: HEMATOLOGY ONCOLOGY | Facility: CLINIC | Age: 72
End: 2022-04-29
Payer: COMMERCIAL

## 2022-04-29 ENCOUNTER — HOSPITAL ENCOUNTER (OUTPATIENT)
Dept: RADIOLOGY | Facility: HOSPITAL | Age: 72
Discharge: HOME | End: 2022-04-29
Payer: COMMERCIAL

## 2022-04-29 ENCOUNTER — LAB (OUTPATIENT)
Dept: LAB | Facility: HOSPITAL | Age: 72
End: 2022-04-29
Payer: COMMERCIAL

## 2022-04-29 VITALS
OXYGEN SATURATION: 97 % | BODY MASS INDEX: 47.13 KG/M2 | DIASTOLIC BLOOD PRESSURE: 92 MMHG | SYSTOLIC BLOOD PRESSURE: 160 MMHG | HEIGHT: 63 IN | HEART RATE: 61 BPM | TEMPERATURE: 98.7 F | WEIGHT: 266 LBS | RESPIRATION RATE: 18 BRPM

## 2022-04-29 DIAGNOSIS — R74.01 TRANSAMINITIS: ICD-10-CM

## 2022-04-29 DIAGNOSIS — D47.2 BICLONAL GAMMOPATHY: Primary | ICD-10-CM

## 2022-04-29 DIAGNOSIS — D47.2 BICLONAL GAMMOPATHY: ICD-10-CM

## 2022-04-29 DIAGNOSIS — E83.52 HYPERCALCEMIA: ICD-10-CM

## 2022-04-29 LAB
ALBUMIN SERPL BCP-MCNC: 3.4 G/DL (ref 3.5–5)
ALP SERPL-CCNC: 140 U/L (ref 46–116)
ALT SERPL W P-5'-P-CCNC: 29 U/L (ref 12–78)
ANION GAP SERPL CALCULATED.3IONS-SCNC: 9 MMOL/L (ref 4–13)
AST SERPL W P-5'-P-CCNC: 23 U/L (ref 5–45)
BILIRUB SERPL-MCNC: 0.38 MG/DL (ref 0.2–1)
BUN SERPL-MCNC: 15 MG/DL (ref 5–25)
CALCIUM ALBUM COR SERPL-MCNC: 10 MG/DL (ref 8.3–10.1)
CALCIUM SERPL-MCNC: 9.5 MG/DL (ref 8.3–10.1)
CHLORIDE SERPL-SCNC: 105 MMOL/L (ref 100–108)
CO2 SERPL-SCNC: 27 MMOL/L (ref 21–32)
CREAT SERPL-MCNC: 0.99 MG/DL (ref 0.6–1.3)
GFR SERPL CREATININE-BSD FRML MDRD: 58 ML/MIN/1.73SQ M
GLUCOSE SERPL-MCNC: 87 MG/DL (ref 65–140)
IGA SERPL-MCNC: 1240 MG/DL (ref 70–400)
IGG SERPL-MCNC: 1210 MG/DL (ref 700–1600)
IGM SERPL-MCNC: 26 MG/DL (ref 40–230)
POTASSIUM SERPL-SCNC: 3.9 MMOL/L (ref 3.5–5.3)
PROT SERPL-MCNC: 8.2 G/DL (ref 6.4–8.2)
SODIUM SERPL-SCNC: 141 MMOL/L (ref 136–145)

## 2022-04-29 PROCEDURE — 99204 OFFICE O/P NEW MOD 45 MIN: CPT | Performed by: INTERNAL MEDICINE

## 2022-04-29 PROCEDURE — 36415 COLL VENOUS BLD VENIPUNCTURE: CPT

## 2022-04-29 PROCEDURE — 82784 ASSAY IGA/IGD/IGG/IGM EACH: CPT

## 2022-04-29 PROCEDURE — 77075 RADEX OSSEOUS SURVEY COMPL: CPT

## 2022-04-29 PROCEDURE — 83521 IG LIGHT CHAINS FREE EACH: CPT

## 2022-04-29 PROCEDURE — 80053 COMPREHEN METABOLIC PANEL: CPT

## 2022-04-29 PROCEDURE — 3008F BODY MASS INDEX DOCD: CPT | Performed by: DERMATOLOGY

## 2022-04-30 LAB
KAPPA LC FREE SER-MCNC: 53.1 MG/L (ref 3.3–19.4)
KAPPA LC FREE/LAMBDA FREE SER: 1.45 {RATIO} (ref 0.26–1.65)
LAMBDA LC FREE SERPL-MCNC: 36.5 MG/L (ref 5.7–26.3)

## 2022-05-10 ENCOUNTER — TELEPHONE (OUTPATIENT)
Dept: ADMINISTRATIVE | Facility: OTHER | Age: 72
End: 2022-05-10

## 2022-05-10 NOTE — TELEPHONE ENCOUNTER
----- Message from Lor Nickerson sent at 5/9/2022  3:37 PM EDT -----  Regarding: Care gap request  05/09/22 3:37 PM    Hello, our patient Nelia Alexander has had CRC: Cologuard completed/performed  Please assist in updating the patient chart by pulling the document from the Media Tab  The date of service is 12/04/2019        Thank you,  Lor Nickerson  [facility] [address]

## 2022-05-18 ENCOUNTER — CONSULT (OUTPATIENT)
Dept: GASTROENTEROLOGY | Facility: MEDICAL CENTER | Age: 72
End: 2022-05-18
Payer: COMMERCIAL

## 2022-05-18 ENCOUNTER — TELEPHONE (OUTPATIENT)
Dept: GASTROENTEROLOGY | Facility: MEDICAL CENTER | Age: 72
End: 2022-05-18

## 2022-05-18 VITALS
DIASTOLIC BLOOD PRESSURE: 78 MMHG | WEIGHT: 266 LBS | TEMPERATURE: 98.7 F | HEART RATE: 68 BPM | BODY MASS INDEX: 47.12 KG/M2 | SYSTOLIC BLOOD PRESSURE: 139 MMHG

## 2022-05-18 DIAGNOSIS — K44.9 HIATAL HERNIA: ICD-10-CM

## 2022-05-18 DIAGNOSIS — K59.00 CONSTIPATION, UNSPECIFIED CONSTIPATION TYPE: ICD-10-CM

## 2022-05-18 DIAGNOSIS — R13.10 DYSPHAGIA, UNSPECIFIED TYPE: ICD-10-CM

## 2022-05-18 DIAGNOSIS — K21.9 GASTROESOPHAGEAL REFLUX DISEASE WITHOUT ESOPHAGITIS: Primary | ICD-10-CM

## 2022-05-18 PROCEDURE — 99214 OFFICE O/P EST MOD 30 MIN: CPT | Performed by: INTERNAL MEDICINE

## 2022-05-18 PROCEDURE — 1036F TOBACCO NON-USER: CPT | Performed by: INTERNAL MEDICINE

## 2022-05-18 RX ORDER — CYCLOSPORINE 0 G/ML
SOLUTION/ DROPS OPHTHALMIC; TOPICAL
COMMUNITY
Start: 2022-05-13

## 2022-05-18 NOTE — TELEPHONE ENCOUNTER
Scheduled date of 8/29/22 (as of today) 5/18/22  Physician performing Dr Shanice Renee:  Location of procedure  [facility]:   Bowel prep reviewed with patient: EGD  Instructions reviewed with patient by: MA  Clearances:

## 2022-05-18 NOTE — PROGRESS NOTES
[facility] - Outpatient Consultation  Robin Monique 73 y o  female MRN: [id number]  Encounter: [id number]          ASSESSMENT AND PLAN:   73 female with history of GERD and hypertension presents for evaluation  1  Gastroesophageal reflux disease without esophagitis  2  Hiatal hernia  3  Dysphagia, unspecified type  She has history of chronic reflux, dysphagia for many years  She has had PPI use for over 20 years  Her recent endoscopy in 2019 showed a small hiatal hernia was otherwise normal   She also underwent an esophagram in 2020 also with small hiatal hernia no evidence of GERD  She remains on twice daily Nexium still with ongoing symptoms  I discussed dietary/lifestyle anti-reflux measures with her today  I recommend repeat EGD performed given that has been 2 years since her last for evaluation of her hernia, to obtain esophageal biopsies as well  If her endoscopy is normal she would benefit from outpatient manometry and pH testing while off PPI to document overt evidence of acid reflux and evaluate for functional heartburn as well  - EGD; Future    4  Constipation, unspecified constipation type  She has chronic constipation with hard difficult to pass stools  She does respond to fiber and will continue with supplementation  She has failed MiraLax in the past   I recommend starting Linzess 145 micro grams daily  I discussed this can cause loose stools in the 1st 2 weeks of use  She will call my office in 2 weeks with an update of her symptoms and if needed her dose can be titrated at that time  - linaCLOtide 145 MCG CAPS; Take 1 capsule (145 mcg total) by mouth in the morning  Dispense: 30 capsule; Refill: 3      ______________________________________________________________________    HPI:  73 female with history of GERD and hypertension presents for evaluation  She reports chronic symptoms of gastroesophageal reflux and dysphagia for many years    She reports use of PPI eyes route 20 years  Symptoms include substernal chest burning and globus sensation  She also has intermittent solid dysphagia  She denies odynophagia or liquid dysphagia  Symptoms are intermittent  He has tried to avoid certain trigger foods  She has noticed PPIs work at times and not otherwise  She also reports chronic constipation with bowel movement every 2-3 days which is hard and difficult to pass  She takes Metamucil 1 on this regimen has a bowel movement daily but does report a sense of incomplete evacuation  She denies rectal bleeding she reports using MiraLax in the past daily with improvement of her symptoms     She has no family history of gastrointestinal disease including colorectal cancer  She reports averaging 4 EGDs done in [place] in the past all of which were normal   She reports having a colonoscopy 10 years ago which was also normal, procedure report is not available for review       Cologuard 2019 negative    2019 EGD showed gastritis and hiatal hernia gastric biopsies were negative for H pylori  2020 esophagram shows small hiatal hernia no evidence of GERD  2020 abdominal ultrasound and HIDA scan are within normal limits        REVIEW OF SYSTEMS:    CONSTITUTIONAL: Denies any fever, chills, rigors, and weight loss  HEENT: No earache or tinnitus  Denies hearing loss or visual disturbances  CARDIOVASCULAR: No chest pain or palpitations  RESPIRATORY: Denies any cough, hemoptysis, shortness of breath or dyspnea on exertion  GASTROINTESTINAL: As noted in the History of Present Illness  GENITOURINARY: No problems with urination  Denies any hematuria or dysuria  NEUROLOGIC: No dizziness or vertigo, denies headaches  MUSCULOSKELETAL: Denies any muscle or joint pain  SKIN: Denies skin rashes or itching  ENDOCRINE: Denies excessive thirst  Denies intolerance to heat or cold  PSYCHOSOCIAL: Denies depression or anxiety  Denies any recent memory loss         Historical Information   Past Medical History:   Diagnosis Date    GERD (gastroesophageal reflux disease)     Obesity      Past Surgical History:   Procedure Laterality Date    CARDIAC CATHETERIZATION  2020     SECTION      ESOPHAGOGASTRODUODENOSCOPY      OVARIAN CYST REMOVAL       Social History   Social History     Substance and Sexual Activity   Alcohol Use Yes     Social History     Substance and Sexual Activity   Drug Use Never     Social History     Tobacco Use   Smoking Status Never Smoker   Smokeless Tobacco Never Used     Family History   Problem Relation Age of Onset    Hypertension Mother     Arthritis Mother     Heart disease Father     No Known Problems Maternal Grandmother     No Known Problems Maternal Grandfather     No Known Problems Paternal Grandmother     No Known Problems Paternal Grandfather     No Known Problems Maternal Aunt     No Known Problems Maternal Aunt        Meds/Allergies       Current Outpatient Medications:     acetaminophen (TYLENOL) 325 mg tablet    Ascorbic Acid (vitamin C) 1000 MG tablet    aspirin 81 mg chewable tablet    atorvastatin (LIPITOR) 40 mg tablet    b complex vitamins capsule    bepotastine besilate (BEPREVE) 1 5 % op soln    calcium carbonate (OS-SUSIE) 600 MG tablet    carvedilol (COREG) 12 5 mg tablet    cetirizine (ZyrTEC) 10 mg tablet    Cholecalciferol (Vitamin D-3) 25 MCG (1000 UT) CAPS    Coenzyme Q10 (CoQ10) 100 MG CAPS    esomeprazole (NexIUM) 20 mg capsule    Krill Oil Omega-3 500 MG CAPS    Multiple Vitamin (Multi Vitamin Daily) TABS    Plant Sterols and Stanols (CHOLESTOFF PO)    triamcinolone (KENALOG) 0 5 % ointment    Turmeric 500 MG CAPS    valACYclovir (VALTREX) 500 mg tablet    Xiidra 5 % op solution    Allergies   Allergen Reactions    Penicillins      dizziness    Latex Rash           Objective     Blood pressure 139/78, pulse 68, temperature 98 7 °F (37 1 °C), weight 121 kg (266 lb), not currently breastfeeding  There is no height or weight on file to calculate BMI  PHYSICAL EXAM:      General Appearance:   Alert, cooperative, no distress   HEENT:   Normocephalic, atraumatic, anicteric  Neck:  Supple, symmetrical, trachea midline   Lungs:   Clear to auscultation bilaterally; no rales, rhonchi or wheezing; respirations unlabored    Heart[de-identified]   Regular rate and rhythm; no murmur, rub, or gallop  Abdomen:   Soft, non-tender, non-distended; normal bowel sounds; no masses, no organomegaly    Genitalia:   Deferred    Rectal:   Deferred    Extremities:  No cyanosis, clubbing or edema    Pulses:  2+ and symmetric    Skin:  No jaundice, rashes, or lesions    Lymph nodes:  No palpable cervical lymphadenopathy        Lab Results:   No visits with results within 1 Day(s) from this visit  Latest known visit with results is:   Telephone on 05/10/2022   Component Date Value    Cologuard Result 12/04/2019 Negative     Cologuard Result 12/04/2019 Negative          Radiology Results:   XR bone survey complete >12 mos    Result Date: 5/9/2022  Narrative: BONE SURVEY INDICATION:   D47 2: Monoclonal gammopathy  COMPARISON:  None VIEWS:  XR BONE SURVEY COMPLETE >12 MOS Images: 19 FINDINGS: SKULL (LATERAL VIEW):  Unremarkable  CERVICAL SPINE (LATERAL VIEW):    Unremarkable  HUMERI (AP VIEW):   Unremarkable  THORACIC SPINE (AP LATERAL VIEWS):   Unremarkable  RIBS:  Unremarkable  LUMBAR SPINE (AP LATERAL VIEWS):   Unremarkable  PELVIS:   Unremarkable  FEMORA (AP VIEW):    Unremarkable  TIBIA-FIBULA (AP VIEW):   Unremarkable  Impression: No destructive osseous lesions   Workstation performed: [id number]

## 2022-05-18 NOTE — PATIENT INSTRUCTIONS
High Fiber Diet   AMBULATORY CARE:   A high-fiber diet  includes foods that have a high amount of fiber  Fiber is the part of fruits, vegetables, and grains that is not broken down by your body  Fiber keeps your bowel movements regular  Fiber can also help lower your cholesterol level, control blood sugar in people with diabetes, and relieve constipation  Fiber can also help you control your weight because it helps you feel full faster  Most adults should eat 25 to 35 grams of fiber each day  Talk to your dietitian or healthcare provider about the amount of fiber you need  Good sources of fiber:       Foods with at least 4 grams of fiber per serving:      ? to ½ cup of high-fiber cereal (check the nutrition label on the box)    ½ cup of blackberries or raspberries    4 dried prunes    1 cooked artichoke    ½ cup of cooked legumes, such as lentils, or red, kidney, and callejas beans    Foods with 1 to 3 grams of fiber per servin slice of whole-wheat, pumpernickel, or rye bread    ½ cup of cooked brown rice    4 whole-wheat crackers    1 cup of oatmeal    ½ cup of cereal with 1 to 3 grams of fiber per serving (check the nutrition label on the box)    1 small piece of fruit, such as an apple, banana, pear, kiwi, or orange    3 dates    ½ cup of canned apricots, fruit cocktail, peaches, or pears    ½ cup of raw or cooked vegetables, such as carrots, cauliflower, cabbage, spinach, squash, or corn    Ways that you can increase fiber in your diet:   Choose brown or wild rice instead of white rice  Use whole wheat flour in recipes instead of white or all-purpose flour  Add beans and peas to casseroles or soups  Choose fresh fruit and vegetables with peels or skins on instead of juices  Other diet guidelines to follow: Add fiber to your diet slowly  You may have abdominal discomfort, bloating, and gas if you add fiber to your diet too quickly       Drink plenty of liquids as you add fiber to your diet   You may have nausea or develop constipation if you do not drink enough water  Ask how much liquid to drink each day and which liquids are best for you  © Copyright [employer] 2022 Information is for End User's use only and may not be sold, redistributed or otherwise used for commercial purposes  All illustrations and images included in CareNotes® are the copyrighted property of [employer]  or [facility] Racheal Kramer   The above information is an  only  It is not intended as medical advice for individual conditions or treatments  Talk to your doctor, nurse or pharmacist before following any medical regimen to see if it is safe and effective for you

## 2022-06-08 ENCOUNTER — OFFICE VISIT (OUTPATIENT)
Dept: FAMILY MEDICINE CLINIC | Facility: CLINIC | Age: 72
End: 2022-06-08
Payer: COMMERCIAL

## 2022-06-08 VITALS
SYSTOLIC BLOOD PRESSURE: 146 MMHG | WEIGHT: 261 LBS | BODY MASS INDEX: 46.25 KG/M2 | DIASTOLIC BLOOD PRESSURE: 94 MMHG | HEIGHT: 63 IN | TEMPERATURE: 98.1 F | OXYGEN SATURATION: 96 % | HEART RATE: 90 BPM

## 2022-06-08 DIAGNOSIS — I10 PRIMARY HYPERTENSION: ICD-10-CM

## 2022-06-08 DIAGNOSIS — R60.0 BILATERAL LOWER EXTREMITY EDEMA: ICD-10-CM

## 2022-06-08 DIAGNOSIS — I35.0 AORTIC STENOSIS, MILD: Primary | ICD-10-CM

## 2022-06-08 PROCEDURE — 1036F TOBACCO NON-USER: CPT | Performed by: FAMILY MEDICINE

## 2022-06-08 PROCEDURE — 3077F SYST BP >= 140 MM HG: CPT | Performed by: FAMILY MEDICINE

## 2022-06-08 PROCEDURE — 3080F DIAST BP >= 90 MM HG: CPT | Performed by: FAMILY MEDICINE

## 2022-06-08 PROCEDURE — 3008F BODY MASS INDEX DOCD: CPT | Performed by: FAMILY MEDICINE

## 2022-06-08 PROCEDURE — 99214 OFFICE O/P EST MOD 30 MIN: CPT | Performed by: FAMILY MEDICINE

## 2022-06-08 PROCEDURE — 1160F RVW MEDS BY RX/DR IN RCRD: CPT | Performed by: FAMILY MEDICINE

## 2022-06-08 NOTE — PROGRESS NOTES
Assessment/Plan:    No problem-specific Assessment & Plan notes found for this encounter  Diagnoses and all orders for this visit:    Aortic stenosis, mild  -     Echo complete w/ contrast if indicated; Future    Bilateral lower extremity edema  -     Echo complete w/ contrast if indicated; Future    Primary hypertension      Will repeat echo for surveillance  Patient to keep BP log as she is hesitant to have BP medications adjusted at this time  Discussed compression stockings  Subjective:      Patient ID: Louann Galaviz is a 73 y o  female  HPI     Notes that for about the last 9 months her legs have consistently been swollen worse than usually  States that she was using and OTC diuretic (which she had previously used) but this was not helpful  Patient thinks that the carvedilol if causing the leg swelling    Denies any changes in her urinary habits  States that she urinates a few times per day  Denies dysuria or hematuria  Notes that she has intermittently had leg swelling previously (before the carvedilol) but it was not to this extent  Patient states that she checks BP at home intermittently  States that this is usually in the 140s/80s  The following portions of the patient's history were reviewed and updated as appropriate: allergies, current medications, past family history, past medical history, past social history, past surgical history and problem list     Review of Systems      Objective:  /94   Pulse 90   Temp 98 1 °F (36 7 °C)   Ht 5' 3" (1 6 m)   Wt 118 kg (261 lb)   SpO2 96%   BMI 46 23 kg/m²      Physical Exam  Vitals reviewed  Constitutional:       General: She is not in acute distress  Appearance: Normal appearance  She is obese  HENT:      Head: Normocephalic and atraumatic        Right Ear: External ear normal       Left Ear: External ear normal       Nose: Nose normal       Mouth/Throat:      Mouth: Mucous membranes are moist    Eyes:      Extraocular Movements: Extraocular movements intact  Conjunctiva/sclera: Conjunctivae normal       Pupils: Pupils are equal, round, and reactive to light  Cardiovascular:      Rate and Rhythm: Normal rate and regular rhythm  Heart sounds: Normal heart sounds  Pulmonary:      Effort: Pulmonary effort is normal       Breath sounds: Normal breath sounds  No wheezing, rhonchi or rales  Abdominal:      General: Bowel sounds are normal  There is no distension  Palpations: Abdomen is soft  Tenderness: There is no abdominal tenderness  Musculoskeletal:         General: No deformity  Cervical back: Neck supple  Right lower leg: Edema present  Left lower leg: Edema present  Lymphadenopathy:      Cervical: No cervical adenopathy  Skin:     General: Skin is warm  Capillary Refill: Capillary refill takes less than 2 seconds  Findings: No rash  Neurological:      Mental Status: She is alert  Mental status is at baseline           DO Chinmay Robles [facility]  6/28/2022 9:39 AM

## 2022-06-30 ENCOUNTER — HOSPITAL ENCOUNTER (OUTPATIENT)
Dept: NON INVASIVE DIAGNOSTICS | Facility: CLINIC | Age: 72
Discharge: HOME | End: 2022-06-30
Payer: COMMERCIAL

## 2022-06-30 VITALS
HEART RATE: 65 BPM | BODY MASS INDEX: 46.25 KG/M2 | HEIGHT: 63 IN | SYSTOLIC BLOOD PRESSURE: 146 MMHG | WEIGHT: 261 LBS | DIASTOLIC BLOOD PRESSURE: 96 MMHG

## 2022-06-30 DIAGNOSIS — R60.0 BILATERAL LOWER EXTREMITY EDEMA: ICD-10-CM

## 2022-06-30 DIAGNOSIS — I35.0 AORTIC STENOSIS, MILD: ICD-10-CM

## 2022-06-30 LAB
AORTIC ROOT: 3.2 CM
AORTIC VALVE MEAN VELOCITY: 17.8 M/S
APICAL FOUR CHAMBER EJECTION FRACTION: 65 %
ASCENDING AORTA: 3.1 CM
AV AREA BY CONTINUOUS VTI: 1.6 CM2
AV AREA PEAK VELOCITY: 1.8 CM2
AV LVOT MEAN GRADIENT: 3 MMHG
AV LVOT PEAK GRADIENT: 6 MMHG
AV MEAN GRADIENT: 14 MMHG
AV PEAK GRADIENT: 24 MMHG
AV REGURGITATION PRESSURE HALF TIME: 742 MS
AV VALVE AREA: 1.62 CM2
AV VELOCITY RATIO: 0.51
DOP CALC AO PEAK VEL: 2.46 M/S
DOP CALC AO VTI: 62.94 CM
DOP CALC LVOT AREA: 3.46 CM2
DOP CALC LVOT DIAMETER: 2.1 CM
DOP CALC LVOT PEAK VEL VTI: 29.5 CM
DOP CALC LVOT PEAK VEL: 1.26 M/S
DOP CALC LVOT STROKE INDEX: 45.2 ML/M2
DOP CALC LVOT STROKE VOLUME: 102.12
E WAVE DECELERATION TIME: 221 MS
FRACTIONAL SHORTENING: 31 (ref 28–44)
INTERVENTRICULAR SEPTUM IN DIASTOLE (PARASTERNAL SHORT AXIS VIEW): 1.2 CM
INTERVENTRICULAR SEPTUM: 1.2 CM (ref 0.6–1.1)
LAAS-AP2: 26 CM2
LAAS-AP4: 19 CM2
LEFT ATRIUM AREA SYSTOLE SINGLE PLANE A4C: 18.7 CM2
LEFT ATRIUM SIZE: 4.8 CM
LEFT INTERNAL DIMENSION IN SYSTOLE: 3.4 CM (ref 2.1–4)
LEFT VENTRICULAR INTERNAL DIMENSION IN DIASTOLE: 4.9 CM (ref 3.5–6)
LEFT VENTRICULAR POSTERIOR WALL IN END DIASTOLE: 1 CM
LEFT VENTRICULAR STROKE VOLUME: 65 ML
LVSV (TEICH): 65 ML
MITRAL REGURGITATION PEAK VELOCITY: 3.03 M/S
MITRAL VALVE REGURGITANT PEAK GRADIENT: 37 MMHG
MV E'TISSUE VEL-SEP: 10 CM/S
MV PEAK A VEL: 0.72 M/S
MV PEAK E VEL: 90 CM/S
MV STENOSIS PRESSURE HALF TIME: 64 MS
MV VALVE AREA P 1/2 METHOD: 3.44
RIGHT ATRIAL 2D VOLUME: 43 ML
RIGHT ATRIUM AREA SYSTOLE A4C: 14.3 CM2
RIGHT VENTRICLE ID DIMENSION: 4.5 CM
SL CV AV DECELERATION TIME RETROGRADE: 2558 MS
SL CV AV PEAK GRADIENT RETROGRADE: 106 MMHG
SL CV LEFT ATRIUM LENGTH A2C: 6.3 CM
SL CV PED ECHO LEFT VENTRICLE DIASTOLIC VOLUME (MOD BIPLANE) 2D: 113 ML
SL CV PED ECHO LEFT VENTRICLE SYSTOLIC VOLUME (MOD BIPLANE) 2D: 47 ML

## 2022-06-30 PROCEDURE — 93306 TTE W/DOPPLER COMPLETE: CPT | Performed by: INTERNAL MEDICINE

## 2022-06-30 PROCEDURE — 93306 TTE W/DOPPLER COMPLETE: CPT

## 2022-08-22 ENCOUNTER — TELEPHONE (OUTPATIENT)
Dept: OTHER | Facility: OTHER | Age: 72
End: 2022-08-22

## 2022-08-22 NOTE — TELEPHONE ENCOUNTER
Patient stated she received letter from her insurance that her authorization for her upcoming EGD was denied  Patient wanted to follow up with the office

## 2022-08-23 ENCOUNTER — TELEPHONE (OUTPATIENT)
Dept: GASTROENTEROLOGY | Facility: CLINIC | Age: 72
End: 2022-08-23

## 2022-08-23 NOTE — TELEPHONE ENCOUNTER
Patients GI provider:  Dr Chad Gomes    Number to return call:     Reason for call: Pt insurance calling stating prior auth for upcoming egd was withdrawn by provider  Patient was also on the phone  They stated this procedure requires a prior auth      Scheduled procedure/appointment date if applicable: procedure 6/75

## 2022-08-23 NOTE — TELEPHONE ENCOUNTER
Patient called again because she received another call about her EGD  She would like it cancelled because there is no authorization and she will discuss with Dr Kimber Lopes at her September 12th appointment  Please cancel her EGD  Thank you!

## 2022-08-23 NOTE — TELEPHONE ENCOUNTER
Contacted patient to discuss upcoming EGD  Patient was concerned that she received a letter stating that the EGD auth was dismissed  After review of patients chart, Auth for EGD has been approved and patient can proceed with the procedure

## 2022-08-29 DIAGNOSIS — I10 ESSENTIAL HYPERTENSION: ICD-10-CM

## 2022-08-29 RX ORDER — CARVEDILOL 12.5 MG/1
TABLET ORAL
Qty: 180 TABLET | Refills: 3 | Status: SHIPPED | OUTPATIENT
Start: 2022-08-29

## 2022-09-12 ENCOUNTER — TELEPHONE (OUTPATIENT)
Dept: GASTROENTEROLOGY | Facility: MEDICAL CENTER | Age: 72
End: 2022-09-12

## 2022-09-12 ENCOUNTER — OFFICE VISIT (OUTPATIENT)
Dept: GASTROENTEROLOGY | Facility: MEDICAL CENTER | Age: 72
End: 2022-09-12
Payer: COMMERCIAL

## 2022-09-12 VITALS
HEIGHT: 63 IN | OXYGEN SATURATION: 99 % | HEART RATE: 53 BPM | DIASTOLIC BLOOD PRESSURE: 82 MMHG | BODY MASS INDEX: 45.46 KG/M2 | WEIGHT: 256.6 LBS | SYSTOLIC BLOOD PRESSURE: 126 MMHG

## 2022-09-12 DIAGNOSIS — K21.9 GASTROESOPHAGEAL REFLUX DISEASE WITHOUT ESOPHAGITIS: Primary | ICD-10-CM

## 2022-09-12 DIAGNOSIS — K59.00 CONSTIPATION, UNSPECIFIED CONSTIPATION TYPE: ICD-10-CM

## 2022-09-12 DIAGNOSIS — R13.10 DYSPHAGIA, UNSPECIFIED TYPE: ICD-10-CM

## 2022-09-12 DIAGNOSIS — Z12.11 COLON CANCER SCREENING: ICD-10-CM

## 2022-09-12 PROCEDURE — 99214 OFFICE O/P EST MOD 30 MIN: CPT | Performed by: INTERNAL MEDICINE

## 2022-09-12 PROCEDURE — 3074F SYST BP LT 130 MM HG: CPT | Performed by: INTERNAL MEDICINE

## 2022-09-12 PROCEDURE — 1160F RVW MEDS BY RX/DR IN RCRD: CPT | Performed by: INTERNAL MEDICINE

## 2022-09-12 PROCEDURE — 3079F DIAST BP 80-89 MM HG: CPT | Performed by: INTERNAL MEDICINE

## 2022-09-12 RX ORDER — LINACLOTIDE 290 UG/1
290 CAPSULE, GELATIN COATED ORAL DAILY
Qty: 30 CAPSULE | Refills: 3 | Status: SHIPPED | OUTPATIENT
Start: 2022-09-12 | End: 2022-09-12

## 2022-09-12 RX ORDER — LINACLOTIDE 290 UG/1
290 CAPSULE, GELATIN COATED ORAL DAILY
Qty: 30 CAPSULE | Refills: 3 | Status: SHIPPED | OUTPATIENT
Start: 2022-09-12

## 2022-09-12 NOTE — PROGRESS NOTES
[facility] - Outpatient Follow-up Note  Tam[place] Paiz 73 y o  female MRN: [id number]  Encounter: [id number]          ASSESSMENT AND PLAN:  73 female with history of GERD and hypertension presents for follow up evaluation  1  Gastroesophageal reflux disease without esophagitis  2  Dysphagia, unspecified type  She has a history of chronic GERD symptoms for 20 years  Her 2019 EGD showed a small hiatal hernia but was otherwise normal   2020 esophagram also showed small hernia but otherwise normal   At her last office visit she had dysphagia and GERD symptoms despite higher doses of Nexium and she was ordered for EGD however this was not performed  She states her dysphagia has since resolved  She continues to take Nexium 20 mg daily with overall good control of her reflux  She is interested in stopping PPIs in the future and interested in discussing anti-reflux surgeries or procedures  I recommend having repeat EGD for up-to-date evaluation prior to referral for anti-reflux procedures  Depending on results of her EGD she would also need to have a pH and manometry testing done to ensure normal esophageal motility and to measure objective evidence of acid reflux off PPI  - esomeprazole (NexIUM) 20 mg capsule; Take 1 capsule (20 mg total) by mouth daily in the early morning  Dispense: 90 capsule; Refill: 1  - EGD; Future      3  Colon cancer screening  2019 Kimberly was negative  She reports undergoing a colonoscopy 20 years ago which he states was normal   She is due for colon cancer screening this year  I discussed the indication, risk and benefit of colonoscopy for colon cancer screening with her and she is agreeable to proceed  - Colonoscopy; Future    4  Constipation, unspecified constipation type  She has chronic constipation  She reports trying fiber supplements and Linzess in the past with no improvement    I recommend trying the higher dose of Linzess at 290 micro g daily   I discussed this can cause loose stools in the 1st 10-12 days of use  Recommended she resume fiber supplementation at half the standard dose given her bloating before and up titrate as tolerated  If she has no improvement on the Linzess consider initiation of Amitiza  - linaCLOtide (Linzess) [address]; Take 1 capsule by mouth in the morning  Dispense: 30 capsule; Refill: 3    ______________________________________________________________________    SUBJECTIVE:   73 female with history of GERD and hypertension presents for follow up evaluation  She was last seen in the GI office May 2022  At that time she had history of chronic reflux and dysphagia for many years  She had previously had an endoscopy in 2019 showing a small hiatal hernia otherwise normal   Esophagram in 2020 also showed small hiatal hernia otherwise no evidence of GERD or other abnormality  At her last office visit she was taking Nexium twice daily still with ongoing symptoms and recommended to repeat endoscopy for evaluation however this was not performed as it was denied by her insurance  She also had a history of chronic constipation for which she was prescribed Linzess 145 micro g daily  Interval history:  She uses Linzess 145 micro g and has improvement of her bowel habits with been turned constipation  Now she usually has a bowel movement every day but this can be small and difficult to pass stool  Sometimes she has a sense of complete evacuation  She has no rectal bleeding  She continues to take Nexium once daily for heartburn  Her dysphagia has resolved  Nexium usually controls her heartburn symptoms other than with certain food intake        She believes she underwent a colonoscopy 20 years ago which was normal  Cologuard 2019 negative       2019 EGD showed gastritis and hiatal hernia gastric biopsies were negative for H pylori  2020 esophagram shows small hiatal hernia no evidence of GERD  2020 abdominal ultrasound and HIDA scan are within normal limits    REVIEW OF SYSTEMS IS OTHERWISE NEGATIVE    Ten point review of systems is negative other than stated as per HPI    Historical Information   Past Medical History:   Diagnosis Date    GERD (gastroesophageal reflux disease)     Obesity      Past Surgical History:   Procedure Laterality Date    CARDIAC CATHETERIZATION  2020     SECTION      ESOPHAGOGASTRODUODENOSCOPY      OVARIAN CYST REMOVAL       Social History   Social History     Substance and Sexual Activity   Alcohol Use Yes     Social History     Substance and Sexual Activity   Drug Use Never     Social History     Tobacco Use   Smoking Status Never Smoker   Smokeless Tobacco Never Used     Family History   Problem Relation Age of Onset    Hypertension Mother     Arthritis Mother     Heart disease Father     No Known Problems Maternal Grandmother     No Known Problems Maternal Grandfather     No Known Problems Paternal Grandmother     No Known Problems Paternal Grandfather     No Known Problems Maternal Aunt     No Known Problems Maternal Aunt        Meds/Allergies       Current Outpatient Medications:     Ascorbic Acid (vitamin C) 1000 MG tablet    atorvastatin (LIPITOR) 40 mg tablet    b complex vitamins capsule    bepotastine besilate (BEPREVE) 1 5 % op soln    calcium carbonate (OS-SUSIE) 600 MG tablet    carvedilol (COREG) 12 5 mg tablet    Cequa 0 09 % SOLN    cetirizine (ZyrTEC) 10 mg tablet    Cholecalciferol (Vitamin D-3) 25 MCG (1000 UT) CAPS    Coenzyme Q10 (CoQ10) 100 MG CAPS    esomeprazole (NexIUM) 20 mg capsule    Krill Oil Omega-3 500 MG CAPS    linaCLOtide 145 MCG CAPS    Multiple Vitamin (Multi Vitamin Daily) TABS    Plant Sterols and Stanols (CHOLESTOFF PO)    triamcinolone (KENALOG) 0 5 % ointment    Turmeric 500 MG CAPS    valACYclovir (VALTREX) 500 mg tablet    Allergies   Allergen Reactions    Penicillins      dizziness    Seasonal Ic [Cholestatin] Sneezing and Eye Swelling     Does take zertec    Latex Rash           Objective     Blood pressure 126/82, pulse (!) 53, height 5' 3" (1 6 m), weight 116 kg (256 lb 9 6 oz), SpO2 99 %, not currently breastfeeding  There is no height or weight on file to calculate BMI  PHYSICAL EXAM:      General Appearance:   Alert, cooperative, no distress   HEENT:   Normocephalic, atraumatic, anicteric  Neck:  Supple, symmetrical, trachea midline   Lungs:   Clear to auscultation bilaterally; no rales, rhonchi or wheezing; respirations unlabored    Heart[de-identified]   Regular rate and rhythm; no murmur, rub, or gallop  Abdomen:   Soft, non-tender, non-distended; normal bowel sounds; no masses, no organomegaly    Genitalia:   Deferred    Rectal:   Deferred    Extremities:  No cyanosis, clubbing or edema    Pulses:  2+ and symmetric    Skin:  No jaundice, rashes, or lesions    Lymph nodes:  No palpable cervical lymphadenopathy        Lab Results:   No visits with results within 1 Day(s) from this visit     Latest known visit with results is:   Hospital Outpatient Visit on 06/30/2022   Component Date Value    A4C EF 06/30/2022 65     LVOT stroke volume 06/30/2022 102 12     LVOT stroke volume index 06/30/2022 45 20     LVIDd 06/30/2022 4 90     LVIDS 06/30/2022 3 40     IVSd 06/30/2022 1 20     LVPWd 06/30/2022 1 00     FS 06/30/2022 31     MV E' Tissue Velocity Se* 06/30/2022 10     E wave deceleration time 06/30/2022 221     MV Peak E Billy 06/30/2022 90     MV Peak A Billy 06/30/2022 0 72     AV LVOT peak gradient 06/30/2022 6     LVOT peak VTI 06/30/2022 29 5     LVOT peak billy 06/30/2022 1 26     RVID d 06/30/2022 4 5     LA size 06/30/2022 4 8     LA length (A2C) 06/30/2022 6 30     AMARJIT A4C 06/30/2022 18 7     RA 2D Volume 06/30/2022 43 0     RAA A4C 06/30/2022 14 3     LVOT diameter 06/30/2022 2 1     Aortic valve peak veloci* 06/30/2022 2 46     Ao VTI 06/30/2022 62 94     AV mean gradient 06/30/2022 14     LVOT mn grad 06/30/2022 3 0     AV peak gradient 06/30/2022 24     AV Deceleration Time 06/30/2022 2,558     AV regurgitation pressur* 06/30/2022 742     AV area by cont VTI 06/30/2022 1 6     AV area peak jaime 06/30/2022 1 8     MV stenosis pressure 1/2* 06/30/2022 64     MV valve area p 1/2 meth* 06/30/2022 3 44     MR PG 06/30/2022 37     Ao root 06/30/2022 3 20     Asc Ao 06/30/2022 3 1     AV peak gradient 06/30/2022 106     Aortic valve mean veloci* 06/30/2022 17 80     Mitral regurgitation pea* 06/30/2022 3 03     Left ventricular stroke * 06/30/2022 65 00     IVS 06/30/2022 1 2     LEFT VENTRICLE SYSTOLIC * 12/67/9392 47     LV DIASTOLIC VOLUME (MOD* 59/14/8354 113     Left Atrium Area-systoli* 06/30/2022 19     Left Atrium Area-systoli* 06/30/2022 26     LVSV, 2D 06/30/2022 65     LVOT area 06/30/2022 3 46     Dimensionless velociy in* 06/30/2022 0 51     AV valve area 06/30/2022 1 62          Radiology Results:   No results found

## 2022-10-17 ENCOUNTER — APPOINTMENT (OUTPATIENT)
Dept: LAB | Facility: CLINIC | Age: 72
End: 2022-10-17
Payer: COMMERCIAL

## 2022-10-17 DIAGNOSIS — D47.2 BICLONAL GAMMOPATHY: ICD-10-CM

## 2022-10-17 LAB
ALBUMIN SERPL BCP-MCNC: 3.1 G/DL (ref 3.5–5)
ALP SERPL-CCNC: 75 U/L (ref 46–116)
ALT SERPL W P-5'-P-CCNC: 26 U/L (ref 12–78)
ANION GAP SERPL CALCULATED.3IONS-SCNC: 5 MMOL/L (ref 4–13)
AST SERPL W P-5'-P-CCNC: 16 U/L (ref 5–45)
BASOPHILS # BLD AUTO: 0.05 THOUSANDS/ΜL (ref 0–0.1)
BASOPHILS NFR BLD AUTO: 1 % (ref 0–1)
BILIRUB SERPL-MCNC: 0.42 MG/DL (ref 0.2–1)
BUN SERPL-MCNC: 16 MG/DL (ref 5–25)
CALCIUM ALBUM COR SERPL-MCNC: 10.6 MG/DL (ref 8.3–10.1)
CALCIUM SERPL-MCNC: 9.9 MG/DL (ref 8.3–10.1)
CHLORIDE SERPL-SCNC: 109 MMOL/L (ref 96–108)
CO2 SERPL-SCNC: 27 MMOL/L (ref 21–32)
CREAT SERPL-MCNC: 1 MG/DL (ref 0.6–1.3)
EOSINOPHIL # BLD AUTO: 0.41 THOUSAND/ΜL (ref 0–0.61)
EOSINOPHIL NFR BLD AUTO: 6 % (ref 0–6)
ERYTHROCYTE [DISTWIDTH] IN BLOOD BY AUTOMATED COUNT: 14.7 % (ref 11.6–15.1)
GFR SERPL CREATININE-BSD FRML MDRD: 57 ML/MIN/1.73SQ M
GLUCOSE P FAST SERPL-MCNC: 89 MG/DL (ref 65–99)
HCT VFR BLD AUTO: 43.8 % (ref 34.8–46.1)
HGB BLD-MCNC: 13.6 G/DL (ref 11.5–15.4)
IGA SERPL-MCNC: 1180 MG/DL (ref 70–400)
IGG SERPL-MCNC: 1050 MG/DL (ref 700–1600)
IGM SERPL-MCNC: 30 MG/DL (ref 40–230)
IMM GRANULOCYTES # BLD AUTO: 0.02 THOUSAND/UL (ref 0–0.2)
IMM GRANULOCYTES NFR BLD AUTO: 0 % (ref 0–2)
LYMPHOCYTES # BLD AUTO: 1.44 THOUSANDS/ΜL (ref 0.6–4.47)
LYMPHOCYTES NFR BLD AUTO: 22 % (ref 14–44)
MCH RBC QN AUTO: 29.2 PG (ref 26.8–34.3)
MCHC RBC AUTO-ENTMCNC: 31.1 G/DL (ref 31.4–37.4)
MCV RBC AUTO: 94 FL (ref 82–98)
MONOCYTES # BLD AUTO: 0.58 THOUSAND/ΜL (ref 0.17–1.22)
MONOCYTES NFR BLD AUTO: 9 % (ref 4–12)
NEUTROPHILS # BLD AUTO: 4.13 THOUSANDS/ΜL (ref 1.85–7.62)
NEUTS SEG NFR BLD AUTO: 62 % (ref 43–75)
NRBC BLD AUTO-RTO: 0 /100 WBCS
PLATELET # BLD AUTO: 273 THOUSANDS/UL (ref 149–390)
PMV BLD AUTO: 9.7 FL (ref 8.9–12.7)
POTASSIUM SERPL-SCNC: 3.8 MMOL/L (ref 3.5–5.3)
PROT SERPL-MCNC: 8.2 G/DL (ref 6.4–8.4)
RBC # BLD AUTO: 4.65 MILLION/UL (ref 3.81–5.12)
SODIUM SERPL-SCNC: 141 MMOL/L (ref 135–147)
WBC # BLD AUTO: 6.63 THOUSAND/UL (ref 4.31–10.16)

## 2022-10-17 PROCEDURE — 80053 COMPREHEN METABOLIC PANEL: CPT

## 2022-10-17 PROCEDURE — 85025 COMPLETE CBC W/AUTO DIFF WBC: CPT

## 2022-10-17 PROCEDURE — 83521 IG LIGHT CHAINS FREE EACH: CPT

## 2022-10-17 PROCEDURE — 84165 PROTEIN E-PHORESIS SERUM: CPT

## 2022-10-17 PROCEDURE — 36415 COLL VENOUS BLD VENIPUNCTURE: CPT

## 2022-10-17 PROCEDURE — 82784 ASSAY IGA/IGD/IGG/IGM EACH: CPT

## 2022-10-18 LAB
KAPPA LC FREE SER-MCNC: 52.9 MG/L (ref 3.3–19.4)
KAPPA LC FREE/LAMBDA FREE SER: 1.61 {RATIO} (ref 0.26–1.65)
LAMBDA LC FREE SERPL-MCNC: 32.8 MG/L (ref 5.7–26.3)

## 2022-10-26 LAB — PROT PATTERN SERPL ELPH-IMP: NORMAL

## 2022-10-27 ENCOUNTER — TELEPHONE (OUTPATIENT)
Dept: HEMATOLOGY ONCOLOGY | Facility: CLINIC | Age: 72
End: 2022-10-27

## 2022-10-27 DIAGNOSIS — D47.2 BICLONAL GAMMOPATHY: Primary | ICD-10-CM

## 2022-10-27 NOTE — TELEPHONE ENCOUNTER
Received vm from patient, "Hi  Yes, I just got a call from Seema Gannon  My name is [employer]  15 Three 1950 is my birthday, he is ordering some blood work for and I'm going to go tomorrow but I need to know if it's on an empty stomach, does a fasting if so that I can be prepared to do it tomorrow  Please give me a call, [phone] "    Reviewed patient lab is nonfasting and lab script is in patient chart  Left vm with RN call back number and hours of operation to further discuss

## 2022-10-27 NOTE — TELEPHONE ENCOUNTER
Called patient  Discussed her recent labs with her  She has lingering hypercalcemia with corrected calcium 10 6  No anemia, no kidney dysfunction  Her bone survey was unrevealing  Her M spike was 1 2 IgA kappa  This is significantly increased compared to last testing  I provided possible appointment today, or tomorrow instead of her appointment next week 11/3  She is unable to make appointments earlier  I discussed the importance of a bone marrow biopsy and provided some patient Education regarding this  She will move forward with this  We will also obtain a CT myelogram   She will also obtain beta 2 microglobulin testing at next blood draw  She will follow-up with Dr Vamsi Ricci 1 week after bone marrow biopsy  Patient appreciated call  Jaycee Kelly, can you schedule CT myelogram?  I will provide you [facility] form  Can you also make appointment with Dr Vamsi Ricci 1 week post bone marrow biopsy?

## 2022-10-28 ENCOUNTER — APPOINTMENT (OUTPATIENT)
Dept: LAB | Facility: CLINIC | Age: 72
End: 2022-10-28
Payer: COMMERCIAL

## 2022-10-28 ENCOUNTER — TELEPHONE (OUTPATIENT)
Dept: HEMATOLOGY ONCOLOGY | Facility: CLINIC | Age: 72
End: 2022-10-28

## 2022-10-28 DIAGNOSIS — D47.2 BICLONAL GAMMOPATHY: ICD-10-CM

## 2022-10-28 PROCEDURE — 82232 ASSAY OF BETA-2 PROTEIN: CPT

## 2022-10-28 PROCEDURE — 36415 COLL VENOUS BLD VENIPUNCTURE: CPT

## 2022-10-28 NOTE — TELEPHONE ENCOUNTER
I was informed that patient left the below message over teams  She was calling our RN Gerry Castillo  This is copy and pasted from my e-mail:      "Mirella Deutsch  This is [employer]  My [birth year] is [birth year]  I was there to see Geronimo Wells  She gave me the number to be able to reach you  I'm trying really hard to get an appointment for the test that Geronimo Wells asked me to take care of, and I really am having a very difficult time  Please call me back and maybe we can figure out how to fix this  My number is [phone]  Thanks "    I attempted to call patient  No answer  Left voice message to call us back when she has a chance  Left hope line number and also stated she could call our nurses number if needed  Sarah Phan

## 2022-10-28 NOTE — TELEPHONE ENCOUNTER
Called patient back  Next available bone marrow biopsy appointment at [facility] was 12/07/2022  This is too far out  Ideally patient should have this testing done in 2-3 weeks  Please call IR to see if they can accommodate this sooner  She is willing to travel to [place], other campus is if needed  She also needs a CT myelogram scheduled  After we have bone marrow biopsy appointment, patient needs to have a 1 week follow-up post biopsy with Dr Jefferson Parisi  Please schedule this above for patient

## 2022-10-28 NOTE — TELEPHONE ENCOUNTER
CALL RETURN FORM   Reason for patient call? Patient is returning Zohra's call    Patient's primary oncologist? Valerie Torrez     Name of person the patient was calling for?  Valerie Torrez      Any additional information to add, if applicable? n/a   Informed patient that the message will be forwarded to the team and someone will get back to them as soon as possible    Did you relay this information to the patient?  yes, patient is requesting to be called back at [phone]

## 2022-10-31 LAB — B2 MICROGLOB SERPL-MCNC: 2.6 MG/L (ref 0.6–2.4)

## 2022-11-09 ENCOUNTER — TELEPHONE (OUTPATIENT)
Dept: HEMATOLOGY ONCOLOGY | Facility: CLINIC | Age: 72
End: 2022-11-09

## 2022-11-09 NOTE — TELEPHONE ENCOUNTER
received call from IR in regards to [facility] form needed for biopsy  Left vm on line with call back number and reviewed form is uploaded into patient media section

## 2022-11-10 ENCOUNTER — HOSPITAL ENCOUNTER (OUTPATIENT)
Dept: CT IMAGING | Facility: CLINIC | Age: 72
Discharge: HOME | End: 2022-11-10

## 2022-11-10 DIAGNOSIS — D47.2 BICLONAL GAMMOPATHY: ICD-10-CM

## 2022-11-10 NOTE — PRE-PROCEDURE INSTRUCTIONS
Pre-procedure Instructions for Interventional Radiology  [address]  [place] [address] [phone]    You are scheduled for a/an Bone Marrow Biopsy  On Thursday 11/17/22  Your tentative arrival time is 0830  Short stay will notify you the day before your procedure with the exact arrival time and the location to arrive  To prepare for your procedure:  1  Please arrange for someone to drive you home after the procedure and stay with you until the next morning if you are instructed to do so  This is typically for patients receiving some type of sedative or anesthetic for the procedure  2  DO NOT EAT OR DRINK ANYTHING after midnight on the evening before your procedure including candy & gum   3  ONLY SIPS OF WATER with your medications are allowed on the morning of your procedure  4  TAKE ALL OF YOUR REGULAR MEDICATIONS THE MORNING OF YOUR PROCEDURE with sips of water! We may call you to stop some of your blood sugar, blood pressure and blood thinning medications depending on the procedure  Please take all of these medications unless we instruct you to stop them  5  If you have an allergy to x-ray dye, please contact Interventional Radiology for an x-ray dye preparation which usually consists of an oral steroid and Benadryl  The day of your procedure:  1  Bring a list of the medications you take at home  2  Bring medications you take for breathing problems (such as inhalers), medications for chest pain, or both  3  Bring a case for your glasses or contacts  4  Bring your insurance card and a form of photo ID   5  Please leave all valuables such as credit cards and jewelry at home  6  Report to the registration desk in the main lobby at the [facility], [facility]  Ask to be directed to [facility]  7  While your procedure is being performed, your family may wait in the Radiology Waiting Room on the 1st floor in Radiology  if they need to leave, they may provide a number to be called following the procedure  8  Be prepared to stay overnight just in case  Sometimes procedures will indicate the need for further observation or treatment  9  If you are scheduled for a follow-up visit with the Interventional Radiologist after your procedure, you will be called with a date and time  10  Covid vaccine and boosters completed      Special Instructions (Medications to stop taking before your procedure etc ):

## 2022-11-11 ENCOUNTER — TELEPHONE (OUTPATIENT)
Dept: HEMATOLOGY ONCOLOGY | Facility: CLINIC | Age: 72
End: 2022-11-11

## 2022-11-11 NOTE — TELEPHONE ENCOUNTER
Spoke with patient and advised her an appointment with Dr Aldo Kumar has been scheduled for 12/7/2022 at 9:40 to go over her BmBx results  Patient voiced understanding       ----- Message from Andrew Foote PA-C sent at 11/11/2022  3:01 PM EST -----  Hello! :)    This patient is a likely new diagnosis of multiple myeloma  Can you schedule patient an appointment for 1 week after her bone marrow biopsy 11/17? She needs to see Dr Aldo Kumar      Thanks,    [employer]

## 2022-11-16 ENCOUNTER — OFFICE VISIT (OUTPATIENT)
Dept: GASTROENTEROLOGY | Facility: MEDICAL CENTER | Age: 72
End: 2022-11-16

## 2022-11-16 VITALS
TEMPERATURE: 96.8 F | DIASTOLIC BLOOD PRESSURE: 92 MMHG | SYSTOLIC BLOOD PRESSURE: 140 MMHG | WEIGHT: 255 LBS | BODY MASS INDEX: 45.17 KG/M2

## 2022-11-16 DIAGNOSIS — K59.00 CONSTIPATION, UNSPECIFIED CONSTIPATION TYPE: Primary | ICD-10-CM

## 2022-11-16 DIAGNOSIS — Z12.11 COLON CANCER SCREENING: ICD-10-CM

## 2022-11-16 DIAGNOSIS — K21.9 GASTROESOPHAGEAL REFLUX DISEASE, UNSPECIFIED WHETHER ESOPHAGITIS PRESENT: ICD-10-CM

## 2022-11-16 DIAGNOSIS — R10.30 LOWER ABDOMINAL PAIN: ICD-10-CM

## 2022-11-16 RX ORDER — POLYETHYLENE GLYCOL 3350, SODIUM SULFATE ANHYDROUS, SODIUM BICARBONATE, SODIUM CHLORIDE, POTASSIUM CHLORIDE 236; 22.74; 6.74; 5.86; 2.97 G/4L; G/4L; G/4L; G/4L; G/4L
4000 POWDER, FOR SOLUTION ORAL ONCE
Qty: 4000 ML | Refills: 0 | Status: SHIPPED | OUTPATIENT
Start: 2022-11-16 | End: 2022-11-16

## 2022-11-16 RX ORDER — DICYCLOMINE HCL 20 MG
20 TABLET ORAL EVERY 6 HOURS
Qty: 120 TABLET | Refills: 1 | Status: SHIPPED | OUTPATIENT
Start: 2022-11-16

## 2022-11-16 NOTE — PATIENT INSTRUCTIONS
High Fiber Diet   AMBULATORY CARE:   A high-fiber diet  includes foods that have a high amount of fiber  Fiber is the part of fruits, vegetables, and grains that is not broken down by your body  Fiber keeps your bowel movements regular  Fiber can also help lower your cholesterol level, control blood sugar in people with diabetes, and relieve constipation  Fiber can also help you control your weight because it helps you feel full faster  Most adults should eat 25 to 35 grams of fiber each day  Talk to your dietitian or healthcare provider about the amount of fiber you need  Good sources of fiber:       Foods with at least 4 grams of fiber per serving:      ? to ½ cup of high-fiber cereal (check the nutrition label on the box)    ½ cup of blackberries or raspberries    4 dried prunes    1 cooked artichoke    ½ cup of cooked legumes, such as lentils, or red, kidney, and callejas beans    Foods with 1 to 3 grams of fiber per servin slice of whole-wheat, pumpernickel, or rye bread    ½ cup of cooked brown rice    4 whole-wheat crackers    1 cup of oatmeal    ½ cup of cereal with 1 to 3 grams of fiber per serving (check the nutrition label on the box)    1 small piece of fruit, such as an apple, banana, pear, kiwi, or orange    3 dates    ½ cup of canned apricots, fruit cocktail, peaches, or pears    ½ cup of raw or cooked vegetables, such as carrots, cauliflower, cabbage, spinach, squash, or corn    Ways that you can increase fiber in your diet:   Choose brown or wild rice instead of white rice  Use whole wheat flour in recipes instead of white or all-purpose flour  Add beans and peas to casseroles or soups  Choose fresh fruit and vegetables with peels or skins on instead of juices  Other diet guidelines to follow: Add fiber to your diet slowly  You may have abdominal discomfort, bloating, and gas if you add fiber to your diet too quickly       Drink plenty of liquids as you add fiber to your diet   You may have nausea or develop constipation if you do not drink enough water  Ask how much liquid to drink each day and which liquids are best for you  © Copyright [employer] 2022 Information is for End User's use only and may not be sold, redistributed or otherwise used for commercial purposes  All illustrations and images included in CareNotes® are the copyrighted property of CRISTINA EVANS Splore Meryl  or [facility] Racheal Kramer   The above information is an  only  It is not intended as medical advice for individual conditions or treatments  Talk to your doctor, nurse or pharmacist before following any medical regimen to see if it is safe and effective for you

## 2022-11-16 NOTE — PROGRESS NOTES
[facility] - Outpatient Follow-up Note  Bong Martines 73 y o  female MRN: [id number]  Encounter: [id number]          ASSESSMENT AND PLAN:    72-year-old female with history of GERD hypertension who presents for follow-up evaluation  1  Constipation, unspecified constipation type  2  Lower abdominal pain  She is history of chronic constipation  At her last office visit she was recommended to start Linzess 290 micro g daily  She reports no effect with the medication followed by watery diarrhea  She continues to have lower abdominal pain  I reviewed her recent CT scan and she does have a stool burden throughout the colon  We discussed symptoms may be related to her chronic constipation, intestinal spasm  I recommend doing a mini bowel prep” at home with MiraLax/Gatorade and then starting Linzess 145 micro g daily after  She will continue constipation management with adequate hydration and high-fiber diet  If Ashwin Maha is not sufficient she may be increased again to the 290 micro g dose or transition to Amitiza  Her abdominal pain may be related to abdominal spasm and I  recommend she trial Bentyl as needed  She also reports a history of a laparoscopy many years ago was told that she had “scar tissue” related to endometriosis and pain may be related to this as well  - linaCLOtide 145 MCG CAPS; Take 1 capsule (145 mcg total) by mouth in the morning  Dispense: 30 capsule; Refill: 3  - dicyclomine (BENTYL) 20 mg tablet; Take 1 tablet (20 mg total) by mouth every 6 (six) hours As needed for abdominal pain  Dispense: 120 tablet; Refill: 1    3  Gastroesophageal reflux disease, unspecified whether esophagitis present  She is history of chronic GERD  Her symptoms are currently controlled on Nexium daily  EGD will be performed at the time of her colonoscopy for screening for Felix's esophagus  4  Colon cancer screening  2019 Cologuard is negative    She reports her last colonoscopy was 20 years ago and she is due at this time  I discussed the indication, risk and benefit of colonoscopy with her today and she is agreeable to proceed  ______________________________________________________________________    SUBJECTIVE:  42-year-old female with history of GERD hypertension who presents for follow-up evaluation  She was last seen in the GI office September 2022  At that time she had a history of chronic GERD symptoms for 20 years  She previously had an endoscopy in 2019 showing a small hiatal hernia and otherwise normal   2020 esophagram also showed small hiatal hernia otherwise normal   At her last visit her dysphagia had resolved and she continue to take Nexium 20 mg daily with overall good control of her reflux  She was interested anti-reflux surgeries or procedures was recommended to have repeat EGD for up-to-date evaluation  She also has a history of constipation and had attempted fiber supplements, Linzess in the past with no improvement  She was recommended to start Linzess at a higher dose of 290 micro g daily  She was also due for colonoscopy for screening purposes as 2019 Cologuard was negative and she reports a prior colonoscopy 20 years ago which she states was normal     Interval history:  She reports trying Linzess for 4 days  She had no effect in the 1st 3 days followed by diarrhea and then discontinued the medication  She continues to have lower abdominal pain constant throughout the day  She has abdominal bloating and lower abdominal pressure  This is slightly relieved after bowel movement  She has small hard to pass bowel movement associated with straining daily but sometimes can go 3-4 days without a bowel movement  She is currently not using any medication for her bowel regimens  She denies rectal bleeding    Her symptoms of GERD still controlled on daily Nexium      She believes she underwent a colonoscopy 20 years ago which was normal  Cologuard 2019 negative         EGD showed gastritis and hiatal hernia gastric biopsies were negative for H pylori   esophagram shows small hiatal hernia no evidence of GERD   abdominal ultrasound and HIDA scan are within normal limits        REVIEW OF SYSTEMS IS OTHERWISE NEGATIVE    10 point review of systems is negative other than stated as per hpi    Historical Information   Past Medical History:   Diagnosis Date   • GERD (gastroesophageal reflux disease)    • Obesity      Past Surgical History:   Procedure Laterality Date   • CARDIAC CATHETERIZATION  2020   •  SECTION     • ESOPHAGOGASTRODUODENOSCOPY     • OVARIAN CYST REMOVAL       Social History   Social History     Substance and Sexual Activity   Alcohol Use Yes     Social History     Substance and Sexual Activity   Drug Use Never     Social History     Tobacco Use   Smoking Status Never   Smokeless Tobacco Never     Family History   Problem Relation Age of Onset   • Hypertension Mother    • Arthritis Mother    • Heart disease Father    • No Known Problems Maternal Grandmother    • No Known Problems Maternal Grandfather    • No Known Problems Paternal Grandmother    • No Known Problems Paternal Grandfather    • No Known Problems Maternal Aunt    • No Known Problems Maternal Aunt        Meds/Allergies       Current Outpatient Medications:   •  Ascorbic Acid (vitamin C) 1000 MG tablet  •  b complex vitamins capsule  •  bepotastine besilate (BEPREVE) 1 5 % op soln  •  calcium carbonate (OS-SUSIE) 600 MG tablet  •  carvedilol (COREG) 12 5 mg tablet  •  Cequa 0 09 % SOLN  •  cetirizine (ZyrTEC) 10 mg tablet  •  Cholecalciferol (Vitamin D-3) 25 MCG (1000 UT) CAPS  •  Coenzyme Q10 (CoQ10) 100 MG CAPS  •  esomeprazole (NexIUM) 20 mg capsule  •  Krill Oil Omega-3 500 MG CAPS  •  Multiple Vitamin (Multi Vitamin Daily) TABS  •  Plant Sterols and Stanols (CHOLESTOFF PO)  •  triamcinolone (KENALOG) 0 5 % ointment  •  Turmeric 500 MG CAPS  • atorvastatin (LIPITOR) 40 mg tablet  •  linaCLOtide (Linzess) 290 MCG CAPS  •  valACYclovir (VALTREX) 500 mg tablet    Allergies   Allergen Reactions   • Penicillins      dizziness   • Seasonal Ic [Cholestatin] Sneezing and Eye Swelling     Does take zertec   • Latex Rash           Objective     Blood pressure 140/92, temperature (!) 96 8 °F (36 °C), temperature source Tympanic, weight 116 kg (255 lb), not currently breastfeeding  Body mass index is 45 17 kg/m²  PHYSICAL EXAM:      General Appearance:   Alert, cooperative, no distress   HEENT:   Normocephalic, atraumatic, anicteric  Neck:  Supple, symmetrical, trachea midline   Lungs:   Clear to auscultation bilaterally; no rales, rhonchi or wheezing; respirations unlabored    Heart[de-identified]   Regular rate and rhythm; no murmur, rub, or gallop  Abdomen:   Soft, mild lower abdominal tenderness to deep palpation without rebound or guarding, non-distended; normal bowel sounds; no masses, no organomegaly    Genitalia:   Deferred    Rectal:   Deferred    Extremities:  No cyanosis, clubbing or edema    Pulses:  2+ and symmetric    Skin:  No jaundice, rashes, or lesions    Lymph nodes:  No palpable cervical lymphadenopathy        Lab Results:   No visits with results within 1 Day(s) from this visit  Latest known visit with results is:   Appointment on 10/28/2022   Component Date Value   • Beta-2 Microglobulin 10/28/2022 2 6 (H)          Radiology Results:   CT low dose whole body myeloma scan    Result Date: 11/11/2022  Narrative: WHOLE BODY LOW DOSE CT WITHOUT IV CONTRAST (MULTIPLE MYELOMA PROTOCOL ) INDICATION:   D47 2: Monoclonal gammopathy  Concern for IgA multiple myeloma  Rule out bone lesion  Biclonal gammopathy  COMPARISON:  Plain film bone survey from 4/29/2022   TECHNIQUE: Low radiation dose thin section CT examination of the whole body was performed without intravenous or oral contrast according to a protocol specifically designed to evaluate for possible multiple myeloma  Scan included the head, cervical spine, chest, abdomen, pelvis, as well as the humeri and femurs  Axial, sagittal, and coronal 2D reformatted images were created from the source data and submitted for interpretation  Evaluation for pathology in nonosseous structures is limited  Radiation dose length product (DLP) for this visit:  1711 mGy-cm   This examination, like all CT scans performed in the [facility], was performed utilizing techniques to minimize radiation dose exposure, including the use of iterative reconstruction and automated exposure control  Enteric contrast was not administered  FINDINGS: Reporting of Bone findings below are based on the 2014 International Myeloma Working Group (IMWG) recommendations  BONE FINDINGS: SUSPICIOUS OSTEOLYTIC LESIONS: None  (There are several small lucent lesion in the calvarium, for example series 301 image 53, posterior left calvarium  These demonstrate fatty density centrally, therefore are not suspicious for myeloma ) VERTEBRAL COMPRESSION FRACTURES: None  PERIPHERAL MEDULLARY PATTERN: Fatty pattern  VISUALIZED BRAIN: No acute abnormalities are seen  HEAD/NECK: Unremarkable  CHEST LUNGS:  Lungs are clear  There is no tracheal or endobronchial lesion  PLEURA:  Unremarkable  HEART/GREAT VESSELS: Heart is unremarkable for patient's age  No thoracic aortic aneurysm  MEDIASTINUM AND SCOTT:  Unremarkable  ABDOMEN LIVER/BILIARY TREE:  Unremarkable  GALLBLADDER:  No calcified gallstones  No pericholecystic inflammatory change  SPLEEN:  Unremarkable  PANCREAS:  Unremarkable  ADRENAL GLANDS:  There is a 2 5 x 1 2 cm left adrenal adenoma  KIDNEYS/URETERS:  Unremarkable  No hydronephrosis  STOMACH AND BOWEL:  Unremarkable  APPENDIX:  No findings to suggest appendicitis  ABDOMINOPELVIC CAVITY:  No ascites  No pneumoperitoneum  No lymphadenopathy  VESSELS:  Unremarkable for patient's age   PELVIS REPRODUCTIVE ORGANS:  Unremarkable for patient's age  URINARY BLADDER:  Unremarkable  ABDOMINAL WALL/INGUINAL REGIONS:  Unremarkable  Impression: WHOLE BODY LOW DOSE CT FOR EVALUATION OF MULTIPLE MYELOMA: OSTEOLYTIC LESIONS: None  PROBABLY MALIGNANT VERTEBRAL FRACTURE: None   SUSPICIOUS PERIPHERAL MEDULLARY PATTERN: No  Workstation performed: [id number]

## 2022-11-16 NOTE — H&P (VIEW-ONLY)
[facility] - Outpatient Follow-up Note  Bong Martines 73 y o  female MRN: [id number]  Encounter: [id number]          ASSESSMENT AND PLAN:    63-year-old female with history of GERD hypertension who presents for follow-up evaluation  1  Constipation, unspecified constipation type  2  Lower abdominal pain  She is history of chronic constipation  At her last office visit she was recommended to start Linzess 290 micro g daily  She reports no effect with the medication followed by watery diarrhea  She continues to have lower abdominal pain  I reviewed her recent CT scan and she does have a stool burden throughout the colon  We discussed symptoms may be related to her chronic constipation, intestinal spasm  I recommend doing a mini bowel prep” at home with MiraLax/Gatorade and then starting Linzess 145 micro g daily after  She will continue constipation management with adequate hydration and high-fiber diet  If Ashwin Maha is not sufficient she may be increased again to the 290 micro g dose or transition to Amitiza  Her abdominal pain may be related to abdominal spasm and I  recommend she trial Bentyl as needed  She also reports a history of a laparoscopy many years ago was told that she had “scar tissue” related to endometriosis and pain may be related to this as well  - linaCLOtide 145 MCG CAPS; Take 1 capsule (145 mcg total) by mouth in the morning  Dispense: 30 capsule; Refill: 3  - dicyclomine (BENTYL) 20 mg tablet; Take 1 tablet (20 mg total) by mouth every 6 (six) hours As needed for abdominal pain  Dispense: 120 tablet; Refill: 1    3  Gastroesophageal reflux disease, unspecified whether esophagitis present  She is history of chronic GERD  Her symptoms are currently controlled on Nexium daily  EGD will be performed at the time of her colonoscopy for screening for Felix's esophagus  4  Colon cancer screening  2019 Cologuard is negative    She reports her last colonoscopy was 20 years ago and she is due at this time  I discussed the indication, risk and benefit of colonoscopy with her today and she is agreeable to proceed  ______________________________________________________________________    SUBJECTIVE:  79-year-old female with history of GERD hypertension who presents for follow-up evaluation  She was last seen in the GI office September 2022  At that time she had a history of chronic GERD symptoms for 20 years  She previously had an endoscopy in 2019 showing a small hiatal hernia and otherwise normal   2020 esophagram also showed small hiatal hernia otherwise normal   At her last visit her dysphagia had resolved and she continue to take Nexium 20 mg daily with overall good control of her reflux  She was interested anti-reflux surgeries or procedures was recommended to have repeat EGD for up-to-date evaluation  She also has a history of constipation and had attempted fiber supplements, Linzess in the past with no improvement  She was recommended to start Linzess at a higher dose of 290 micro g daily  She was also due for colonoscopy for screening purposes as 2019 Cologuard was negative and she reports a prior colonoscopy 20 years ago which she states was normal     Interval history:  She reports trying Linzess for 4 days  She had no effect in the 1st 3 days followed by diarrhea and then discontinued the medication  She continues to have lower abdominal pain constant throughout the day  She has abdominal bloating and lower abdominal pressure  This is slightly relieved after bowel movement  She has small hard to pass bowel movement associated with straining daily but sometimes can go 3-4 days without a bowel movement  She is currently not using any medication for her bowel regimens  She denies rectal bleeding    Her symptoms of GERD still controlled on daily Nexium      She believes she underwent a colonoscopy 20 years ago which was normal  Cologuard 2019 negative         EGD showed gastritis and hiatal hernia gastric biopsies were negative for H pylori   esophagram shows small hiatal hernia no evidence of GERD   abdominal ultrasound and HIDA scan are within normal limits        REVIEW OF SYSTEMS IS OTHERWISE NEGATIVE    10 point review of systems is negative other than stated as per hpi    Historical Information   Past Medical History:   Diagnosis Date   • GERD (gastroesophageal reflux disease)    • Obesity      Past Surgical History:   Procedure Laterality Date   • CARDIAC CATHETERIZATION  2020   •  SECTION     • ESOPHAGOGASTRODUODENOSCOPY     • OVARIAN CYST REMOVAL       Social History   Social History     Substance and Sexual Activity   Alcohol Use Yes     Social History     Substance and Sexual Activity   Drug Use Never     Social History     Tobacco Use   Smoking Status Never   Smokeless Tobacco Never     Family History   Problem Relation Age of Onset   • Hypertension Mother    • Arthritis Mother    • Heart disease Father    • No Known Problems Maternal Grandmother    • No Known Problems Maternal Grandfather    • No Known Problems Paternal Grandmother    • No Known Problems Paternal Grandfather    • No Known Problems Maternal Aunt    • No Known Problems Maternal Aunt        Meds/Allergies       Current Outpatient Medications:   •  Ascorbic Acid (vitamin C) 1000 MG tablet  •  b complex vitamins capsule  •  bepotastine besilate (BEPREVE) 1 5 % op soln  •  calcium carbonate (OS-SUSIE) 600 MG tablet  •  carvedilol (COREG) 12 5 mg tablet  •  Cequa 0 09 % SOLN  •  cetirizine (ZyrTEC) 10 mg tablet  •  Cholecalciferol (Vitamin D-3) 25 MCG (1000 UT) CAPS  •  Coenzyme Q10 (CoQ10) 100 MG CAPS  •  esomeprazole (NexIUM) 20 mg capsule  •  Krill Oil Omega-3 500 MG CAPS  •  Multiple Vitamin (Multi Vitamin Daily) TABS  •  Plant Sterols and Stanols (CHOLESTOFF PO)  •  triamcinolone (KENALOG) 0 5 % ointment  •  Turmeric 500 MG CAPS  • atorvastatin (LIPITOR) 40 mg tablet  •  linaCLOtide (Linzess) 290 MCG CAPS  •  valACYclovir (VALTREX) 500 mg tablet    Allergies   Allergen Reactions   • Penicillins      dizziness   • Seasonal Ic [Cholestatin] Sneezing and Eye Swelling     Does take zertec   • Latex Rash           Objective     Blood pressure 140/92, temperature (!) 96 8 °F (36 °C), temperature source Tympanic, weight 116 kg (255 lb), not currently breastfeeding  Body mass index is 45 17 kg/m²  PHYSICAL EXAM:      General Appearance:   Alert, cooperative, no distress   HEENT:   Normocephalic, atraumatic, anicteric  Neck:  Supple, symmetrical, trachea midline   Lungs:   Clear to auscultation bilaterally; no rales, rhonchi or wheezing; respirations unlabored    Heart[de-identified]   Regular rate and rhythm; no murmur, rub, or gallop  Abdomen:   Soft, mild lower abdominal tenderness to deep palpation without rebound or guarding, non-distended; normal bowel sounds; no masses, no organomegaly    Genitalia:   Deferred    Rectal:   Deferred    Extremities:  No cyanosis, clubbing or edema    Pulses:  2+ and symmetric    Skin:  No jaundice, rashes, or lesions    Lymph nodes:  No palpable cervical lymphadenopathy        Lab Results:   No visits with results within 1 Day(s) from this visit  Latest known visit with results is:   Appointment on 10/28/2022   Component Date Value   • Beta-2 Microglobulin 10/28/2022 2 6 (H)          Radiology Results:   CT low dose whole body myeloma scan    Result Date: 11/11/2022  Narrative: WHOLE BODY LOW DOSE CT WITHOUT IV CONTRAST (MULTIPLE MYELOMA PROTOCOL ) INDICATION:   D47 2: Monoclonal gammopathy  Concern for IgA multiple myeloma  Rule out bone lesion  Biclonal gammopathy  COMPARISON:  Plain film bone survey from 4/29/2022   TECHNIQUE: Low radiation dose thin section CT examination of the whole body was performed without intravenous or oral contrast according to a protocol specifically designed to evaluate for possible multiple myeloma  Scan included the head, cervical spine, chest, abdomen, pelvis, as well as the humeri and femurs  Axial, sagittal, and coronal 2D reformatted images were created from the source data and submitted for interpretation  Evaluation for pathology in nonosseous structures is limited  Radiation dose length product (DLP) for this visit:  1711 mGy-cm   This examination, like all CT scans performed in the [facility], was performed utilizing techniques to minimize radiation dose exposure, including the use of iterative reconstruction and automated exposure control  Enteric contrast was not administered  FINDINGS: Reporting of Bone findings below are based on the 2014 International Myeloma Working Group (IMWG) recommendations  BONE FINDINGS: SUSPICIOUS OSTEOLYTIC LESIONS: None  (There are several small lucent lesion in the calvarium, for example series 301 image 53, posterior left calvarium  These demonstrate fatty density centrally, therefore are not suspicious for myeloma ) VERTEBRAL COMPRESSION FRACTURES: None  PERIPHERAL MEDULLARY PATTERN: Fatty pattern  VISUALIZED BRAIN: No acute abnormalities are seen  HEAD/NECK: Unremarkable  CHEST LUNGS:  Lungs are clear  There is no tracheal or endobronchial lesion  PLEURA:  Unremarkable  HEART/GREAT VESSELS: Heart is unremarkable for patient's age  No thoracic aortic aneurysm  MEDIASTINUM AND SCOTT:  Unremarkable  ABDOMEN LIVER/BILIARY TREE:  Unremarkable  GALLBLADDER:  No calcified gallstones  No pericholecystic inflammatory change  SPLEEN:  Unremarkable  PANCREAS:  Unremarkable  ADRENAL GLANDS:  There is a 2 5 x 1 2 cm left adrenal adenoma  KIDNEYS/URETERS:  Unremarkable  No hydronephrosis  STOMACH AND BOWEL:  Unremarkable  APPENDIX:  No findings to suggest appendicitis  ABDOMINOPELVIC CAVITY:  No ascites  No pneumoperitoneum  No lymphadenopathy  VESSELS:  Unremarkable for patient's age   PELVIS REPRODUCTIVE ORGANS:  Unremarkable for patient's age  URINARY BLADDER:  Unremarkable  ABDOMINAL WALL/INGUINAL REGIONS:  Unremarkable  Impression: WHOLE BODY LOW DOSE CT FOR EVALUATION OF MULTIPLE MYELOMA: OSTEOLYTIC LESIONS: None  PROBABLY MALIGNANT VERTEBRAL FRACTURE: None   SUSPICIOUS PERIPHERAL MEDULLARY PATTERN: No  Workstation performed: [id number]

## 2022-11-17 ENCOUNTER — HOSPITAL ENCOUNTER (OUTPATIENT)
Dept: RADIOLOGY | Facility: HOSPITAL | Age: 72
Discharge: HOME | End: 2022-11-17

## 2022-11-17 VITALS
DIASTOLIC BLOOD PRESSURE: 84 MMHG | SYSTOLIC BLOOD PRESSURE: 183 MMHG | WEIGHT: 255 LBS | BODY MASS INDEX: 45.18 KG/M2 | TEMPERATURE: 97.8 F | RESPIRATION RATE: 16 BRPM | OXYGEN SATURATION: 98 % | HEART RATE: 80 BPM | HEIGHT: 63 IN

## 2022-11-17 DIAGNOSIS — D47.2 BICLONAL GAMMOPATHY: ICD-10-CM

## 2022-11-17 DIAGNOSIS — E78.01 FAMILIAL HYPERCHOLESTEROLEMIA: ICD-10-CM

## 2022-11-17 LAB
ERYTHROCYTE [DISTWIDTH] IN BLOOD BY AUTOMATED COUNT: 14.5 % (ref 11.6–15.1)
HCT VFR BLD AUTO: 41.1 % (ref 34.8–46.1)
HGB BLD-MCNC: 13.4 G/DL (ref 11.5–15.4)
MCH RBC QN AUTO: 30.3 PG (ref 26.8–34.3)
MCHC RBC AUTO-ENTMCNC: 32.6 G/DL (ref 31.4–37.4)
MCV RBC AUTO: 93 FL (ref 82–98)
NRBC BLD AUTO-RTO: 0 /100 WBCS
PLATELET # BLD AUTO: 257 THOUSANDS/UL (ref 149–390)
PMV BLD AUTO: 9.1 FL (ref 8.9–12.7)
RBC # BLD AUTO: 4.42 MILLION/UL (ref 3.81–5.12)
WBC # BLD AUTO: 7.08 THOUSAND/UL (ref 4.31–10.16)

## 2022-11-17 RX ORDER — LIDOCAINE HYDROCHLORIDE 10 MG/ML
INJECTION, SOLUTION EPIDURAL; INFILTRATION; INTRACAUDAL; PERINEURAL AS NEEDED
Status: COMPLETED | OUTPATIENT
Start: 2022-11-17 | End: 2022-11-17

## 2022-11-17 RX ORDER — FENTANYL CITRATE 50 UG/ML
INJECTION, SOLUTION INTRAMUSCULAR; INTRAVENOUS AS NEEDED
Status: COMPLETED | OUTPATIENT
Start: 2022-11-17 | End: 2022-11-17

## 2022-11-17 RX ORDER — HYDRALAZINE HYDROCHLORIDE 20 MG/ML
INJECTION INTRAMUSCULAR; INTRAVENOUS AS NEEDED
Status: COMPLETED | OUTPATIENT
Start: 2022-11-17 | End: 2022-11-17

## 2022-11-17 RX ORDER — ATORVASTATIN CALCIUM 40 MG/1
40 TABLET, FILM COATED ORAL EVERY EVENING
Qty: 90 TABLET | Refills: 0 | Status: SHIPPED | OUTPATIENT
Start: 2022-11-17 | End: 2023-11-12

## 2022-11-17 RX ORDER — MIDAZOLAM HYDROCHLORIDE 2 MG/2ML
INJECTION, SOLUTION INTRAMUSCULAR; INTRAVENOUS AS NEEDED
Status: COMPLETED | OUTPATIENT
Start: 2022-11-17 | End: 2022-11-17

## 2022-11-17 RX ORDER — SODIUM CHLORIDE 9 MG/ML
75 INJECTION, SOLUTION INTRAVENOUS CONTINUOUS
Status: DISCONTINUED | OUTPATIENT
Start: 2022-11-17 | End: 2022-11-18 | Stop reason: HOSPADM

## 2022-11-17 RX ORDER — HYDRALAZINE HYDROCHLORIDE 20 MG/ML
10 INJECTION INTRAMUSCULAR; INTRAVENOUS ONCE
Status: COMPLETED | OUTPATIENT
Start: 2022-11-17 | End: 2022-11-17

## 2022-11-17 RX ADMIN — LIDOCAINE HYDROCHLORIDE 10 ML: 10 INJECTION, SOLUTION EPIDURAL; INFILTRATION; INTRACAUDAL; PERINEURAL at 09:45

## 2022-11-17 RX ADMIN — MIDAZOLAM 1 MG: 1 INJECTION INTRAMUSCULAR; INTRAVENOUS at 09:28

## 2022-11-17 RX ADMIN — HYDRALAZINE HYDROCHLORIDE 10 MG: 20 INJECTION, SOLUTION INTRAMUSCULAR; INTRAVENOUS at 09:12

## 2022-11-17 RX ADMIN — HYDRALAZINE HYDROCHLORIDE 10 MG: 20 INJECTION, SOLUTION INTRAMUSCULAR; INTRAVENOUS at 09:34

## 2022-11-17 RX ADMIN — MIDAZOLAM 1 MG: 1 INJECTION INTRAMUSCULAR; INTRAVENOUS at 09:43

## 2022-11-17 RX ADMIN — SODIUM CHLORIDE 75 ML/HR: 0.9 INJECTION, SOLUTION INTRAVENOUS at 08:45

## 2022-11-17 RX ADMIN — FENTANYL CITRATE 50 MCG: 50 INJECTION INTRAMUSCULAR; INTRAVENOUS at 09:43

## 2022-11-17 RX ADMIN — FENTANYL CITRATE 50 MCG: 50 INJECTION INTRAMUSCULAR; INTRAVENOUS at 09:28

## 2022-11-17 NOTE — SEDATION DOCUMENTATION
Bone marrow biopsy completed by Dr Aislinn Becker without complications  Patient tolerated procedure well  Band aide over left lower back puncture site  Report given to [facility]  1 hour bedrest start time 0941

## 2022-11-17 NOTE — INTERVAL H&P NOTE
Update: (This section must be completed if the H&P was completed greater than 24 hrs to procedure or admission)    H&P reviewed  After examining the patient, I find no changed to the H&P since it had been written  Patient re-evaluated   Accept as history and physical     Dayana Mike MD/November 17, 2022/9:52 AM

## 2022-11-17 NOTE — BRIEF OP NOTE (RAD/CATH)
INTERVENTIONAL RADIOLOGY PROCEDURE NOTE    Date: 11/17/2022    Procedure:   Procedure Summary     Date: 11/17/22 Room / Location: [address] [place] [address]    Anesthesia Start:  Anesthesia Stop:     Procedure: IR BIOPSY BONE MARROW Diagnosis:       Biclonal gammopathy      (concern for IgA MM)    Scheduled Providers:  Responsible Provider:     Anesthesia Type: Not recorded ASA Status: Not recorded          Preoperative diagnosis:   1  Biclonal gammopathy         Postoperative diagnosis: Same  Surgeon: Marlyn Zarco MD     Assistant: None  No qualified resident was available  Blood loss: Minimal    Specimens: 6 cc bone marrow, 1 bone core     Findings: BM biopsy     Complications: None immediate      Anesthesia: conscious sedation

## 2022-11-17 NOTE — DISCHARGE INSTRUCTIONS
Moderate Sedation   WHAT YOU NEED TO KNOW:   Moderate sedation, or conscious sedation, is medicine used during procedures to help you feel relaxed and calm  You will be awake and able to follow directions without anxiety or pain  You will remember little to none of the procedure  You may feel tired, weak, or unsteady on your feet after you get sedation  You may also have trouble concentrating or short-term memory loss  These symptoms should go away in 24 hours or less  DISCHARGE INSTRUCTIONS:   Call 911 or have someone else call for any of the following: You have sudden trouble breathing  You cannot be woken  Seek care immediately if:   You have a severe headache or dizziness  Your heart is beating faster than usual   Contact your healthcare provider if:   You have a fever  You have nausea or are vomiting for more than 8 hours after the procedure  Your skin is itchy, swollen, or you have a rash  You have questions or concerns about your condition or care  Self-care:   Have someone stay with you for 24 hours  This person can drive you to errands and help you do things around the house  This person can also watch for problems  Rest and do quiet activities for 24 hours  Do not exercise, ride a bike, or play sports  Stand up slowly to prevent dizziness and falls  Take short walks around the house with another person  Slowly return to your usual activities the next day  Do not drive or use dangerous machines or tools for 24 hours  You may injure yourself or others  Examples include a lawnmower, saw, or drill  Do not return to work for 24 hours if you use dangerous machines or tools for work  Do not make important decisions for 24 hours  For example, do not sign important papers or invest money  Drink liquids as directed  Liquids help flush the sedation medicine out of your body  Ask how much liquid to drink each day and which liquids are best for you        Eat small, frequent meals to prevent nausea and vomiting  Start with clear liquids such as juice or broth  If you do not vomit after clear liquids, you can eat your usual foods  Do not drink alcohol or take medicines that make you drowsy  This includes medicines that help you sleep and anxiety medicines  Ask your healthcare provider if it is safe for you to take pain medicine  Follow up with your healthcare provider as directed: Write down your questions so you remember to ask them during your visits  © 2017 [employer]  Information is for End User's use only and may not be sold, redistributed or otherwise used for commercial purposes  All illustrations and images included in CareNotes® are the copyrighted property of A [employer] M [employer] Inc  or [employer]  The above information is an  only  It is not intended as medical advice for individual conditions or treatments  Talk to your doctor, nurse or pharmacist before following any medical regimen to see if it is safe and effective for you  Bone Marrow Biopsy     WHAT YOU NEED TO KNOW:   A bone marrow biopsy is a procedure to remove a small amount of bone marrow from your bone  Bone marrow is the soft tissue inside your bone that helps to make blood cells  The sample is tested for disease or infection  DISCHARGE INSTRUCTIONS:     1  Limit your activities day of biopsy as directed by your doctor  2  Use medication as ordered  3  Return to your normal diet  Small sips of flat soda will help with nausea  4  Remove band-aid or dressing 24 hours after procedure  Contact Interventional Radiology at [phone] [place] PATIENTS: Contact Interventional Radiology at [phone]) Alannah Martin PATIENTS: Contact Interventional Radiology at [phone]) if:    1  Difficulty breathing, nausea or vomiting  2  Chills or fever above 101 F     3  Pain at biopsy site not relieved by medication      4  Develop any redness, swelling, heat, unusual drainage, heavy bruising or bleeding from biopsy site

## 2022-11-18 LAB — SCAN RESULT: NORMAL

## 2022-11-21 ENCOUNTER — DOCUMENTATION (OUTPATIENT)
Dept: HEMATOLOGY ONCOLOGY | Facility: CLINIC | Age: 72
End: 2022-11-21

## 2022-11-21 LAB
BASOPHILS NFR BLD MANUAL: 2 % (ref 0–1)
IMM EOSINOPHIL NFR BLD MANUAL: 10 % (ref 0–6)
LYMPHOCYTES NFR BLD: 13 % (ref 14–44)
MONOCYTES NFR BLD AUTO: 11 % (ref 4–12)
NEUTS SEG NFR BLD AUTO: 64 % (ref 45–77)
PATHOLOGIST INTERPRETATION: NORMAL
PLATELET BLD QL SMEAR: ADEQUATE
TOTAL CELLS COUNTED SPEC: 100

## 2022-11-21 NOTE — PROGRESS NOTES
[facility] results obtained via right fax  Copy sent to provider to review  Results uploaded into patient chart on 11/21/22

## 2022-11-22 ENCOUNTER — DOCUMENTATION (OUTPATIENT)
Dept: HEMATOLOGY ONCOLOGY | Facility: CLINIC | Age: 72
End: 2022-11-22

## 2022-11-22 NOTE — PROGRESS NOTES
Received [facility] results via right fax  Copy emailed to provider to review on 11/22/22   Results uploaded into patient chart as well on 11/22/22

## 2022-11-25 LAB
MISCELLANEOUS LAB TEST RESULT: NORMAL
MISCELLANEOUS LAB TEST RESULT: NORMAL
SCAN RESULT: NORMAL

## 2022-11-27 PROBLEM — D47.2 SMOLDERING MYELOMA: Status: ACTIVE | Noted: 2022-11-27

## 2022-11-27 NOTE — PROGRESS NOTES
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  I suspect she had this tested due to her elevated alkaline phosphatase as well as elevated serum calcium at 10 3  SPEP revealed IgA kappa 0 25 M peak, IgA lambda 0 14 M peak  No kidney dysfunction or anemia on last lab work  She did mention today that she has bone pain in the right femur shaft area  She has no constitutional symptoms  2022 BMBx 10% Kappa restricted plasma cells in the polyclonal background  November 10 myeloma scan: OSTEOLYTIC LESIONS: None  PROBABLY MALIGNANT VERTEBRAL FRACTURE: None  SUSPICIOUS PERIPHERAL MEDULLARY PATTERN: No    Risk factors: BMPC >10% with M-spike >3 g/dL and FLC ratio >8 or <0 125  Pt doesn't meet any of these criteria and thus is low risk for progression   -Labs to be trended in 6 months along with CT low dose myeloma study      2  Transaminitis  Patient had CMP recently showing AST 59, ALT 97, alkaline phosphatase 435  She has significant concern about the results of this  She does not look jaundiced today in visit  She did have workup including acute hepatitis panel, ceruloplasmin, anti smooth muscle antibody, antimitochondrial antibody which were all unrevealing  Patient states that she has not been drinking alcohol lately  However, she does take extra-strength Tylenol every day for osteoarthritis  3  Hypercalcemia  Mild elevation 10 3  We will further evaluate with the above workup regarding MGUS        F/u 6 months in Iris Tracy MD  Staff Physician, Hematology-Oncology    · Discussion of decision making    I personally reviewed the following lab results, the image studies, pathology, other specialty/physicians consult notes and recommendations, and outside medical records from [facility]/other institutions  I had a lengthy discussion with the patient and shared the work-up findings  We discussed the diagnosis and management plan as below  I spent 34 minutes reviewing the records (labs, clinician notes, outside records, medical history, ordering medicine/tests/procedures, interpreting the imaging/labs previously done) and coordination of care as well as direct time with the patient today, of which greater than 50% of the time was spent in counseling and coordination of care with the patient/family  Reason for visit:       Chief Complaint   Patient presents with   • Consult     Biclonal gammopathy       History of Hematology Illness:     Katia Mckeon is a 73 y o  female who came in for follow up  1  Smoldering myeloma   - Patient initial diagnosis of MGUS was 7/2013 IgA [place] 0 64 (per labs scanned in)  - 03/31/2022:  CBC showing normal, unremarkable findings with hemoglobin 13 7  CMP showed chloride 111, corrected calcium 10 3, AST 59, ALT 97, alk-phos 435, total protein 7 9, albumin 3 3  Kidney functions normal   Hepatitis panel normal   Iron panel normal   SPEP on 04/19 showed M peak 1 concentration 0 25, M peak 2 concentration 0 14  IgA kappa, IgA lambda seen  2  Transaminitis  - 3/31/22: Labs --> AST 59, ALT 97, Alkaline phosphatase 435    - 4/11/22: acute hepatitis panel normal    - Patient has GI appt 5/18  3  Hypercalcemia, seen on above labs     Interval History:   04/29/22: This is a 29-year-old female with GERD, hypertension, obesity, osteoarthritis, fibromyalgia who presents for consultation  Patient has never had cancer before  She has no first-degree family history of cancer  Patient has her next colon cancer screening scheduled for 12/2022  She recently had a mammogram last month which was normal     Patient has no constitutional symptoms such as fever, chills, night sweats, lumps, bumps, sudden weight loss    Does have questionable bone pain in the shaft of the right femur  No bleeding  Patient has no history of definitive autoimmune diseases such as RA, lupus  Does have OA  She does not smoke or drink  She is retired and in the past worked as a ,   No new acute issues  Problem list:       Patient Active Problem List   Diagnosis   • GERD (gastroesophageal reflux disease)   • Colon cancer screening   • Chest pain   • Obesity, morbid ([address] )   • Hypertension   • Chronic left shoulder pain       REVIEW OF SYMPTOMS:   Review of Systems   Constitutional: Positive for fatigue  Negative for activity change, appetite change, chills, diaphoresis, fever and unexpected weight change  HENT: Negative for mouth sores and nosebleeds  Eyes: Negative for visual disturbance  Respiratory: Negative for apnea, cough, choking, chest tightness, shortness of breath, wheezing and stridor  Cardiovascular: Negative for chest pain, palpitations and leg swelling  Gastrointestinal: Negative for abdominal pain, anal bleeding, blood in stool, constipation, diarrhea, nausea and vomiting  Endocrine: Negative for cold intolerance  Genitourinary: Negative for hematuria, menstrual problem and vaginal bleeding  Musculoskeletal: Positive for arthralgias ( chronic joint pain due to OA )  States that she has bone pain in her right femur in the shaft area   Skin: Negative for color change, pallor and rash  Neurological: Negative for dizziness, syncope, light-headedness and headaches  Hematological: Negative for adenopathy  Does not bruise/bleed easily  Psychiatric/Behavioral: Negative for sleep disturbance         PHYSICAL EXAMINATION:     Visit Vitals  OB Status Postmenopausal   Smoking Status Never       Ht Readings from Last 8 Encounters:   04/29/22 5' 3" (1 6 m)   04/04/22 5' 3" (1 6 m)   03/31/22 5' 3" (1 6 m)   03/25/22 5' 3" (1 6 m)   10/23/20 5' 3" (1 6 m)   09/11/20 5' 3" (1 6 m)   08/04/20 5' 3" (1 6 m)   08/04/20 5' 3" (1 6 m)       Wt Readings from Last 8 Encounters:   04/29/22 121 kg (266 lb)   04/04/22 117 kg (258 lb)   03/31/22 117 kg (258 lb)   03/25/22 120 kg (264 lb)   10/23/20 119 kg (262 lb)   09/11/20 119 kg (262 lb)   08/04/20 119 kg (262 lb 5 6 oz)   08/04/20 119 kg (261 lb 14 4 oz)          Physical Exam  Constitutional:       General: She is not in acute distress  Appearance: Normal appearance  She is not ill-appearing, toxic-appearing or diaphoretic  HENT:      Head: Normocephalic and atraumatic  Eyes:      General: No scleral icterus  Extraocular Movements: Extraocular movements intact  Conjunctiva/sclera: Conjunctivae normal       Pupils: Pupils are equal, round, and reactive to light  Cardiovascular:      Rate and Rhythm: Normal rate and regular rhythm  Heart sounds: Normal heart sounds  Pulmonary:      Effort: Pulmonary effort is normal  No respiratory distress  Breath sounds: Normal breath sounds  No stridor  No wheezing, rhonchi or rales  Abdominal:      Palpations: Abdomen is soft  Tenderness: There is no abdominal tenderness  Musculoskeletal:         General: No tenderness  Normal range of motion  Cervical back: Normal range of motion and neck supple  Right lower leg: Edema (Chronic, symmetrical bilateral edema  Not new per patient ) present  Left lower leg: Edema present  Lymphadenopathy:      Cervical: No cervical adenopathy  Skin:     General: Skin is warm and dry  Coloration: Skin is not jaundiced or pale  Findings: No bruising, erythema, lesion or rash  Neurological:      General: No focal deficit present  Mental Status: She is alert and oriented to person, place, and time  Mental status is at baseline  Cranial Nerves: No cranial nerve deficit  Motor: No weakness  Psychiatric:         Mood and Affect: Mood normal          Behavior: Behavior normal          Thought Content:  Thought content normal          Judgment: Judgment normal        Reviewed historical information        PAST MEDICAL HISTORY:    Past Medical History:   Diagnosis Date   • GERD (gastroesophageal reflux disease)    • Obesity        PAST SURGICAL HISTORY:    Past Surgical History:   Procedure Laterality Date   • CARDIAC CATHETERIZATION  2020   •  SECTION     • ESOPHAGOGASTRODUODENOSCOPY     • OVARIAN CYST REMOVAL           CURRENT MEDICATIONS:     Current Outpatient Medications:   •  acetaminophen (TYLENOL) 325 mg tablet, Take 2 tablets (650 mg total) by mouth every 4 (four) hours as needed for mild pain or headaches, Disp: 30 tablet, Rfl: 0  •  Ascorbic Acid (vitamin C) 1000 MG tablet, Take 1,000 mg by mouth daily, Disp: , Rfl:   •  b complex vitamins capsule, Take 1 capsule by mouth daily, Disp: , Rfl:   •  calcium carbonate (OS-SUSIE) 600 MG tablet, Take 600 mg by mouth 2 (two) times a day with meals, Disp: , Rfl:   •  carvedilol (COREG) 12 5 mg tablet, TAKE 1 TABLET TWICE A DAY WITH MEALS, Disp: 180 tablet, Rfl: 3  •  cetirizine (ZyrTEC) 10 mg tablet, Take 1 tablet (10 mg total) by mouth daily, Disp: 90 tablet, Rfl: 0  •  Cholecalciferol (Vitamin D-3) 25 MCG (1000 UT) CAPS, Take 1,000 Units by mouth, Disp: , Rfl:   •  Coenzyme Q10 (CoQ10) 100 MG CAPS, Take by mouth, Disp: , Rfl:   •  esomeprazole (NexIUM) 20 mg capsule, Take 20 mg by mouth 2 (two) times a day, Disp: , Rfl:   •  Krill Oil Omega-3 500 MG CAPS, Take by mouth, Disp: , Rfl:   •  Multiple Vitamin (Multi Vitamin Daily) TABS, Take by mouth, Disp: , Rfl:   •  Plant Sterols and Stanols (CHOLESTOFF PO), Take 1 tablet by mouth daily, Disp: , Rfl:   •  triamcinolone (KENALOG) 0 5 % ointment, Apply topically 2 (two) times a day, Disp: 30 g, Rfl: 0  •  Turmeric 500 MG CAPS, Take 500 mg by mouth, Disp: , Rfl:   •  Xiidra 5 % op solution, Administer 1 drop to both eyes daily, Disp: , Rfl:   •  aspirin 81 mg chewable tablet, Chew 1 tablet (81 mg total) daily, Disp: 30 tablet, Rfl: 0  • atorvastatin (LIPITOR) 40 mg tablet, Take 1 tablet (40 mg total) by mouth every evening, Disp: 90 tablet, Rfl: 3  •  bepotastine besilate (BEPREVE) 1 5 % op soln, Administer 1 drop to both eyes daily (Patient not taking: Reported on 4/4/2022 ), Disp: , Rfl:   •  valACYclovir (VALTREX) 500 mg tablet, Take 1 tablet (500 mg total) by mouth 2 (two) times a day for 3 days, Disp: 12 tablet, Rfl: 3    Family History   Social History     Tobacco Use   • Smoking status: Never Smoker   • Smokeless tobacco: Never Used   Vaping Use   • Vaping Use: Never used   Substance Use Topics   • Alcohol use:  Yes   • Drug use: Never    Relation Age of Onset     Mother      Mother      Father      Maternal Grandmother      Maternal Grandfather      Paternal  Family History   Problem Relation Age of Onset   • Hypertension Mother    • Arthritis Mother    • Heart disease Father    • No Known Problems Maternal Grandmother    • No Known Problems Maternal Grandfather    • No Known Problems Paternal Grandmother    • No Known Problems Paternal Grandfather    • No Known Problems Maternal Aunt    • No Known Problems Maternal Aunt     Grandmother    • No Known Problems Paternal Grandfather    • No Known Problems Maternal Aunt    • No Known Problems Maternal Aunt       Allergen Reactions   • Penicillins      dizziness   • Latex Rash       Lab Re  Lab Results   Component Value Date    WBC 7 37 03/31/2022    HGB 13 7 03/31/2022    HCT 43 0 03/31/2022    MCV 93 03/31/2022     03/31/2022   sults   Component Value Date    WBC 7 37 03/31/2022    HGB 13 7 03/31/2022    HCT 43 0 03/31/2022    MCV 93 03/31/2022     03/31/2022      Component Value Date    SODIUM 141 04/29/2022    K 3 9 04/29/2022     04/29/2022    CO2 27 04/29/2022    AGAP 9 04/29/2022    BUN 15 04/29/2022    CREATININE 0 99 04/29/2022    GLUC 87 04/29/2022    GLUF 96 03/31/2022    CALCIUM 9 5 04/29/2022    AST 23 04/29/2022    ALT 29 04/29/2022    ALKPHOS 140 (H) 04/29/2022 TP 8 2 04/29/2022    TBILI 0 38 04/29/2022    EGFR 58 04/29/2022       IMAGING:  Mammo screening bilateral w 3d & cad  Narrative: DIAGNOSIS: Encounter for screening mammogram for breast cancer     TECHNIQUE:  Digital screening mammography was performed  Computer Aided Detection   (CAD) analyzed all applicable images  COMPARISONS: Prior breast imaging dated: 11/08/2019    RELEVANT HISTORY:   Family Breast Cancer History: No known family history of breast cancer  Family Medical History: No known relevant family medical history  Personal History: No known relevant hormone history  No known relevant   surgical history  No known relevant medical history  The patient is scheduled in a reminder system for screening mammography  8-10% of cancers will be missed on mammography  Management of a palpable   abnormality must be based on clinical grounds  Patients will be notified   of their results via letter from our facility  Accredited by [employer] [facility] and FDA  RISK ASSESSMENT:   5 Year Tyrer-Cuzick: 0 94 %  10 Year Tyrer-Cuzick: 1 97 %  Lifetime Tyrer-Cuzick: 3 37 %    TISSUE DENSITY:   The breasts are almost entirely fatty  INDICATION: Eura Mortimer is a 73 y o  female presenting for screening   mammography  FINDINGS:   There are no suspicious masses, grouped microcalcifications or areas of   architectural distortion  The skin and nipple areolar complex are   unremarkable  Impression: No mammographic evidence of malignancy  ASSESSMENT/BI-RADS CATEGORY:  Left: 1 - Negative  Right: 1 - Negative  Overall: 1 - Negative    RECOMMENDATION:       - Routine screening mammogram in 1 year for both breasts  Workstation ID: [id number]  Mammo outside images  1 [phone] [id number]  0 [phone] [id number] [phone] [phone] [id number]

## 2022-12-07 ENCOUNTER — OFFICE VISIT (OUTPATIENT)
Dept: HEMATOLOGY ONCOLOGY | Facility: CLINIC | Age: 72
End: 2022-12-07

## 2022-12-07 VITALS
BODY MASS INDEX: 45.71 KG/M2 | RESPIRATION RATE: 16 BRPM | DIASTOLIC BLOOD PRESSURE: 84 MMHG | SYSTOLIC BLOOD PRESSURE: 144 MMHG | HEIGHT: 63 IN | WEIGHT: 258 LBS

## 2022-12-07 DIAGNOSIS — D47.2 SMOLDERING MYELOMA: Primary | ICD-10-CM

## 2022-12-21 ENCOUNTER — TELEPHONE (OUTPATIENT)
Dept: FAMILY MEDICINE CLINIC | Facility: CLINIC | Age: 72
End: 2022-12-21

## 2022-12-21 NOTE — TELEPHONE ENCOUNTER
Spoke with pt, Pt will be due for an  AWV after 03/25/23  Please schedule when pt calls back   She will check her calendar first      Albertina Brooks/lorna  12/21/22  1:23 PM

## 2022-12-28 ENCOUNTER — NURSE TRIAGE (OUTPATIENT)
Dept: OTHER | Facility: OTHER | Age: 72
End: 2022-12-28

## 2022-12-28 NOTE — TELEPHONE ENCOUNTER
Regarding: colonoscopy questions  ----- Message from Leslie Aggarwal sent at 12/28/2022 11:46 AM EST -----  " I received two different preps for my upcoming procedure and I just want to know which one I am supposed to follow "

## 2022-12-28 NOTE — TELEPHONE ENCOUNTER
Patient called in to report she has two bowel prep protocols  Patient has picked up Golytely prep from pharmacy and has not purchased any products from Miralax/Dulcolax protocol  Patient will follow Golytely protocol  Patient has a question regarding her BP medication, carvedilol 12 5 mg twice a day  Can she take AM dose prior to procedure? Please follow up with patient for advice  Reason for Disposition  • Bowel prep for colonoscopy, questions about    Answer Assessment - Initial Assessment Questions  1  DATE/TIME: "When did you have your colonoscopy?"       Scheduled for 1/3/223  2   MAIN CONCERN: "What is your main concern right now?" "What questions do you have?"     Patient has two bowel preps Golytely and Miralax/Dulcolax    Protocols used: COLONOSCOPY SYMPTOMS AND QUESTIONS-ADULT-

## 2023-01-03 ENCOUNTER — ANESTHESIA (OUTPATIENT)
Dept: GASTROENTEROLOGY | Facility: HOSPITAL | Age: 73
End: 2023-01-03

## 2023-01-03 ENCOUNTER — HOSPITAL ENCOUNTER (OUTPATIENT)
Dept: GASTROENTEROLOGY | Facility: HOSPITAL | Age: 73
Setting detail: OUTPATIENT SURGERY
Discharge: HOME | End: 2023-01-03
Attending: INTERNAL MEDICINE

## 2023-01-03 ENCOUNTER — ANESTHESIA EVENT (OUTPATIENT)
Dept: GASTROENTEROLOGY | Facility: HOSPITAL | Age: 73
End: 2023-01-03

## 2023-01-03 VITALS
RESPIRATION RATE: 18 BRPM | BODY MASS INDEX: 45.71 KG/M2 | HEART RATE: 61 BPM | TEMPERATURE: 96.7 F | SYSTOLIC BLOOD PRESSURE: 177 MMHG | OXYGEN SATURATION: 99 % | WEIGHT: 258 LBS | DIASTOLIC BLOOD PRESSURE: 76 MMHG | HEIGHT: 63 IN

## 2023-01-03 DIAGNOSIS — R13.10 DYSPHAGIA, UNSPECIFIED TYPE: ICD-10-CM

## 2023-01-03 DIAGNOSIS — Z12.11 COLON CANCER SCREENING: ICD-10-CM

## 2023-01-03 DIAGNOSIS — K21.9 GASTROESOPHAGEAL REFLUX DISEASE WITHOUT ESOPHAGITIS: ICD-10-CM

## 2023-01-03 DIAGNOSIS — K44.9 HIATAL HERNIA: ICD-10-CM

## 2023-01-03 RX ORDER — LIDOCAINE HYDROCHLORIDE 10 MG/ML
INJECTION, SOLUTION EPIDURAL; INFILTRATION; INTRACAUDAL; PERINEURAL AS NEEDED
Status: DISCONTINUED | OUTPATIENT
Start: 2023-01-03 | End: 2023-01-03

## 2023-01-03 RX ORDER — SODIUM CHLORIDE, SODIUM LACTATE, POTASSIUM CHLORIDE, CALCIUM CHLORIDE 600; 310; 30; 20 MG/100ML; MG/100ML; MG/100ML; MG/100ML
INJECTION, SOLUTION INTRAVENOUS CONTINUOUS PRN
Status: DISCONTINUED | OUTPATIENT
Start: 2023-01-03 | End: 2023-01-03

## 2023-01-03 RX ORDER — SODIUM CHLORIDE, SODIUM LACTATE, POTASSIUM CHLORIDE, CALCIUM CHLORIDE 600; 310; 30; 20 MG/100ML; MG/100ML; MG/100ML; MG/100ML
100 INJECTION, SOLUTION INTRAVENOUS CONTINUOUS
Status: CANCELLED | OUTPATIENT
Start: 2023-01-03

## 2023-01-03 RX ORDER — PROPOFOL 10 MG/ML
INJECTION, EMULSION INTRAVENOUS AS NEEDED
Status: DISCONTINUED | OUTPATIENT
Start: 2023-01-03 | End: 2023-01-03

## 2023-01-03 RX ORDER — PROPOFOL 10 MG/ML
INJECTION, EMULSION INTRAVENOUS CONTINUOUS PRN
Status: DISCONTINUED | OUTPATIENT
Start: 2023-01-03 | End: 2023-01-03

## 2023-01-03 RX ADMIN — PROPOFOL 50 MG: 10 INJECTION, EMULSION INTRAVENOUS at 08:51

## 2023-01-03 RX ADMIN — LIDOCAINE HYDROCHLORIDE 50 MG: 10 INJECTION, SOLUTION EPIDURAL; INFILTRATION; INTRACAUDAL; PERINEURAL at 08:48

## 2023-01-03 RX ADMIN — PROPOFOL 50 MG: 10 INJECTION, EMULSION INTRAVENOUS at 08:55

## 2023-01-03 RX ADMIN — PROPOFOL 100 MCG/KG/MIN: 10 INJECTION, EMULSION INTRAVENOUS at 09:02

## 2023-01-03 RX ADMIN — SODIUM CHLORIDE, SODIUM LACTATE, POTASSIUM CHLORIDE, AND CALCIUM CHLORIDE: .6; .31; .03; .02 INJECTION, SOLUTION INTRAVENOUS at 07:37

## 2023-01-03 RX ADMIN — PROPOFOL 50 MG: 10 INJECTION, EMULSION INTRAVENOUS at 08:59

## 2023-01-03 RX ADMIN — PROPOFOL 50 MG: 10 INJECTION, EMULSION INTRAVENOUS at 08:57

## 2023-01-03 RX ADMIN — PROPOFOL 50 MG: 10 INJECTION, EMULSION INTRAVENOUS at 08:53

## 2023-01-03 RX ADMIN — PROPOFOL 100 MG: 10 INJECTION, EMULSION INTRAVENOUS at 08:48

## 2023-01-03 RX ADMIN — SIMETHICONE 20 MG: 20 EMULSION ORAL at 09:07

## 2023-01-03 NOTE — ANESTHESIA POSTPROCEDURE EVALUATION
Post-Op Assessment Note    CV Status:  Stable  Pain Score: 0    Pain management: adequate     Mental Status:  Alert and awake   Hydration Status:  Stable   PONV Controlled:  None   Airway Patency:  Patent      Post Op Vitals Reviewed: Yes      Staff: CRNA         No notable events documented      BP     Temp     Pulse     Resp      SpO2

## 2023-01-03 NOTE — NURSING NOTE
Pt seen and instructed by Dr Shayy Hernandez, written and verbal instructions given, pt and  verbalize an understanding, no increase in redness or itching, no further hives, pt states her skin is sensitive and that this happens to her all the time

## 2023-01-03 NOTE — NURSING NOTE
Pt returned to [facility] awake,alert, itcing, red blotches noted on arms chest and neck, one hive noted on neck, Dr Chad Gomes and anesthesia aware

## 2023-01-03 NOTE — ANESTHESIA PREPROCEDURE EVALUATION
Procedure:  EGD  COLONOSCOPY    Relevant Problems   CARDIO   (+) Chest pain   (+) Hypertension      GI/HEPATIC   (+) GERD (gastroesophageal reflux disease)      HEMATOLOGY   (+) Smoldering myeloma      NEURO/PSYCH   (+) Chronic left shoulder pain      Other   (+) Obesity, morbid (HCC)        Physical Exam    Airway    Mallampati score: II         Dental   implants,     Cardiovascular  Cardiovascular exam normal    Pulmonary  Pulmonary exam normal     Other Findings        Anesthesia Plan  ASA Score- 3     Anesthesia Type- IV sedation with anesthesia with ASA Monitors  Additional Monitors:   Airway Plan:           Plan Factors-Exercise tolerance (METS): >4 METS  Chart reviewed  EKG reviewed  Existing labs reviewed  Patient summary reviewed  Patient is not a current smoker  Patient not instructed to abstain from smoking on day of procedure  Patient did not smoke on day of surgery  Induction-     Postoperative Plan-     Informed Consent- Anesthetic plan and risks discussed with patient and spouse  I personally reviewed this patient with the CRNA  Discussed and agreed on the Anesthesia Plan with the CRNA             Lab Results   Component Value Date    HGBA1C 5 3 07/27/2020       Lab Results   Component Value Date    K 3 8 10/17/2022     (H) 10/17/2022    CO2 27 10/17/2022    BUN 16 10/17/2022    CREATININE 1 00 10/17/2022    GLUF 89 10/17/2022    CALCIUM 9 9 10/17/2022    CORRECTEDCA 10 6 (H) 10/17/2022    AST 16 10/17/2022    ALT 26 10/17/2022    ALKPHOS 75 10/17/2022    EGFR 57 10/17/2022       Lab Results   Component Value Date    WBC 7 08 11/17/2022    HGB 13 4 11/17/2022    HCT 41 1 11/17/2022    MCV 93 11/17/2022     11/17/2022   echo June 2022  •  Left Ventricle: Left ventricular cavity size is normal  Wall thickness is normal  Systolic function is normal (60%)   Wall motion is normal  Diastolic function is normal   •  Right Ventricle: Right ventricular cavity size is normal  Systolic function is normal   •  Aortic Valve: There is mild regurgitation  There is mild stenosis  •  Mitral Valve: There is mild annular calcification  There is mild regurgitation  •  Tricuspid Valve: There is trace regurgitation  •  Pulmonic Valve:  There is trace regurgitation

## 2023-01-03 NOTE — H&P
History and Physical - [facility]  Katia Mckeon 81 y o  female MRN: [id number]                  HPI: Katia Mckeon is a 81y o  year old female who presents for Felix's screening, colorectal cancer screening  REVIEW OF SYSTEMS: Per the HPI, and otherwise unremarkable      Historical Information   Past Medical History:   Diagnosis Date   • GERD (gastroesophageal reflux disease)    • Obesity      Past Surgical History:   Procedure Laterality Date   • CARDIAC CATHETERIZATION  2020   •  SECTION     • ESOPHAGOGASTRODUODENOSCOPY     • IR BIOPSY BONE MARROW  2022   • OVARIAN CYST REMOVAL       Social History   Social History     Substance and Sexual Activity   Alcohol Use Yes    Comment: social     Social History     Substance and Sexual Activity   Drug Use Never     Social History     Tobacco Use   Smoking Status Never   Smokeless Tobacco Never     Family History   Problem Relation Age of Onset   • Hypertension Mother    • Arthritis Mother    • Heart disease Father    • No Known Problems Maternal Grandmother    • No Known Problems Maternal Grandfather    • No Known Problems Paternal Grandmother    • No Known Problems Paternal Grandfather    • No Known Problems Maternal Aunt    • No Known Problems Maternal Aunt        Meds/Allergies       Current Outpatient Medications:   •  Ascorbic Acid (vitamin C) 1000 MG tablet  •  atorvastatin (LIPITOR) 40 mg tablet  •  b complex vitamins capsule  •  calcium carbonate (OS-SUSIE) 600 MG tablet  •  carvedilol (COREG) 12 5 mg tablet  •  cetirizine (ZyrTEC) 10 mg tablet  •  Cholecalciferol (Vitamin D-3) 25 MCG (1000 UT) CAPS  •  Coenzyme Q10 (CoQ10) 100 MG CAPS  •  dicyclomine (BENTYL) 20 mg tablet  •  esomeprazole (NexIUM) 20 mg capsule  •  Krill Oil Omega-3 500 MG CAPS  •  linaCLOtide 145 MCG CAPS  •  Multiple Vitamin (Multi Vitamin Daily) TABS  •  Plant Sterols and Stanols (CHOLESTOFF PO)  •  Turmeric 500 MG CAPS  •  bepotastine besilate (BEPREVE) 1 5 % op soln  •  Cequa 0 09 % SOLN  •  Diclofenac Sodium (VOLTAREN) 1 %  •  polyethylene glycol (Golytely) 4000 mL solution  •  triamcinolone (KENALOG) 0 5 % ointment  •  valACYclovir (VALTREX) 500 mg tablet    Allergies   Allergen Reactions   • Penicillins      dizziness   • Seasonal Ic [Cholestatin] Sneezing and Eye Swelling     Does take zertec   • Latex Rash       Objective     BP (!) 186/100 (BP Location: Left arm)   Pulse 82   Temp (!) 96 7 °F (35 9 °C) (Temporal)   Resp 18   SpO2 94%       PHYSICAL EXAM    Gen: NAD  Head: NCAT  CV: RRR  CHEST: Clear  ABD: soft, NT/ND  EXT: no edema      ASSESSMENT/PLAN:  This is a 81y o  year old female here for EGD/colonoscopy, and she is stable and optimized for her procedure

## 2023-01-04 NOTE — RESULT ENCOUNTER NOTE
My Chart message sent  Reactive gastropathy, chronic gastritis  Lipoma confirmed on biopsy  No further colonoscopies

## 2023-01-18 DIAGNOSIS — R10.30 LOWER ABDOMINAL PAIN: ICD-10-CM

## 2023-01-18 RX ORDER — DICYCLOMINE HCL 20 MG
TABLET ORAL
Qty: 120 TABLET | Refills: 1 | Status: SHIPPED | OUTPATIENT
Start: 2023-01-18

## 2023-01-20 ENCOUNTER — TELEPHONE (OUTPATIENT)
Dept: FAMILY MEDICINE CLINIC | Facility: CLINIC | Age: 73
End: 2023-01-20

## 2023-01-20 NOTE — TELEPHONE ENCOUNTER
Pt scheduled pre-op clearance on 3/1 for cataract surgery on 3/14 - her  called the eye dr and was told she needed labs done prior to the clearance appt - they requested a CBC, EKG w/ reading and SMA-6  When pre-op visit is complete please fax all forms & labs to  Dr Arnett Boeck fax# [phone]  Please advise

## 2023-01-20 NOTE — TELEPHONE ENCOUNTER
T/c from pt -- called to schedule cataract sx clearance -- sx on 3/14/23  Per pt, second eye is not scheduled yet  Informed pt the second sx is typically done two weeks after the first, which would be 3/28, and we try to schedule the clearance appts so that the second sx is covered by the one clearance  Pt informed me that she wanted her clearance 30 days from her 3/14 date when I tried to explain this  Refused to accept my advisement and took clearance for 3/21, which would mean her clearance will not cover any scheduled second procedure and she will have to return for a second clearance  Attempted to explain this to pt  She again, did not accept my advisement

## 2023-01-20 NOTE — TELEPHONE ENCOUNTER
Pre op labs should be ordered by the requesting provider      Braden Shine DO  [facility]  1/20/2023 3:24 PM

## 2023-01-23 NOTE — TELEPHONE ENCOUNTER
Spoke with pt - pt notified that Pre op labs should be ordered by the requesting provider  Pt agrees  Pt will have labs / PAT done  7-10 days  prior pre-op appt  Pt agrees

## 2023-01-24 ENCOUNTER — OFFICE VISIT (OUTPATIENT)
Dept: GASTROENTEROLOGY | Facility: MEDICAL CENTER | Age: 73
End: 2023-01-24

## 2023-01-24 VITALS
OXYGEN SATURATION: 98 % | WEIGHT: 255 LBS | DIASTOLIC BLOOD PRESSURE: 78 MMHG | HEIGHT: 63 IN | SYSTOLIC BLOOD PRESSURE: 134 MMHG | BODY MASS INDEX: 45.18 KG/M2 | HEART RATE: 62 BPM

## 2023-01-24 DIAGNOSIS — K59.00 CONSTIPATION, UNSPECIFIED CONSTIPATION TYPE: Primary | ICD-10-CM

## 2023-01-24 DIAGNOSIS — K21.9 GASTROESOPHAGEAL REFLUX DISEASE WITHOUT ESOPHAGITIS: ICD-10-CM

## 2023-01-24 NOTE — PATIENT INSTRUCTIONS
High Fiber Diet   AMBULATORY CARE:   A high-fiber diet  includes foods that have a high amount of fiber  Fiber is the part of fruits, vegetables, and grains that is not broken down by your body  Fiber keeps your bowel movements regular  Fiber can also help lower your cholesterol level, control blood sugar in people with diabetes, and relieve constipation  Fiber can also help you control your weight because it helps you feel full faster  Most adults should eat 25 to 35 grams of fiber each day  Talk to your dietitian or healthcare provider about the amount of fiber you need  Good sources of fiber:       Foods with at least 4 grams of fiber per serving:      ? to ½ cup of high-fiber cereal (check the nutrition label on the box)    ½ cup of blackberries or raspberries    4 dried prunes    1 cooked artichoke    ½ cup of cooked legumes, such as lentils, or red, kidney, and callejas beans    Foods with 1 to 3 grams of fiber per servin slice of whole-wheat, pumpernickel, or rye bread    ½ cup of cooked brown rice    4 whole-wheat crackers    1 cup of oatmeal    ½ cup of cereal with 1 to 3 grams of fiber per serving (check the nutrition label on the box)    1 small piece of fruit, such as an apple, banana, pear, kiwi, or orange    3 dates    ½ cup of canned apricots, fruit cocktail, peaches, or pears    ½ cup of raw or cooked vegetables, such as carrots, cauliflower, cabbage, spinach, squash, or corn    Ways that you can increase fiber in your diet:   Choose brown or wild rice instead of white rice  Use whole wheat flour in recipes instead of white or all-purpose flour  Add beans and peas to casseroles or soups  Choose fresh fruit and vegetables with peels or skins on instead of juices  Other diet guidelines to follow: Add fiber to your diet slowly  You may have abdominal discomfort, bloating, and gas if you add fiber to your diet too quickly       Drink plenty of liquids as you add fiber to your diet   You may have nausea or develop constipation if you do not drink enough water  Ask how much liquid to drink each day and which liquids are best for you  © Copyright [employer] 2022 Information is for End User's use only and may not be sold, redistributed or otherwise used for commercial purposes  All illustrations and images included in CareNotes® are the copyrighted property of [employer]  or [facility] Racheal Kramer   The above information is an  only  It is not intended as medical advice for individual conditions or treatments  Talk to your doctor, nurse or pharmacist before following any medical regimen to see if it is safe and effective for you

## 2023-01-24 NOTE — PROGRESS NOTES
[facility] - Outpatient Follow-up Note  Alea Merlos 81 y o  female MRN: [id number]  Encounter: [id number]          ASSESSMENT AND PLAN:  53-year-old female with history of GERD, hypertension, constipation who presents for follow-up evaluation  1  Constipation, unspecified constipation type  She reports history of chronic constipation  She was started on Linzess 290 mcg daily and had explosive diarrhea  She resumed 145 mcg daily of Linzess after her colonoscopy however had no improvement of her constipation  She has been using high-fiber cereals and fruits and with addition of fiber has been having regular discussed constipation management with her today including high-fiber diet, fiber supplementation with goal 25 g/day, adequate hydration  2  Gastroesophageal reflux disease without esophagitis  She reports history of chronic GERD currently controlled on Nexium 20 mg daily  Recent EGD showed 4 cm hiatal hernia otherwise unremarkable exam   She will continue daily PPI and dietary/antireflux measures    Follow-up in 6 months    ______________________________________________________________________    SUBJECTIVE: 53-year-old female with history of GERD, hypertension, constipation who presents for follow-up evaluation  She was last seen in the GI office November 2022  She has history of chronic constipation at her last office visit she was started on Linzess 290 mcg daily  She reported no effect with the medication initially followed by watery diarrhea  On her CT scan she had a significant stool burden  She was recommended to do a "mini bowel prep" with MiraLAX and Gatorade at home and then resume Linzess 145 mcg daily thereafter and use Bentyl as needed for abdominal pain  Interval history: January 2023 EGD showed 4 cm hiatal hernia otherwise normal exam   Gastric biopsy showed mild chronic inactive gastritis negative for H  pylori    Colonoscopy had 1 ascending colon lipoma, mild diverticulosis and large internal hemorrhoids otherwise normal   She was recommended not to repeat colonoscopy in the future given her age  She was taking the Linzess 145 mcg daily after the colonoscopy but had no bowel movement for a week  She then started increasing fiber supplementation with dried fruits, fiber cereals  She is having bowel movements every day for the past 4 days  At times she has a sense of incomplete evacuation  She denies rectal bleeding  She continues take Nexium with good control of her heartburn      Prior EGD/colonoscopy    EGD showed gastritis and hiatal hernia gastric biopsies were negative for H pylori   esophagram shows small hiatal hernia no evidence of GERD   abdominal ultrasound and HIDA scan are within normal limits    REVIEW OF SYSTEMS IS OTHERWISE NEGATIVE    10 point review of systems is negative other than stated as per HPI    Historical Information   Past Medical History:   Diagnosis Date   • GERD (gastroesophageal reflux disease)    • Obesity      Past Surgical History:   Procedure Laterality Date   • CARDIAC CATHETERIZATION  2020   •  SECTION     • ESOPHAGOGASTRODUODENOSCOPY     • IR BIOPSY BONE MARROW  2022   • OVARIAN CYST REMOVAL       Social History   Social History     Substance and Sexual Activity   Alcohol Use Yes    Comment: social     Social History     Substance and Sexual Activity   Drug Use Never     Social History     Tobacco Use   Smoking Status Never   Smokeless Tobacco Never     Family History   Problem Relation Age of Onset   • Hypertension Mother    • Arthritis Mother    • Heart disease Father    • No Known Problems Maternal Grandmother    • No Known Problems Maternal Grandfather    • No Known Problems Paternal Grandmother    • No Known Problems Paternal Grandfather    • No Known Problems Maternal Aunt    • No Known Problems Maternal Aunt        Meds/Allergies       Current Outpatient Medications:   • Ascorbic Acid (vitamin C) 1000 MG tablet  •  atorvastatin (LIPITOR) 40 mg tablet  •  b complex vitamins capsule  •  bepotastine besilate (BEPREVE) 1 5 % op soln  •  calcium carbonate (OS-SUSIE) 600 MG tablet  •  carvedilol (COREG) 12 5 mg tablet  •  Cequa 0 09 % SOLN  •  cetirizine (ZyrTEC) 10 mg tablet  •  Cholecalciferol (Vitamin D-3) 25 MCG (1000 UT) CAPS  •  Coenzyme Q10 (CoQ10) 100 MG CAPS  •  Diclofenac Sodium (VOLTAREN) 1 %  •  dicyclomine (BENTYL) 20 mg tablet  •  esomeprazole (NexIUM) 20 mg capsule  •  Krill Oil Omega-3 500 MG CAPS  •  linaCLOtide 145 MCG CAPS  •  Multiple Vitamin (Multi Vitamin Daily) TABS  •  Plant Sterols and Stanols (CHOLESTOFF PO)  •  triamcinolone (KENALOG) 0 5 % ointment  •  Turmeric 500 MG CAPS  •  valACYclovir (VALTREX) 500 mg tablet    Allergies   Allergen Reactions   • Penicillins      dizziness   • Seasonal Ic [Cholestatin] Sneezing and Eye Swelling     Does take zertec   • Latex Rash           Objective     Blood pressure 134/78, pulse 62, height 5' 3" (1 6 m), weight 116 kg (255 lb), SpO2 98 %, not currently breastfeeding  There is no height or weight on file to calculate BMI  PHYSICAL EXAM:      General Appearance:   Alert, cooperative, no distress   HEENT:   Normocephalic, atraumatic, anicteric  Neck:  Supple, symmetrical, trachea midline   Lungs:   Clear to auscultation bilaterally; no rales, rhonchi or wheezing; respirations unlabored    Heart[de-identified]   Regular rate and rhythm; no murmur, rub, or gallop  Abdomen:   Soft, non-tender, non-distended; normal bowel sounds; no masses, no organomegaly    Genitalia:   Deferred    Rectal:   Deferred    Extremities:  No cyanosis, clubbing or edema    Pulses:  2+ and symmetric    Skin:  No jaundice, rashes, or lesions    Lymph nodes:  No palpable cervical lymphadenopathy        Lab Results:   No visits with results within 1 Day(s) from this visit     Latest known visit with results is:   Hospital Outpatient Visit on 01/03/2023 Component Date Value   • Case Report 01/03/2023                      Value:Surgical Pathology Report                         Case: [id number]                                   Authorizing Provider:  Javier Molina DO      Collected:           01/03/2023 4865              Ordering Location:     [facility] Received:            01/03/2023 1136                                     Heart Endoscopy                                                              Pathologist:           Mike Troy MD                                                         Specimen:    Stomach, Bx r/o H  Pylori                                                                  • Final Diagnosis 01/03/2023                      Value: This result contains rich text formatting which cannot be displayed here  • Additional Information 01/03/2023                      Value: This result contains rich text formatting which cannot be displayed here  • Gross Description 01/03/2023                      Value: This result contains rich text formatting which cannot be displayed here  • Clinical Information 01/03/2023                      Value:· The esophagus appeared normal   · Regular Z-line 38 cm from the incisors  · 4 cm hiatal hernia - GE junction 34 cm from the incisors, diaphragmatic impression 38 cm from the incisors  · The stomach appeared normal   · Performed forceps biopsies in the stomach  2 biopsies taken from the antrum, 1 from the incisura, 2 from the body to rule out H pylori    · The duodenum appeared normal    • Case Report 01/03/2023                      Value:Surgical Pathology Report                         Case: [id number]                                   Authorizing Provider:  Gibran Piedra MD       Collected:           01/03/2023 0912              Ordering Location:     [facility] Received:            01/03/2023 1136                                     Heart Endoscopy Pathologist:           Nabil Smith MD                                                         Specimen:    Large Intestine, Right/Ascending Colon, Bx lipoma                                         • Final Diagnosis 01/03/2023                      Value: This result contains rich text formatting which cannot be displayed here  • Additional Information 01/03/2023                      Value: This result contains rich text formatting which cannot be displayed here  • Gross Description 01/03/2023                      Value: This result contains rich text formatting which cannot be displayed here  • Clinical Information 01/03/2023                      Value:· Lipoma with pillow sign measuring 1 to 4 cm in the proximal ascending colon; performed partial removal by cold forceps biopsy  · The ileocecal valve, cecum, hepatic flexure, transverse colon, splenic flexure, descending colon, sigmoid colon and rectosigmoid appeared normal   · Few small, mild scattered diverticula in the sigmoid colon  · Large, internal hemorrhoids; no bleeding was identified         Radiology Results:   EGD    Result Date: 1/3/2023  Narrative: Table formatting from the original result was not included  [facility] [address] [id number] [phone] DATE OF SERVICE: 1/03/23 PHYSICIAN(S): Attending: Lisset Steel MD Fellow: Kesha Beltran DO INDICATION: Hiatal hernia, Gastroesophageal reflux disease without esophagitis, Dysphagia, unspecified type POST-OP DIAGNOSIS: See the impression below  PREPROCEDURE: Informed consent was obtained for the procedure, including sedation  Risks of perforation, hemorrhage, adverse drug reaction and aspiration were discussed  The patient was placed in the left lateral decubitus position  Patient was explained about the risks and benefits of the procedure   Risks including but not limited to bleeding, infection, and perforation were explained in detail  Also explained about less than 100% sensitivity with the exam and other alternatives  PROCEDURE: EGD DETAILS OF PROCEDURE: Patient was taken to the procedure room where a time out was performed to confirm correct patient and correct procedure  The patient underwent monitored anesthesia care, which was administered by an anesthesia professional  The patient's blood pressure, heart rate, level of consciousness, respirations, oxygen and ETCO2 were monitored throughout the procedure  The scope was advanced to the second part of the duodenum  Retroflexion was performed in the fundus  Prior to the procedure, the patient's H  Pylori status was negative  The patient experienced no blood loss  The procedure was not difficult  The patient tolerated the procedure well  There were no apparent complications  ANESTHESIA INFORMATION: ASA: III Anesthesia Type: IV Sedation with Anesthesia MEDICATIONS: simethicone (MYLICON) 20 mg in sterile water 180 mL 20 mg (Totals for administrations occurring from 0844 to 0919 on 01/03/23) FINDINGS: The esophagus appeared normal  Regular Z-line 38 cm from the incisors 4 cm hiatal hernia - GE junction 34 cm from the incisors, diaphragmatic impression 38 cm from the incisors The stomach appeared normal  Performed forceps biopsies in the stomach  2 biopsies taken from the antrum, 1 from the incisura, 2 from the body to rule out H pylori  The duodenum appeared normal  SPECIMENS: ID Type Source Tests Collected by Time Destination 1 : Bx r/o H  Pylori Tissue Stomach TISSUE EXAM Kyara Palacio DO 1/3/2023 1041  2 : Bx lipoma Tissue Large Intestine, Right/Ascending Colon TISSUE EXAM Alana Atkins MD 1/3/2023 2949      Impression: Normal esophagus 4 cm hiatal hernia  Regular Z-line Normal stomach, biopsies taken to rule out H  pylori  Normal duodenum  RECOMMENDATION:  Await pathology results Continue Nexium Proceed with colonoscopy     Seema Mejia Esa Brush MD     Colonoscopy    Result Date: 1/3/2023  Narrative: Table formatting from the original result was not included  [facility] Endoscopy [address] [id number] [phone] DATE OF SERVICE: 1/03/23 PHYSICIAN(S): Attending: Alana Atkins MD Fellow: Kyara Palacio DO INDICATION: Colon cancer screening POST-OP DIAGNOSIS: See the impression below  HISTORY: Prior colonoscopy: More than 10 years ago  BOWEL PREPARATION: Golytely/Colyte/Trilyte PREPROCEDURE: Informed consent was obtained for the procedure, including sedation  Risks including but not limited to bleeding, infection, perforation, adverse drug reaction and aspiration were explained in detail  Also explained about less than 100% sensitivity with the exam and other alternatives  The patient was placed in the left lateral decubitus position  Procedure: Colonoscopy DETAILS OF PROCEDURE: Patient was taken to the procedure room where a time out was performed to confirm correct patient and correct procedure  The patient underwent monitored anesthesia care, which was administered by an anesthesia professional  The patient's blood pressure, heart rate, level of consciousness, oxygen, respirations and ETCO2 were monitored throughout the procedure  A digital rectal exam was performed  A perianal exam was performed  The scope was introduced through the anus and advanced to the cecum  Retroflexion was performed in the rectum  The quality of bowel preparation was evaluated using the [facility] Bowel Preparation Scale with scores of: right colon = 2, transverse colon = 2, left colon = 3  The total BBPS score was 7  Bowel prep was adequate  The patient experienced no blood loss  The procedure was not difficult  The patient tolerated the procedure well  There were no apparent complications   ANESTHESIA INFORMATION: ASA: III Anesthesia Type: IV Sedation with Anesthesia MEDICATIONS: simethicone (MYLICON) 20 mg in sterile water 180 mL 20 mg (Totals for administrations occurring from 0844 to 0922 on 01/03/23) FINDINGS: Lipoma with pillow sign measuring 1 to 4 cm in the proximal ascending colon; performed partial removal by cold forceps biopsy The ileocecal valve, cecum, hepatic flexure, transverse colon, splenic flexure, descending colon, sigmoid colon and rectosigmoid appeared normal  Few small, mild scattered diverticula in the sigmoid colon Large, internal hemorrhoids; no bleeding was identified EVENTS: Procedure Events Event Event Time ENDO SCOPE OUT TIME 1/3/2023  8:57 AM ENDO CECUM REACHED 1/3/2023  9:08 AM ENDO SCOPE OUT TIME 1/3/2023  9:18 AM SPECIMENS: ID Type Source Tests Collected by Time Destination 1 : Bx r/o H  Pylori Tissue Stomach TISSUE EXAM Sarah Alves DO 1/3/2023 7304  2 : Bx lipoma Tissue Large Intestine, Right/Ascending Colon TISSUE EXAM Cee Mcdowell MD 1/3/2023 0912  EQUIPMENT: Colonoscope -[id number] ENDOCUFF VISION MED BLUE ID [id number]     Impression: 3 cm lipoma noted in the ascending colon  Cold biopsies were performed  Large internal hemorrhoids Mild sigmoid diverticulosis Otherwise normal colonoscopy   RECOMMENDATION:  No further screening colonoscopies necessary  Age greater than 72   Await pathology results   Cee Mcdowell MD

## 2023-02-03 ENCOUNTER — OFFICE VISIT (OUTPATIENT)
Dept: CARDIOLOGY CLINIC | Facility: CLINIC | Age: 73
End: 2023-02-03

## 2023-02-03 VITALS
BODY MASS INDEX: 44.65 KG/M2 | DIASTOLIC BLOOD PRESSURE: 80 MMHG | HEART RATE: 64 BPM | HEIGHT: 63 IN | WEIGHT: 252 LBS | SYSTOLIC BLOOD PRESSURE: 130 MMHG | RESPIRATION RATE: 16 BRPM | OXYGEN SATURATION: 97 %

## 2023-02-03 DIAGNOSIS — I35.0 AORTIC VALVE STENOSIS, ETIOLOGY OF CARDIAC VALVE DISEASE UNSPECIFIED: ICD-10-CM

## 2023-02-03 DIAGNOSIS — I10 PRIMARY HYPERTENSION: Primary | ICD-10-CM

## 2023-02-03 DIAGNOSIS — E78.00 PURE HYPERCHOLESTEROLEMIA: ICD-10-CM

## 2023-02-03 RX ORDER — ATORVASTATIN CALCIUM 40 MG/1
40 TABLET, FILM COATED ORAL EVERY EVENING
Qty: 90 TABLET | Refills: 3 | Status: SHIPPED | OUTPATIENT
Start: 2023-02-03

## 2023-02-03 RX ORDER — CARVEDILOL 25 MG/1
25 TABLET ORAL 2 TIMES DAILY WITH MEALS
Qty: 180 TABLET | Refills: 3 | Status: SHIPPED | OUTPATIENT
Start: 2023-02-03 | End: 2023-02-03 | Stop reason: SDUPTHER

## 2023-02-03 RX ORDER — CARVEDILOL 25 MG/1
25 TABLET ORAL 2 TIMES DAILY WITH MEALS
Qty: 180 TABLET | Refills: 3 | Status: SHIPPED | OUTPATIENT
Start: 2023-02-03

## 2023-02-03 NOTE — PROGRESS NOTES
[facility]  [phone]  Cardiology Follow Up    Jose Enrique Mistry  1950  [id number]    1  Primary hypertension  carvedilol (COREG) 25 mg tablet    CBC and differential    Comprehensive metabolic panel      2  Pure hypercholesterolemia  atorvastatin (LIPITOR) 40 mg tablet      3  Aortic valve stenosis, etiology of cardiac valve disease unspecified            Discussion/Summary:  1  Hypertension, initially high 164/100 did come down to 130/80  Will increase Coreg from 12 5 twice daily to 25 twice daily  New prescription sent  Continue to monitor blood pressure  Continue with salt restriction  Check routine labs  2   Hyperlipidemia, continue with Lipitor  3   Aortic stenosis mild per last echo, June 2022, EF 60%  Continue all medications  Report any symptoms  Follow with PCP  Refills given  All questions answered  Continue all medications  Previous studies reviewed with patient, medications reviewed and possible side effects discussed  Continue risk factor modification  Optimize weight, regular exercise and follow up with appropriate specialists and primary care physician as discussed  All questions answered  Patient advised to report any problems prompting to medical attention  Return for follow up visit in 6 months or earlier if needed    Chief Complaint   Patient presents with   • Follow-up     F/u       Interval History:  patient presents for routine follow-up visit with history of hypertension, hyperlipidemia, aortic stenosis  Its been almost 3 years since patient was seen  Patient states he has been feeling good from a cardiac standpoint  Denies any chest pain, chest pressure, chest heaviness  She does note her blood pressure has been running high even at home  She also states she is due to have eye surgery next month  Patient states that her ability to do things is impaired because of her fibromyalgia       S medical history: Hypertension, hyperlipidemia, aortic stenosis    Patient Active Problem List   Diagnosis   • GERD (gastroesophageal reflux disease)   • Colon cancer screening   • Chest pain   • Obesity, morbid (HCC)   • Hypertension   • Chronic left shoulder pain   • Smoldering myeloma     Past Medical History:   Diagnosis Date   • GERD (gastroesophageal reflux disease)    • Obesity      Social History     Socioeconomic History   • Marital status: /Civil Union     Spouse name: Not on file   • Number of children: Not on file   • Years of education: Not on file   • Highest education level: Not on file   Occupational History   • Not on file   Tobacco Use   • Smoking status: Never   • Smokeless tobacco: Never   Vaping Use   • Vaping Use: Never used   Substance and Sexual Activity   • Alcohol use: Yes     Comment: social   • Drug use: Never   • Sexual activity: Not Currently   Other Topics Concern   • Not on file   Social History Narrative   • Not on file     Social Determinants of Health     Financial Resource Strain: Not on file   Food Insecurity: Not on file   Transportation Needs: Not on file   Physical Activity: Not on file   Stress: Not on file   Social Connections: Not on file   Intimate Partner Violence: Not on file   Housing Stability: Not on file      Family History   Problem Relation Age of Onset   • Hypertension Mother    • Arthritis Mother    • Heart disease Father    • No Known Problems Maternal Grandmother    • No Known Problems Maternal Grandfather    • No Known Problems Paternal Grandmother    • No Known Problems Paternal Grandfather    • No Known Problems Maternal Aunt    • No Known Problems Maternal Aunt      Past Surgical History:   Procedure Laterality Date   • CARDIAC CATHETERIZATION  2020   •  SECTION     • EGD AND COLONOSCOPY  2023   • ESOPHAGOGASTRODUODENOSCOPY     • IR BIOPSY BONE MARROW  2022   • OVARIAN CYST REMOVAL         Current Outpatient Medications:   • atorvastatin (LIPITOR) 40 mg tablet, Take 1 tablet (40 mg total) by mouth every evening, Disp: 90 tablet, Rfl: 3  •  bepotastine besilate (BEPREVE) 1 5 % op soln, Administer 1 drop to both eyes daily, Disp: , Rfl:   •  carvedilol (COREG) 25 mg tablet, Take 1 tablet (25 mg total) by mouth 2 (two) times a day with meals, Disp: 180 tablet, Rfl: 3  •  cetirizine (ZyrTEC) 10 mg tablet, Take 1 tablet (10 mg total) by mouth daily, Disp: 90 tablet, Rfl: 0  •  esomeprazole (NexIUM) 20 mg capsule, Take 1 capsule (20 mg total) by mouth daily in the early morning, Disp: 90 capsule, Rfl: 1  •  Multiple Vitamin (Multi Vitamin Daily) TABS, Take by mouth, Disp: , Rfl:   •  Plant Sterols and Stanols (CHOLESTOFF PO), Take 1 tablet by mouth daily, Disp: , Rfl:   •  Coenzyme Q10 (CoQ10) 100 MG CAPS, Take by mouth (Patient not taking: Reported on 1/24/2023), Disp: , Rfl:   Allergies   Allergen Reactions   • Penicillins      dizziness   • Seasonal Ic [Cholestatin] Sneezing and Eye Swelling     Does take zertec   • Latex Rash         Imaging: No results found  Review of Systems:  Review of Systems   Constitutional: Negative  Respiratory: Negative  Cardiovascular: Negative  Musculoskeletal: Positive for arthralgias  Neurological: Negative  Hematological: Negative  Psychiatric/Behavioral: Negative  All other systems reviewed and are negative  /80   Pulse 64   Resp 16   Ht 5' 3" (1 6 m)   Wt 114 kg (252 lb)   SpO2 97%   BMI 44 64 kg/m²     Physical Exam:  Physical Exam  Vitals and nursing note reviewed  Exam conducted with a chaperone present  Constitutional:       Appearance: Normal appearance  HENT:      Head: Normocephalic and atraumatic  Cardiovascular:      Rate and Rhythm: Normal rate and regular rhythm  Pulses: Normal pulses  Heart sounds: Murmur heard  Pulmonary:      Effort: Pulmonary effort is normal       Breath sounds: Normal breath sounds     Musculoskeletal: General: Swelling present  Normal range of motion  Cervical back: Normal range of motion and neck supple  Comments: +_trace edema   Skin:     General: Skin is warm and dry  Neurological:      General: No focal deficit present  Mental Status: She is alert and oriented to person, place, and time  Psychiatric:         Mood and Affect: Mood normal          Behavior: Behavior normal          Thought Content:  Thought content normal          Judgment: Judgment normal

## 2023-02-03 NOTE — PATIENT INSTRUCTIONS
Continue all medications  Report any symptoms  Follow with PCP  Refills given  All questions answered  Continue all medications  Previous studies reviewed with patient, medications reviewed and possible side effects discussed  Continue risk factor modification  Optimize weight, regular exercise and follow up with appropriate specialists and primary care physician as discussed  All questions answered  Patient advised to report any problems prompting to medical attention   Return for follow up visit in 6 months or earlier if needed

## 2023-02-06 ENCOUNTER — HOSPITAL ENCOUNTER (OUTPATIENT)
Facility: HOSPITAL | Age: 73
Setting detail: OBSERVATION
Discharge: HOME | End: 2023-02-07
Attending: EMERGENCY MEDICINE | Admitting: INTERNAL MEDICINE

## 2023-02-06 DIAGNOSIS — I10 UNCONTROLLED HYPERTENSION: Primary | ICD-10-CM

## 2023-02-06 PROBLEM — I16.0 HYPERTENSIVE URGENCY: Status: ACTIVE | Noted: 2023-02-06

## 2023-02-06 PROBLEM — E78.5 HYPERLIPIDEMIA: Status: ACTIVE | Noted: 2023-02-06

## 2023-02-06 LAB
2HR DELTA HS TROPONIN: 0 NG/L
4HR DELTA HS TROPONIN: 0 NG/L
ANION GAP SERPL CALCULATED.3IONS-SCNC: 8 MMOL/L (ref 4–13)
BASOPHILS # BLD AUTO: 0.07 THOUSANDS/ÂΜL (ref 0–0.1)
BASOPHILS NFR BLD AUTO: 1 % (ref 0–1)
BUN SERPL-MCNC: 10 MG/DL (ref 5–25)
CALCIUM SERPL-MCNC: 9.1 MG/DL (ref 8.3–10.1)
CARDIAC TROPONIN I PNL SERPL HS: 6 NG/L
CHLORIDE SERPL-SCNC: 104 MMOL/L (ref 96–108)
CO2 SERPL-SCNC: 28 MMOL/L (ref 21–32)
CREAT SERPL-MCNC: 0.88 MG/DL (ref 0.6–1.3)
EOSINOPHIL # BLD AUTO: 0.46 THOUSAND/ÂΜL (ref 0–0.61)
EOSINOPHIL NFR BLD AUTO: 6 % (ref 0–6)
ERYTHROCYTE [DISTWIDTH] IN BLOOD BY AUTOMATED COUNT: 14.3 % (ref 11.6–15.1)
FLUAV RNA RESP QL NAA+PROBE: NEGATIVE
FLUBV RNA RESP QL NAA+PROBE: NEGATIVE
GFR SERPL CREATININE-BSD FRML MDRD: 66 ML/MIN/1.73SQ M
GLUCOSE SERPL-MCNC: 85 MG/DL (ref 65–140)
HCT VFR BLD AUTO: 41.9 % (ref 34.8–46.1)
HGB BLD-MCNC: 13.5 G/DL (ref 11.5–15.4)
IMM GRANULOCYTES # BLD AUTO: 0.01 THOUSAND/UL (ref 0–0.2)
IMM GRANULOCYTES NFR BLD AUTO: 0 % (ref 0–2)
LYMPHOCYTES # BLD AUTO: 1.38 THOUSANDS/ÂΜL (ref 0.6–4.47)
LYMPHOCYTES NFR BLD AUTO: 19 % (ref 14–44)
MCH RBC QN AUTO: 29.3 PG (ref 26.8–34.3)
MCHC RBC AUTO-ENTMCNC: 32.2 G/DL (ref 31.4–37.4)
MCV RBC AUTO: 91 FL (ref 82–98)
MONOCYTES # BLD AUTO: 0.6 THOUSAND/ÂΜL (ref 0.17–1.22)
MONOCYTES NFR BLD AUTO: 8 % (ref 4–12)
NEUTROPHILS # BLD AUTO: 4.75 THOUSANDS/ÂΜL (ref 1.85–7.62)
NEUTS SEG NFR BLD AUTO: 66 % (ref 43–75)
NRBC BLD AUTO-RTO: 0 /100 WBCS
PLATELET # BLD AUTO: 285 THOUSANDS/UL (ref 149–390)
PMV BLD AUTO: 9.4 FL (ref 8.9–12.7)
POTASSIUM SERPL-SCNC: 3.6 MMOL/L (ref 3.5–5.3)
RBC # BLD AUTO: 4.6 MILLION/UL (ref 3.81–5.12)
RSV RNA RESP QL NAA+PROBE: NEGATIVE
SARS-COV-2 RNA RESP QL NAA+PROBE: NEGATIVE
SODIUM SERPL-SCNC: 140 MMOL/L (ref 135–147)
WBC # BLD AUTO: 7.27 THOUSAND/UL (ref 4.31–10.16)

## 2023-02-06 RX ORDER — HYDRALAZINE HYDROCHLORIDE 20 MG/ML
10 INJECTION INTRAMUSCULAR; INTRAVENOUS ONCE
Status: COMPLETED | OUTPATIENT
Start: 2023-02-06 | End: 2023-02-06

## 2023-02-06 RX ORDER — CARVEDILOL 12.5 MG/1
25 TABLET ORAL 2 TIMES DAILY WITH MEALS
Status: DISCONTINUED | OUTPATIENT
Start: 2023-02-07 | End: 2023-02-07 | Stop reason: HOSPADM

## 2023-02-06 RX ORDER — KETOROLAC TROMETHAMINE 30 MG/ML
15 INJECTION, SOLUTION INTRAMUSCULAR; INTRAVENOUS ONCE
Status: COMPLETED | OUTPATIENT
Start: 2023-02-06 | End: 2023-02-06

## 2023-02-06 RX ORDER — LABETALOL HYDROCHLORIDE 5 MG/ML
10 INJECTION, SOLUTION INTRAVENOUS ONCE
Status: COMPLETED | OUTPATIENT
Start: 2023-02-06 | End: 2023-02-06

## 2023-02-06 RX ORDER — ATORVASTATIN CALCIUM 40 MG/1
40 TABLET, FILM COATED ORAL EVERY EVENING
Status: DISCONTINUED | OUTPATIENT
Start: 2023-02-06 | End: 2023-02-07 | Stop reason: HOSPADM

## 2023-02-06 RX ORDER — PANTOPRAZOLE SODIUM 40 MG/1
40 TABLET, DELAYED RELEASE ORAL
Status: DISCONTINUED | OUTPATIENT
Start: 2023-02-07 | End: 2023-02-07 | Stop reason: HOSPADM

## 2023-02-06 RX ORDER — ENOXAPARIN SODIUM 100 MG/ML
40 INJECTION SUBCUTANEOUS EVERY 12 HOURS SCHEDULED
Status: DISCONTINUED | OUTPATIENT
Start: 2023-02-06 | End: 2023-02-07 | Stop reason: HOSPADM

## 2023-02-06 RX ORDER — ALBUMIN, HUMAN INJ 5% 5 %
12.5 SOLUTION INTRAVENOUS ONCE
Status: COMPLETED | OUTPATIENT
Start: 2023-02-06 | End: 2023-02-07

## 2023-02-06 RX ORDER — ENOXAPARIN SODIUM 100 MG/ML
40 INJECTION SUBCUTANEOUS DAILY
Status: DISCONTINUED | OUTPATIENT
Start: 2023-02-07 | End: 2023-02-06

## 2023-02-06 RX ORDER — IBUPROFEN 400 MG/1
400 TABLET ORAL EVERY 6 HOURS PRN
Status: DISCONTINUED | OUTPATIENT
Start: 2023-02-06 | End: 2023-02-07 | Stop reason: HOSPADM

## 2023-02-06 RX ORDER — LABETALOL HYDROCHLORIDE 5 MG/ML
10 INJECTION, SOLUTION INTRAVENOUS EVERY 6 HOURS PRN
Status: DISCONTINUED | OUTPATIENT
Start: 2023-02-06 | End: 2023-02-07

## 2023-02-06 RX ADMIN — SODIUM CHLORIDE 1000 ML: 0.9 INJECTION, SOLUTION INTRAVENOUS at 23:22

## 2023-02-06 RX ADMIN — KETOROLAC TROMETHAMINE 15 MG: 30 INJECTION, SOLUTION INTRAMUSCULAR at 20:48

## 2023-02-06 RX ADMIN — LABETALOL HYDROCHLORIDE 10 MG: 5 INJECTION, SOLUTION INTRAVENOUS at 19:08

## 2023-02-06 RX ADMIN — HYDRALAZINE HYDROCHLORIDE 10 MG: 20 INJECTION INTRAMUSCULAR; INTRAVENOUS at 20:48

## 2023-02-06 RX ADMIN — HYDRALAZINE HYDROCHLORIDE 10 MG: 20 INJECTION INTRAMUSCULAR; INTRAVENOUS at 19:07

## 2023-02-06 RX ADMIN — ALBUMIN (HUMAN) 12.5 G: 12.5 INJECTION, SOLUTION INTRAVENOUS at 23:37

## 2023-02-06 RX ADMIN — IBUPROFEN 400 MG: 400 TABLET ORAL at 23:28

## 2023-02-06 NOTE — ED NOTES
Patient ambulated to the restroom independently and back to her assigned room with a steady gait        Britney Huynh RN  02/06/23 0629

## 2023-02-07 ENCOUNTER — APPOINTMENT (OUTPATIENT)
Dept: CT IMAGING | Facility: HOSPITAL | Age: 73
End: 2023-02-07

## 2023-02-07 VITALS
DIASTOLIC BLOOD PRESSURE: 64 MMHG | TEMPERATURE: 97.4 F | SYSTOLIC BLOOD PRESSURE: 137 MMHG | OXYGEN SATURATION: 95 % | RESPIRATION RATE: 21 BRPM | HEART RATE: 69 BPM

## 2023-02-07 LAB
ANION GAP SERPL CALCULATED.3IONS-SCNC: 11 MMOL/L (ref 4–13)
ATRIAL RATE: 65 BPM
ATRIAL RATE: 71 BPM
ATRIAL RATE: 75 BPM
BUN SERPL-MCNC: 11 MG/DL (ref 5–25)
CALCIUM SERPL-MCNC: 9 MG/DL (ref 8.3–10.1)
CHLORIDE SERPL-SCNC: 107 MMOL/L (ref 96–108)
CO2 SERPL-SCNC: 23 MMOL/L (ref 21–32)
CREAT SERPL-MCNC: 0.77 MG/DL (ref 0.6–1.3)
ERYTHROCYTE [DISTWIDTH] IN BLOOD BY AUTOMATED COUNT: 14.1 % (ref 11.6–15.1)
GFR SERPL CREATININE-BSD FRML MDRD: 78 ML/MIN/1.73SQ M
GLUCOSE P FAST SERPL-MCNC: 119 MG/DL (ref 65–99)
GLUCOSE SERPL-MCNC: 119 MG/DL (ref 65–140)
HCT VFR BLD AUTO: 39.3 % (ref 34.8–46.1)
HGB BLD-MCNC: 13 G/DL (ref 11.5–15.4)
MCH RBC QN AUTO: 30.4 PG (ref 26.8–34.3)
MCHC RBC AUTO-ENTMCNC: 33.1 G/DL (ref 31.4–37.4)
MCV RBC AUTO: 92 FL (ref 82–98)
P AXIS: 63 DEGREES
P AXIS: 72 DEGREES
P AXIS: 74 DEGREES
PLATELET # BLD AUTO: 255 THOUSANDS/UL (ref 149–390)
PMV BLD AUTO: 9.2 FL (ref 8.9–12.7)
POTASSIUM SERPL-SCNC: 3.5 MMOL/L (ref 3.5–5.3)
PR INTERVAL: 178 MS
PR INTERVAL: 182 MS
PR INTERVAL: 186 MS
QRS AXIS: 58 DEGREES
QRS AXIS: 58 DEGREES
QRS AXIS: 60 DEGREES
QRSD INTERVAL: 86 MS
QRSD INTERVAL: 88 MS
QRSD INTERVAL: 92 MS
QT INTERVAL: 416 MS
QT INTERVAL: 426 MS
QT INTERVAL: 426 MS
QTC INTERVAL: 443 MS
QTC INTERVAL: 452 MS
QTC INTERVAL: 475 MS
RBC # BLD AUTO: 4.28 MILLION/UL (ref 3.81–5.12)
SODIUM SERPL-SCNC: 141 MMOL/L (ref 135–147)
T WAVE AXIS: 55 DEGREES
T WAVE AXIS: 56 DEGREES
T WAVE AXIS: 62 DEGREES
VENTRICULAR RATE: 65 BPM
VENTRICULAR RATE: 71 BPM
VENTRICULAR RATE: 75 BPM
WBC # BLD AUTO: 8.16 THOUSAND/UL (ref 4.31–10.16)

## 2023-02-07 RX ORDER — KETOROLAC TROMETHAMINE 30 MG/ML
15 INJECTION, SOLUTION INTRAMUSCULAR; INTRAVENOUS ONCE
Status: COMPLETED | OUTPATIENT
Start: 2023-02-07 | End: 2023-02-07

## 2023-02-07 RX ORDER — AMLODIPINE BESYLATE 5 MG/1
5 TABLET ORAL DAILY
Qty: 30 TABLET | Refills: 0 | Status: SHIPPED | OUTPATIENT
Start: 2023-02-08 | End: 2023-03-10

## 2023-02-07 RX ORDER — ONDANSETRON 2 MG/ML
4 INJECTION INTRAMUSCULAR; INTRAVENOUS ONCE
Status: DISCONTINUED | OUTPATIENT
Start: 2023-02-07 | End: 2023-02-07 | Stop reason: HOSPADM

## 2023-02-07 RX ORDER — MAGNESIUM HYDROXIDE/ALUMINUM HYDROXICE/SIMETHICONE 120; 1200; 1200 MG/30ML; MG/30ML; MG/30ML
30 SUSPENSION ORAL ONCE
Status: COMPLETED | OUTPATIENT
Start: 2023-02-07 | End: 2023-02-07

## 2023-02-07 RX ORDER — MAGNESIUM SULFATE HEPTAHYDRATE 40 MG/ML
2 INJECTION, SOLUTION INTRAVENOUS ONCE
Status: COMPLETED | OUTPATIENT
Start: 2023-02-07 | End: 2023-02-07

## 2023-02-07 RX ORDER — AMLODIPINE BESYLATE 5 MG/1
5 TABLET ORAL DAILY
Status: DISCONTINUED | OUTPATIENT
Start: 2023-02-07 | End: 2023-02-07 | Stop reason: HOSPADM

## 2023-02-07 RX ORDER — HYDRALAZINE HYDROCHLORIDE 20 MG/ML
5 INJECTION INTRAMUSCULAR; INTRAVENOUS ONCE
Status: COMPLETED | OUTPATIENT
Start: 2023-02-07 | End: 2023-02-07

## 2023-02-07 RX ORDER — HYDRALAZINE HYDROCHLORIDE 20 MG/ML
5 INJECTION INTRAMUSCULAR; INTRAVENOUS EVERY 6 HOURS PRN
Status: DISCONTINUED | OUTPATIENT
Start: 2023-02-07 | End: 2023-02-07 | Stop reason: HOSPADM

## 2023-02-07 RX ADMIN — CARVEDILOL 25 MG: 12.5 TABLET, FILM COATED ORAL at 06:47

## 2023-02-07 RX ADMIN — AMLODIPINE BESYLATE 5 MG: 5 TABLET ORAL at 08:43

## 2023-02-07 RX ADMIN — MAGNESIUM SULFATE HEPTAHYDRATE 2 G: 40 INJECTION, SOLUTION INTRAVENOUS at 03:40

## 2023-02-07 RX ADMIN — PANTOPRAZOLE SODIUM 40 MG: 40 TABLET, DELAYED RELEASE ORAL at 05:04

## 2023-02-07 RX ADMIN — HYDRALAZINE HYDROCHLORIDE 5 MG: 20 INJECTION INTRAMUSCULAR; INTRAVENOUS at 02:30

## 2023-02-07 RX ADMIN — ALUMINA, MAGNESIA, AND SIMETHICONE ORAL SUSPENSION REGULAR STRENGTH 30 ML: 1200; 1200; 120 SUSPENSION ORAL at 03:40

## 2023-02-07 RX ADMIN — KETOROLAC TROMETHAMINE 15 MG: 30 INJECTION, SOLUTION INTRAMUSCULAR at 02:30

## 2023-02-07 RX ADMIN — ENOXAPARIN SODIUM 40 MG: 40 INJECTION SUBCUTANEOUS at 08:17

## 2023-02-07 NOTE — DISCHARGE INSTR - AVS FIRST PAGE
Check your blood pressure daily at home and if systolic BP (the upper number) is higher than 140, you should continue taking amlodipine  Cont coreg 25mg BID   Followup with your PCP in 1 week

## 2023-02-07 NOTE — QUICK NOTE
Patient with persistent HA overnight despite receiving toradol, ibuprofen, IV magnesium  Likely secondary to hypertension; however, cannot r/o intracranial pathology  Will obtain CT head

## 2023-02-07 NOTE — DISCHARGE SUMMARY
[address] [facility]  Discharge- Cy Jerome 1950, 81 y o  female MRN: [id number]  Unit/Bed#: ED 25 Encounter: [id number]  Primary Care Provider: Atiya Kaur DO   Date and time admitted to hospital: 2/6/2023  6:21 PM    * Hypertensive urgency  Assessment & Plan  · /64 (BP Location: Right arm)   Pulse 69   Temp (!) 97 4 °F (36 3 °C) (Temporal)   Resp 21   SpO2 95%   · Hypertensive urgency has resolved  · CT head does not show any hemorrhage or signs of stroke  · Plan is to continue Coreg 25 mg twice daily and in addition add 5 mg of amlodipine  · Patient has been recommended to check her blood pressure every day at home and if the blood pressures systolic is less than 932 and she should hold her medication dose but if the blood pressures are controlled between 120 and 140 with Coreg then she does not need the amlodipine  If her blood pressures are more than 140 even after taking Coreg then she should continue amlodipine  She has also been told to follow-up with her primary care physician within a week for further recommendations on continuation of amlodipine      Hyperlipidemia  Assessment & Plan  · Continue statin therapy  · Follow outpatient    Obesity, morbid (HCC)  Assessment & Plan  · Lifestyle and dietary changes recommended    GERD (gastroesophageal reflux disease)  Assessment & Plan  · Stable, continue daily PPI        Medical Problems     Resolved Problems  Date Reviewed: 2/7/2023   None       Discharging Physician / Practitioner: Alona Massey MD  PCP: Atiya Kaur DO  Admission Date:   Admission Orders (From admission, onward)     Ordered        02/06/23 2103  Place in Observation  Once                      Discharge Date: 02/07/23    Consultations During Hospital Stay:  · None      Procedures Performed:   · none    Significant Findings / Test Results:   · Elevated blood pressure findings    Incidental Findings:   · none    Test Results Pending at Discharge (will require follow up): · None     Outpatient Tests Requested:  · None    Complications: None    Reason for Admission: "Kang Navarro is a 81 y o  female who presents to the ED for evaluation of high blood pressure  She has been on 12 5mg Carvedilol daily for many years and feels recently that it has not been effective  She had a visit with her PCP and her dose had been recently adjusted to 25mg BID, which she states has still not been effective  Due to elevated BP readings with associated headache and lightheadedness this afternoon she decided to come to the ED  Patient requiring admission for hypertensive urgency  All patient questions answered to the best of my ability "-HPI copied from admission       Hospital Course:   Kang Navarro is a 81 y o  female patient who originally presented to the hospital on 2/6/2023 due to hypertensive urgency  CT head was negative for any intracranial abnormalities  Patient was treated with IV medications  Blood pressure today is normal   Coreg has been continued at 25 mg twice a day and amlodipine has been normal this morning  Patient is asymptomatic, denies any chest pain shortness of breath nausea vomiting diarrhea or abdominal pain  Headache is much better but is still present at 2/10, treated with Tylenol  Patient is cleared for discharge with close outpatient follow-up with PCP for blood pressure management    Please see above list of diagnoses and related plan for additional information       Condition at Discharge: stable    Discharge Day Visit / Exam:   Subjective:  " I am fine, no chest pain, shortness of breath"  Vitals: Blood Pressure: 137/64 (02/07/23 0900)  Pulse: 69 (02/07/23 0900)  Temperature: (!) 97 4 °F (36 3 °C) (02/06/23 1813)  Temp Source: Oral (02/07/23 0300)  Respirations: 21 (02/07/23 0900)  SpO2: 95 % (02/07/23 0900)  Exam:   Physical Exam General- Awake, alert and oriented x 3, looks comfortable  HEENT- Normocephalic, atraumatic, oral mucosa- moist  Neck- Supple, No carotid bruit, no JVD  CVS- Normal S1/ S2, Regular rate and rhythm, No murmur, No edema  Respiratory system- B/L clear breath sounds, no wheezing  Abdomen- Soft, Non distended, no tenderness, Bowel sound- present 4 quads  Genitourinary- No suprapubic tenderness, No CVA tenderness  Skin- No new bruise or rash  Musculoskeletal- No gross deformity  Psych- No acute psychosis  CNS- CN II- XII grossly intact, No acute focal neurologic deficit noted      Discussion with Family: Updated  () at bedside  Discharge instructions/Information to patient and family:   See after visit summary for information provided to patient and family  Provisions for Follow-Up Care:  See after visit summary for information related to follow-up care and any pertinent home health orders  Disposition:   Home    Planned Readmission: no     Discharge Statement:  I spent 65 minutes discharging the patient  This time was spent on the day of discharge  I had direct contact with the patient on the day of discharge  Greater than 50% of the total time was spent examining patient, answering all patient questions, arranging and discussing plan of care with patient as well as directly providing post-discharge instructions  Additional time then spent on discharge activities  Discharge Medications:  See after visit summary for reconciled discharge medications provided to patient and/or family        **Please Note: This note may have been constructed using a voice recognition system**

## 2023-02-07 NOTE — ED NOTES
While obtaining the patients 4 hour troponin, patient C/O to this RN that she felt dizzy, her head felt heavy and she did not feel well  BP at the time was 126/60, HR of 71, RR 13, 98% SA02  Notified provider as per monitoring requirements  Provider gave verbal order for 1L of fluid and albumin  Provider to come to bedside for evaluation       Leola Ty RN  02/06/23 7892

## 2023-02-07 NOTE — UTILIZATION REVIEW
Initial Clinical Review    Admission: Date/Time/Statement:   Admission Orders (From admission, onward)     Ordered        02/06/23 2103  Place in Observation  Once                      Orders Placed This Encounter   Procedures   • Place in Observation     Standing Status:   Standing     Number of Occurrences:   1     Order Specific Question:   Level of Care     Answer:   Med Surg [16]     ED Arrival Information     Expected   -    Arrival   2/6/2023 18:07    Acuity   Urgent            Means of arrival   Walk-In    Escorted by   Family Member    Service   Hospitalist    Admission type   Emergency            Arrival complaint   Elevated Blood Pressure Reading            Chief Complaint   Patient presents with   • Hypertension     Pt reports her BP @ home was 195/111  Was seen by cardiology on Friday and they increased her coreg  Hx of HTN  Pt reporting that she feels lightheaded and short of breath  Initial Presentation: 81 y o  female who presents to the ED for evaluation of high blood pressure  She has been on 12 5mg Carvedilol daily for many years and feels recently that it has not been effective  She had a visit with her PCP and her dose had been recently adjusted to 25mg BID, which she states has still not been effective  Due to elevated BP readings with associated headache and lightheadedness this afternoon she decided to come to the ED  PMH: GERD, HLD, HTN  Plan: Observation for hypertensive urgency, HLD, GERD: continue home carvedilol, add prn hydralazine, continue home statin and PPI         ED Triage Vitals   Temperature Pulse Respirations Blood Pressure SpO2   02/06/23 1813 02/06/23 1813 02/06/23 1813 02/06/23 1813 02/06/23 1813   (!) 97 4 °F (36 3 °C) 75 19 (!) 225/102 98 %      Temp Source Heart Rate Source Patient Position - Orthostatic VS BP Location FiO2 (%)   02/06/23 1813 02/06/23 1813 02/06/23 1813 02/06/23 1813 --   Temporal Monitor Sitting Left arm       Pain Score       02/06/23 1912 7          Wt Readings from Last 1 Encounters:   02/03/23 114 kg (252 lb)     Additional Vital Signs:     Date/Time Temp Pulse Resp BP MAP (mmHg) SpO2 O2 Device   02/07/23 0800 -- 67 18 168/73 105 97 % None (Room air)   02/07/23 0700 -- 73 18 177/84 Abnormal  120 97 % None (Room air)   02/07/23 0600 -- 73 20 179/67 Abnormal  105 97 % None (Room air)   02/07/23 0400 -- 64 20 168/70 100 95 % None (Room air)   02/07/23 0300 -- 71 20 199/75 Abnormal  117 97 % None (Room air)   02/07/23 0130 -- 68 17 192/80 Abnormal  115 97 % None (Room air)   02/07/23 0100 -- 65 28 Abnormal  181/75 Abnormal  108 98 % None (Room air)   02/07/23 0030 -- 70 14 162/72 103 98 % None (Room air)   02/07/23 0000 -- 75 27 Abnormal  145/75 103 97 % None (Room air)   02/06/23 2345 -- 77 22 179/79 Abnormal  113 99 % None (Room air)   02/06/23 2335 -- 82 22 158/88 -- 99 % None (Room air)   02/06/23 2330 -- 82 20 149/75 107 99 % None (Room air)   02/06/23 2300 -- 71 13 126/60 86 97 % None (Room air)   02/06/23 2230 -- 79 18 140/74 99 96 % --   02/06/23 2030 -- 69 13 185/87 Abnormal  125 99 % None (Room air)   02/06/23 2000 -- 73 12 189/89 Abnormal  127 100 % None (Room air)   02/06/23 1930 -- 68 12 190/86 Abnormal  123 100 % None (Room air)   02/06/23 1912 -- 65 18 -- -- 99 % None (Room air)     Pertinent Labs/Diagnostic Test Results:   CT head wo contrast   Final Result by Camilo Cho MD (02/07 2617)      No acute intracranial abnormality  Nonemergent findings above            Workstation performed: [id number]           Results from last 7 days   Lab Units 02/06/23 2119   SARS-COV-2  Negative     Results from last 7 days   Lab Units 02/07/23  0510 02/06/23  1905   WBC Thousand/uL 8 16 7 27   HEMOGLOBIN g/dL 13 0 13 5   HEMATOCRIT % 39 3 41 9   PLATELETS Thousands/uL 255 285   NEUTROS ABS Thousands/µL  --  4 75         Results from last 7 days   Lab Units 02/07/23  0510 02/06/23  1905   SODIUM mmol/L 141 140   POTASSIUM mmol/L 3 5 3 6   CHLORIDE mmol/L 107 104   CO2 mmol/L 23 28   ANION GAP mmol/L 11 8   BUN mg/dL 11 10   CREATININE mg/dL 0 77 0 88   EGFR ml/min/1 73sq m 78 66   CALCIUM mg/dL 9 0 9 1             Results from last 7 days   Lab Units 02/07/23  0510 02/06/23  1905   GLUCOSE RANDOM mg/dL 119 85         Results from last 7 days   Lab Units 02/06/23 2325 02/06/23 2119 02/06/23  1905   HS TNI 0HR ng/L  --   --  6   HS TNI 2HR ng/L  --  6  --    HSTNI D2 ng/L  --  0  --    HS TNI 4HR ng/L 6  --   --    HSTNI D4 ng/L 0  --   --          Results from last 7 days   Lab Units 02/06/23 2119   INFLUENZA A PCR  Negative   INFLUENZA B PCR  Negative   RSV PCR  Negative         ED Treatment:   Medication Administration from 02/06/2023 1807 to 02/07/2023 0857       Date/Time Order Dose Route Action     02/06/2023 1908 EST labetalol (NORMODYNE) injection 10 mg 10 mg Intravenous Given     02/06/2023 1907 EST hydrALAZINE (APRESOLINE) injection 10 mg 10 mg Intravenous Given     02/06/2023 2048 EST hydrALAZINE (APRESOLINE) injection 10 mg 10 mg Intravenous Given     02/06/2023 2048 EST ketorolac (TORADOL) injection 15 mg 15 mg Intravenous Given     02/07/2023 0647 EST carvedilol (COREG) tablet 25 mg 25 mg Oral Given     02/07/2023 0504 EST pantoprazole (PROTONIX) EC tablet 40 mg 40 mg Oral Given     02/06/2023 2328 EST ibuprofen (MOTRIN) tablet 400 mg 400 mg Oral Given     02/07/2023 0817 EST enoxaparin (LOVENOX) subcutaneous injection 40 mg 40 mg Subcutaneous Given     02/06/2023 2337 EST albumin human (FLEXBUMIN) 5 % injection 12 5 g 12 5 g Intravenous New Bag     02/06/2023 2322 EST sodium chloride 0 9 % bolus 1,000 mL 1,000 mL Intravenous New Bag     02/07/2023 0230 EST ketorolac (TORADOL) injection 15 mg 15 mg Intravenous Given     02/07/2023 0230 EST hydrALAZINE (APRESOLINE) injection 5 mg 5 mg Intravenous Given     02/07/2023 0340 EST magnesium sulfate 2 g/50 mL IVPB (premix) 2 g 2 g Intravenous New Bag     02/07/2023 0340 EST aluminum-magnesium hydroxide-simethicone (MYLANTA) oral suspension 30 mL 30 mL Oral Given     02/07/2023 0843 EST amLODIPine (NORVASC) tablet 5 mg 5 mg Oral Given        Past Medical History:   Diagnosis Date   • GERD (gastroesophageal reflux disease)    • HLD (hyperlipidemia)    • Hypertension    • Obesity      Present on Admission:  • GERD (gastroesophageal reflux disease)      Admitting Diagnosis: Elevated blood pressure reading [R03 0]  Age/Sex: 81 y o  female  Admission Orders:  Scheduled Medications:  amLODIPine, 5 mg, Oral, Daily  atorvastatin, 40 mg, Oral, QPM  carvedilol, 25 mg, Oral, BID With Meals  enoxaparin, 40 mg, Subcutaneous, Q12H LUCI  ondansetron, 4 mg, Intravenous, Once  pantoprazole, 40 mg, Oral, Early Morning      Continuous IV Infusions:     PRN Meds:  hydrALAZINE, 5 mg, Intravenous, Q6H PRN  ibuprofen, 400 mg, Oral, Q6H PRN        None    Network Utilization Review Department  ATTENTION: Please call with any questions or concerns to [phone] and carefully listen to the prompts so that you are directed to the right person  All voicemails are confidential   Kumar Alonso all requests for admission clinical reviews, approved or denied determinations and any other requests to dedicated fax number below belonging to the campus where the patient is receiving treatment   List of dedicated fax numbers for the Facilities:  [address] DENIALS (Administrative/Medical Necessity) [phone]   [address] (Maternity/NICU/Pediatrics) [phone]   [address] Mariia Nance [phone]   [facility]  [phone]   [address] [address] [facility] [place] [place] [address] [place] [address] [address] [phone]

## 2023-02-07 NOTE — ED PROVIDER NOTES
History  Chief Complaint   Patient presents with   • Hypertension     Pt reports her BP @ home was 195/111  Was seen by cardiology on Friday and they increased her coreg  Hx of HTN  Pt reporting that she feels lightheaded and short of breath  This 72-year-old female presents emergency department with uncontrolled hypertension as high as 195/111 at home  She was recently had some medication adjustments for her blood pressure medication carvedilol  She is today having shortness of breath and episodes of chest tightness  When she came in triage her diastolic blood pressure was more than 120  No nausea no vomiting no diarrhea  History provided by:  Patient and spouse   used: No    Hypertension  Severity:  Severe  Associated symptoms: shortness of breath    Associated symptoms: no abdominal pain, no chest pain, no ear pain, no fever, no hematuria, no palpitations and not vomiting        Prior to Admission Medications   Prescriptions Last Dose Informant Patient Reported? Taking?    Coenzyme Q10 (CoQ10) 100 MG CAPS   Yes No   Sig: Take by mouth   Patient not taking: Reported on 1/24/2023   Multiple Vitamin (Multi Vitamin Daily) TABS   Yes No   Sig: Take by mouth   Plant Sterols and Stanols (CHOLESTOFF PO)   Yes No   Sig: Take 1 tablet by mouth daily   atorvastatin (LIPITOR) 40 mg tablet   No No   Sig: Take 1 tablet (40 mg total) by mouth every evening   bepotastine besilate (BEPREVE) 1 5 % op soln   Yes No   Sig: Administer 1 drop to both eyes daily   carvedilol (COREG) 25 mg tablet   No No   Sig: Take 1 tablet (25 mg total) by mouth 2 (two) times a day with meals   cetirizine (ZyrTEC) 10 mg tablet   No No   Sig: Take 1 tablet (10 mg total) by mouth daily   esomeprazole (NexIUM) 20 mg capsule   No No   Sig: Take 1 capsule (20 mg total) by mouth daily in the early morning      Facility-Administered Medications: None       Past Medical History:   Diagnosis Date   • GERD (gastroesophageal reflux disease)    • HLD (hyperlipidemia)    • Hypertension    • Obesity        Past Surgical History:   Procedure Laterality Date   • CARDIAC CATHETERIZATION  2020   •  SECTION     • EGD AND COLONOSCOPY  2023   • ESOPHAGOGASTRODUODENOSCOPY     • IR BIOPSY BONE MARROW  2022   • OVARIAN CYST REMOVAL         Family History   Problem Relation Age of Onset   • Hypertension Mother    • Arthritis Mother    • Heart disease Father    • No Known Problems Maternal Grandmother    • No Known Problems Maternal Grandfather    • No Known Problems Paternal Grandmother    • No Known Problems Paternal Grandfather    • No Known Problems Maternal Aunt    • No Known Problems Maternal Aunt      I have reviewed and agree with the history as documented  E-Cigarette/Vaping   • E-Cigarette Use Never User      E-Cigarette/Vaping Substances   • Nicotine No    • THC No    • CBD No    • Flavoring No    • Other No    • Unknown No      Social History     Tobacco Use   • Smoking status: Never   • Smokeless tobacco: Never   Vaping Use   • Vaping Use: Never used   Substance Use Topics   • Alcohol use: Yes     Comment: social   • Drug use: Never       Review of Systems   Constitutional: Negative for chills and fever  HENT: Negative for ear pain and sore throat  Eyes: Negative for pain and visual disturbance  Respiratory: Positive for chest tightness and shortness of breath  Negative for cough  Cardiovascular: Negative for chest pain and palpitations  Gastrointestinal: Negative for abdominal pain and vomiting  Genitourinary: Negative for dysuria and hematuria  Musculoskeletal: Negative for arthralgias and back pain  Skin: Negative for color change and rash  Neurological: Negative for seizures and syncope  All other systems reviewed and are negative  Physical Exam  Physical Exam  Vitals and nursing note reviewed  Constitutional:       General: She is not in acute distress       Appearance: Normal appearance  She is well-developed  HENT:      Head: Normocephalic and atraumatic  Mouth/Throat:      Mouth: Mucous membranes are moist    Eyes:      Extraocular Movements: Extraocular movements intact  Conjunctiva/sclera: Conjunctivae normal       Pupils: Pupils are equal, round, and reactive to light  Cardiovascular:      Rate and Rhythm: Normal rate and regular rhythm  Pulses: Normal pulses  Heart sounds: Normal heart sounds  No murmur heard  Pulmonary:      Effort: Pulmonary effort is normal  No respiratory distress  Breath sounds: Normal breath sounds  Abdominal:      General: Abdomen is flat  Palpations: Abdomen is soft  Tenderness: There is no abdominal tenderness  Musculoskeletal:         General: No swelling  Cervical back: Neck supple  Right lower leg: No edema  Left lower leg: No edema  Skin:     General: Skin is warm and dry  Capillary Refill: Capillary refill takes less than 2 seconds  Neurological:      General: No focal deficit present  Mental Status: She is alert and oriented to person, place, and time  Psychiatric:         Mood and Affect: Mood normal          Thought Content:  Thought content normal          Judgment: Judgment normal          Vital Signs  ED Triage Vitals   Temperature Pulse Respirations Blood Pressure SpO2   02/06/23 1813 02/06/23 1813 02/06/23 1813 02/06/23 1813 02/06/23 1813   (!) 97 4 °F (36 3 °C) 75 19 (!) 225/102 98 %      Temp Source Heart Rate Source Patient Position - Orthostatic VS BP Location FiO2 (%)   02/06/23 1813 02/06/23 1813 02/06/23 1813 02/06/23 1813 --   Temporal Monitor Sitting Left arm       Pain Score       02/06/23 1912       7           Vitals:    02/06/23 1912 02/06/23 1930 02/06/23 2000 02/06/23 2030   BP:  (!) 190/86 (!) 189/89 (!) 185/87   Pulse: 65 68 73 69   Patient Position - Orthostatic VS: Lying Lying Lying Sitting         Visual Acuity      ED Medications  Medications labetalol (NORMODYNE) injection 10 mg (10 mg Intravenous Given 2/6/23 1908)   hydrALAZINE (APRESOLINE) injection 10 mg (10 mg Intravenous Given 2/6/23 1907)   hydrALAZINE (APRESOLINE) injection 10 mg (10 mg Intravenous Given 2/6/23 2048)   ketorolac (TORADOL) injection 15 mg (15 mg Intravenous Given 2/6/23 2048)       Diagnostic Studies  Results Reviewed     Procedure Component Value Units Date/Time    HS Troponin I 4hr [[id number]]     Lab Status: No result Specimen: Blood     HS Troponin I 2hr [[id number]]     Lab Status: No result Specimen: Blood     HS Troponin 0hr (reflex protocol) [[id number]]  (Normal) Collected: 02/06/23 1905    Lab Status: Final result Specimen: Blood from Arm, Left Updated: 02/06/23 1944     hs TnI 0hr 6 ng/L     Basic metabolic panel [[id number]] Collected: 02/06/23 1905    Lab Status: Final result Specimen: Blood from Arm, Left Updated: 02/06/23 1932     Sodium 140 mmol/L      Potassium 3 6 mmol/L      Chloride 104 mmol/L      CO2 28 mmol/L      ANION GAP 8 mmol/L      BUN 10 mg/dL      Creatinine 0 88 mg/dL      Glucose 85 mg/dL      Calcium 9 1 mg/dL      eGFR 66 ml/min/1 73sq m     Narrative:      Elva guidelines for Chronic Kidney Disease (CKD):   •  Stage 1 with normal or high GFR (GFR > 90 mL/min/1 73 square meters)  •  Stage 2 Mild CKD (GFR = 60-89 mL/min/1 73 square meters)  •  Stage 3A Moderate CKD (GFR = 45-59 mL/min/1 73 square meters)  •  Stage 3B Moderate CKD (GFR = 30-44 mL/min/1 73 square meters)  •  Stage 4 Severe CKD (GFR = 15-29 mL/min/1 73 square meters)  •  Stage 5 End Stage CKD (GFR <15 mL/min/1 73 square meters)  Note: GFR calculation is accurate only with a steady state creatinine    CBC and differential [[id number]] Collected: 02/06/23 1905    Lab Status: Final result Specimen: Blood from Arm, Left Updated: 02/06/23 1921     WBC 7 27 Thousand/uL      RBC 4 60 Million/uL      Hemoglobin 13 5 g/dL      Hematocrit 41 9 %      MCV 91 fL MCH 29 3 pg      MCHC 32 2 g/dL      RDW 14 3 %      MPV 9 4 fL      Platelets 446 Thousands/uL      nRBC 0 /100 WBCs      Neutrophils Relative 66 %      Immat GRANS % 0 %      Lymphocytes Relative 19 %      Monocytes Relative 8 %      Eosinophils Relative 6 %      Basophils Relative 1 %      Neutrophils Absolute 4 75 Thousands/µL      Immature Grans Absolute 0 01 Thousand/uL      Lymphocytes Absolute 1 38 Thousands/µL      Monocytes Absolute 0 60 Thousand/µL      Eosinophils Absolute 0 46 Thousand/µL      Basophils Absolute 0 07 Thousands/µL                  No orders to display              Procedures  Procedures         ED Course  ED Course as of 02/06/23 2105   [facility] Feb 06, 2023 2022 hs TnI 0hr: 6   2022 WBC: 7 27   2022 Hemoglobin: 13 5   2022 HCT: 41 9   2022 Sodium: 140   2022 BUN: 10   2022 Creatinine: 0 88                               SBIRT 22yo+    Flowsheet Row Most Recent Value   SBIRT (25 yo +)    In order to provide better care to our patients, we are screening all of our patients for alcohol and drug use  Would it be okay to ask you these screening questions? Yes Filed at: 02/06/2023 2013   Initial Alcohol Screen: US AUDIT-C     1  How often do you have a drink containing alcohol? 1 Filed at: 02/06/2023 2013   2  How many drinks containing alcohol do you have on a typical day you are drinking? 1 Filed at: 02/06/2023 2013   3a  Male UNDER 65: How often do you have five or more drinks on one occasion? 0 Filed at: 02/06/2023 2013   Audit-C Score 2 Filed at: 02/06/2023 2013   TOMMY: How many times in the past year have you    Used an illegal drug or used a prescription medication for non-medical reasons? Never Filed at: 02/06/2023 2013                    Medical Decision Making  Patient has presented to the Emergency Department with exacerbation of chronic condition that may poses a threat to life or body function      At least 3 different tests have been ordered on this patient and reviewed the results  Old laboratory data was reviewed from the medical records and compared to today's results  Discussion with patient and or family members of results (normal and abnormal) and the implications for immediate and long term treatment/management  Need for overnight observation, BP management, and transition to oral medications    Hospitalization was considered in this patient   discussed with hospitalist and agree to ADMIT        Uncontrolled hypertension: chronic illness or injury with severe exacerbation, progression, or side effects of treatment  Amount and/or Complexity of Data Reviewed  Labs: ordered  Decision-making details documented in ED Course  Details: Patient had normal troponin  Normal CBC and BMP  EKG was sinus without ST elevations or depressions no signs of ischemia  Risk  Prescription drug management  Decision regarding hospitalization  Disposition  Final diagnoses:   Uncontrolled hypertension     Time reflects when diagnosis was documented in both MDM as applicable and the Disposition within this note     Time User Action Codes Description Comment    2/6/2023  9:02 PM [facility] [I10[facility] Uncontrolled hypertension       ED Disposition     ED Disposition   Admit    Condition   Stable    Date/Time   Mon Feb 6, 2023  9:02 PM    Comment   Case was discussed with hospitalist and the patient's admission status was agreed to be Admission Status: observation status to the service of Dr Zoey Samaniego   Follow-up Information    None         Patient's Medications   Discharge Prescriptions    No medications on file       No discharge procedures on file      PDMP Review     None          ED Provider  Electronically Signed by           Onah Yoon MD  02/06/23 9765

## 2023-02-07 NOTE — ASSESSMENT & PLAN NOTE
· /64 (BP Location: Right arm)   Pulse 69   Temp (!) 97 4 °F (36 3 °C) (Temporal)   Resp 21   SpO2 95%   · Hypertensive urgency has resolved  · CT head does not show any hemorrhage or signs of stroke  · Plan is to continue Coreg 25 mg twice daily and in addition add 5 mg of amlodipine  · Patient has been recommended to check her blood pressure every day at home and if the blood pressures systolic is less than 311 and she should hold her medication dose but if the blood pressures are controlled between 120 and 140 with Coreg then she does not need the amlodipine  If her blood pressures are more than 140 even after taking Coreg then she should continue amlodipine  She has also been told to follow-up with her primary care physician within a week for further recommendations on continuation of amlodipine

## 2023-02-07 NOTE — ED NOTES
Patient rang for assistance to transfer to bedside commode  Stand by assist  Patient transferred independently after set up and back to recliner  Patient reporting slight headache with no other concerns        Antonio Boudreaux RN  02/07/23 2723

## 2023-02-07 NOTE — ASSESSMENT & PLAN NOTE
Patient presenting to the ED for evaluation of high blood pressure  She has been on 12 5mg Carvedilol daily for many years and feels recently that it has not been effective  She had a visit with her PCP and her dose had been recently adjusted to 25mg BID, which she states has still not been effective  Due to elevated BP readings with associated headache and lightheadedness this afternoon she decided to come to the ED  · Initial /102  · Received IV hydralazine 10mg x2 doses and IV labetolol 10mg x 1 dose in the ED   BP improved to 185/87  · Attempt to lower BP slowly overnight to avoid hypoperfusion  · Continue home carvediolol  · Add PRN labetolol 10mg Q6H prn  · Consider adding additional anti-hypertensive agent to regimen at discharge with close follow up

## 2023-02-07 NOTE — H&P
[address] [place] [address] 1950, 81 y o  female MRN: [id number]  Unit/Bed#: ED 25 Encounter: [id number]  Primary Care Provider: Marivel Oliveira DO   Date and time admitted to hospital: 2/6/2023  6:21 PM    * Hypertensive urgency  Assessment & Plan  Patient presenting to the ED for evaluation of high blood pressure  She has been on 12 5mg Carvedilol daily for many years and feels recently that it has not been effective  She had a visit with her PCP and her dose had been recently adjusted to 25mg BID, which she states has still not been effective  Due to elevated BP readings with associated headache and lightheadedness this afternoon she decided to come to the ED  · Initial /102  · Received IV hydralazine 10mg x2 doses and IV labetolol 10mg x 1 dose in the ED  BP improved to 185/87  · Attempt to lower BP slowly overnight to avoid hypoperfusion  · Continue home carvediolol  · Add PRN hydralazine 5mg Q6H prn  · Consider adding additional anti-hypertensive agent to regimen at discharge with close follow up     Hyperlipidemia  Assessment & Plan  · Continue statin therapy    GERD (gastroesophageal reflux disease)  Assessment & Plan  · Continue daily PPI    VTE Prophylaxis: Enoxaparin (Lovenox)  / sequential compression device   Code Status: Level 1 code  Discussion with family: Update in the AM    Anticipated Length of Stay:  Patient will be admitted on an Observation basis with an anticipated length of stay of  Less than 2 midnights  Justification for Hospital Stay: Hypertensive urgency    Total Time for Visit, including Counseling / Coordination of Care: 90 minutes  Greater than 50% of this total time spent on direct patient counseling and coordination of care  Chief Complaint:   High BP    History of Present Illness:    Shaggy Perez is a 81 y o  female who presents to the ED for evaluation of high blood pressure   She has been on 12 5mg Carvedilol daily for many years and feels recently that it has not been effective  She had a visit with her PCP and her dose had been recently adjusted to 25mg BID, which she states has still not been effective  Due to elevated BP readings with associated headache and lightheadedness this afternoon she decided to come to the ED  Patient requiring admission for hypertensive urgency  All patient questions answered to the best of my ability  Review of Systems:    Review of Systems   Constitutional: Negative for chills and fever  HENT: Negative for ear pain and sore throat  Eyes: Negative for pain and visual disturbance  Respiratory: Negative for cough and shortness of breath  Cardiovascular: Negative for chest pain and palpitations  Gastrointestinal: Negative for abdominal pain and vomiting  Genitourinary: Negative for dysuria and hematuria  Musculoskeletal: Negative for arthralgias and back pain  Skin: Negative for color change and rash  Neurological: Positive for light-headedness and headaches  Negative for seizures and syncope  All other systems reviewed and are negative  Past Medical and Surgical History:     Past Medical History:   Diagnosis Date   • GERD (gastroesophageal reflux disease)    • HLD (hyperlipidemia)    • Hypertension    • Obesity        Past Surgical History:   Procedure Laterality Date   • CARDIAC CATHETERIZATION  2020   •  SECTION     • EGD AND COLONOSCOPY  2023   • ESOPHAGOGASTRODUODENOSCOPY     • IR BIOPSY BONE MARROW  2022   • OVARIAN CYST REMOVAL         Meds/Allergies:    Prior to Admission medications    Medication Sig Start Date End Date Taking?  Authorizing Provider   atorvastatin (LIPITOR) 40 mg tablet Take 1 tablet (40 mg total) by mouth every evening 2/3/23   Johana Delgado PA-C   bepotastine besilate (BEPREVE) 1 5 % op soln Administer 1 drop to both eyes daily    Historical Provider, MD   carvedilol (COREG) 25 mg tablet Take 1 tablet (25 mg total) by mouth 2 (two) times a day with meals 2/3/23   Shital Alejandro PA-C   cetirizine (ZyrTEC) 10 mg tablet Take 1 tablet (10 mg total) by mouth daily 3/25/22   Klaudia Michele DO   Coenzyme Q10 (CoQ10) 100 MG CAPS Take by mouth  Patient not taking: Reported on 1/24/2023    Historical Provider, MD   esomeprazole (NexIUM) 20 mg capsule Take 1 capsule (20 mg total) by mouth daily in the early morning 9/12/22   Kingsley Noland MD   Multiple Vitamin (Multi Vitamin Daily) TABS Take by mouth    Historical Provider, MD   Plant Sterols and Stanols (CHOLESTOFF PO) Take 1 tablet by mouth daily 8/4/20   Historical Provider, MD     I have reviewed home medications using allscripts  Allergies:    Allergies   Allergen Reactions   • Penicillins      dizziness   • Seasonal Ic [Cholestatin] Sneezing and Eye Swelling     Does take zertec   • Latex Rash       Social History:     Marital Status: /Civil Union   Occupation: NA  Patient Pre-hospital Living Situation: Home  Patient Pre-hospital Level of Mobility: Walks  Patient Pre-hospital Diet Restrictions: None  Substance Use History:   Social History     Substance and Sexual Activity   Alcohol Use Yes    Comment: social     Social History     Tobacco Use   Smoking Status Never   Smokeless Tobacco Never     Social History     Substance and Sexual Activity   Drug Use Never       Family History:    Family History   Problem Relation Age of Onset   • Hypertension Mother    • Arthritis Mother    • Heart disease Father    • No Known Problems Maternal Grandmother    • No Known Problems Maternal Grandfather    • No Known Problems Paternal Grandmother    • No Known Problems Paternal Grandfather    • No Known Problems Maternal Aunt    • No Known Problems Maternal Aunt        Physical Exam:     Vitals:   Blood Pressure: 168/70 (02/07/23 0400)  Pulse: 64 (02/07/23 0400)  Temperature: (!) 97 4 °F (36 3 °C) (02/06/23 1813)  Temp Source: Oral (02/07/23 0300)  Respirations: 20 (02/07/23 0400)  SpO2: 95 % (02/07/23 0400)    Physical Exam  Vitals and nursing note reviewed  Constitutional:       General: She is not in acute distress  Appearance: She is well-developed  She is obese  HENT:      Head: Normocephalic and atraumatic  Eyes:      Conjunctiva/sclera: Conjunctivae normal    Cardiovascular:      Rate and Rhythm: Normal rate and regular rhythm  Heart sounds: No murmur heard  Pulmonary:      Effort: Pulmonary effort is normal  No respiratory distress  Breath sounds: Normal breath sounds  Abdominal:      Palpations: Abdomen is soft  Tenderness: There is no abdominal tenderness  Musculoskeletal:         General: No swelling  Cervical back: Neck supple  Skin:     General: Skin is warm and dry  Capillary Refill: Capillary refill takes less than 2 seconds  Neurological:      Mental Status: She is alert and oriented to person, place, and time  Psychiatric:         Mood and Affect: Mood normal          Additional Data:     Lab Results: I have personally reviewed pertinent reports  Results from last 7 days   Lab Units 02/06/23  1905   WBC Thousand/uL 7 27   HEMOGLOBIN g/dL 13 5   HEMATOCRIT % 41 9   PLATELETS Thousands/uL 285   NEUTROS PCT % 66   LYMPHS PCT % 19   MONOS PCT % 8   EOS PCT % 6     Results from last 7 days   Lab Units 02/06/23  1905   SODIUM mmol/L 140   POTASSIUM mmol/L 3 6   CHLORIDE mmol/L 104   CO2 mmol/L 28   BUN mg/dL 10   CREATININE mg/dL 0 88   ANION GAP mmol/L 8   CALCIUM mg/dL 9 1   GLUCOSE RANDOM mg/dL 85                       Imaging: I have personally reviewed pertinent reports  CT head wo contrast    (Results Pending)       EKG, Pathology, and Other Studies Reviewed on Admission:   · EKG: Reviewed    Allscripts / Epic Records Reviewed: Yes     ** Please Note: This note has been constructed using a voice recognition system   **

## 2023-02-07 NOTE — ED NOTES
Patient no longer flushed, dizzy and c/o of her head feeling heavy  BP improved  Patient resting comfortably with no signs of distress       Barbara Eldridge RN  02/06/23 3194

## 2023-02-08 ENCOUNTER — TRANSITIONAL CARE MANAGEMENT (OUTPATIENT)
Dept: FAMILY MEDICINE CLINIC | Facility: CLINIC | Age: 73
End: 2023-02-08

## 2023-02-15 ENCOUNTER — OFFICE VISIT (OUTPATIENT)
Dept: FAMILY MEDICINE CLINIC | Facility: CLINIC | Age: 73
End: 2023-02-15

## 2023-02-15 VITALS
WEIGHT: 252 LBS | BODY MASS INDEX: 44.65 KG/M2 | HEIGHT: 63 IN | DIASTOLIC BLOOD PRESSURE: 82 MMHG | TEMPERATURE: 97 F | SYSTOLIC BLOOD PRESSURE: 122 MMHG | HEART RATE: 54 BPM | OXYGEN SATURATION: 98 %

## 2023-02-15 DIAGNOSIS — I10 PRIMARY HYPERTENSION: Primary | ICD-10-CM

## 2023-02-15 DIAGNOSIS — Z12.31 BREAST CANCER SCREENING BY MAMMOGRAM: ICD-10-CM

## 2023-02-15 NOTE — PROGRESS NOTES
Name: Cy Jerome      : 1950      MRN: [id number]  Encounter Provider: Atiya Kaur DO  Encounter Date: 2/15/2023   Encounter department: [facility] [address] [address]     1  Primary hypertension    2  Breast cancer screening by mammogram  -     Mammo screening bilateral w 3d & cad; Future; Expected date: 2023       Amlodipine 2 5 mg daily, record BP daily and follow up in 2 weeks  Subjective      HPI   Patient presents to the office TCM visit  Patient states that she has not used the amlodipine 5 mg since discharge  Notes that she has been checking BP at home, has been using a wrist cuff and an arm cuff  States that her readings have been up and down  Notes that she     Denies chest pain or SOB  Notes her baseline headaches  Has ortho appointment next week  Has GI appointment coming up  Notes that she is working on weight loss  TCM Call     Date and time call was made  2023 10:52 AM    Hospital care reviewed  Records reviewed    Patient was hospitialized at  [facility]    Date of Admission  23    Date of discharge  23    Diagnosis  Uncontrolled hypertension    Disposition  Home    Were the patients medications reviewed and updated  Yes    Current Symptoms  None      TCM Call     Post hospital issues  None    Should patient be enrolled in anticoag monitoring? No    Scheduled for follow up? Yes    Did you obtain your prescribed medications  Yes    Do you need help managing your prescriptions or medications  No    Is transportation to your appointment needed  No    I have advised the patient to call PCP with any new or worsening symptoms  Ciara Brooks/lorna      Living Arrangements  Family members    Support System  Family    The type of support provided  Emotional    Do you have social support  Yes, as much as I need    Are you recieving any outpatient services  No    Are you recieving home care services No    Are you using any community resources  No    Current waiver services  No    Have you fallen in the last 12 months  No    Interperter language line needed  No    Counseling  Patient    Counseling topics  patient and family education; Activities of daily living; Importance of RX compliance    Comments  Spoke with pt this morning 02/8/23 after her hospital d/c on 02/7/23  Pt stated that she is doing well at home and has been checking her b/p, She stated that she doesn't remember her b/p reading from this morning and I advised her to start a log to bring to her tcm appt on 02/15/23  She was prescribed Amlodipine at her d/c but was advised to take it unless her b/p is high  Pt denied chest pain, sob, vision changes  She does have a mild head ache but said that it is normal for her  Review of Systems    Current Outpatient Medications on File Prior to Visit   Medication Sig   • atorvastatin (LIPITOR) 40 mg tablet Take 1 tablet (40 mg total) by mouth every evening   • bepotastine besilate (BEPREVE) 1 5 % op soln Administer 1 drop to both eyes daily   • carvedilol (COREG) 25 mg tablet Take 1 tablet (25 mg total) by mouth 2 (two) times a day with meals   • Multiple Vitamin (Multi Vitamin Daily) TABS Take by mouth   • Plant Sterols and Stanols (CHOLESTOFF PO) Take 1 tablet by mouth daily   • [DISCONTINUED] esomeprazole (NexIUM) 20 mg capsule Take 1 capsule (20 mg total) by mouth daily in the early morning   • amLODIPine (NORVASC) 5 mg tablet Take 1 tablet (5 mg total) by mouth daily Do not start before February 8, 2023   (Patient not taking: Reported on 2/15/2023)   • cetirizine (ZyrTEC) 10 mg tablet Take 1 tablet (10 mg total) by mouth daily (Patient not taking: Reported on 2/15/2023)     Objective     /82 (BP Location: Left arm, Patient Position: Sitting, Cuff Size: Large)   Pulse (!) 54   Temp (!) 97 °F (36 1 °C)   Ht 5' 3" (1 6 m)   Wt 114 kg (252 lb)   SpO2 98%   BMI 44 64 kg/m²      BP left arm 150/92    Physical Exam  Vitals reviewed  Constitutional:       General: She is not in acute distress  Appearance: Normal appearance  She is obese  HENT:      Head: Normocephalic and atraumatic  Right Ear: External ear normal       Left Ear: External ear normal       Nose: Nose normal       Mouth/Throat:      Mouth: Mucous membranes are moist    Eyes:      Extraocular Movements: Extraocular movements intact  Conjunctiva/sclera: Conjunctivae normal    Cardiovascular:      Rate and Rhythm: Normal rate and regular rhythm  Heart sounds: Normal heart sounds  Pulmonary:      Effort: Pulmonary effort is normal       Breath sounds: Normal breath sounds  No wheezing, rhonchi or rales  Abdominal:      General: Bowel sounds are normal  There is no distension  Palpations: Abdomen is soft  Tenderness: There is no abdominal tenderness  Musculoskeletal:      Cervical back: Neck supple  Right lower leg: No edema  Left lower leg: No edema  Lymphadenopathy:      Cervical: No cervical adenopathy  Skin:     General: Skin is warm  Capillary Refill: Capillary refill takes less than 2 seconds  Findings: No rash  Neurological:      Mental Status: She is alert  Mental status is at baseline            Davian Mixon DO

## 2023-02-16 DIAGNOSIS — K21.9 GASTROESOPHAGEAL REFLUX DISEASE WITHOUT ESOPHAGITIS: ICD-10-CM

## 2023-02-27 ENCOUNTER — OFFICE VISIT (OUTPATIENT)
Dept: OBGYN CLINIC | Facility: CLINIC | Age: 73
End: 2023-02-27

## 2023-02-27 VITALS
HEIGHT: 63 IN | BODY MASS INDEX: 44.83 KG/M2 | DIASTOLIC BLOOD PRESSURE: 97 MMHG | WEIGHT: 253 LBS | HEART RATE: 59 BPM | SYSTOLIC BLOOD PRESSURE: 146 MMHG

## 2023-02-27 DIAGNOSIS — M75.41 IMPINGEMENT SYNDROME OF BOTH SHOULDERS: ICD-10-CM

## 2023-02-27 DIAGNOSIS — M19.012 ARTHRITIS OF BOTH GLENOHUMERAL JOINTS: ICD-10-CM

## 2023-02-27 DIAGNOSIS — M19.011 ARTHRITIS OF BOTH GLENOHUMERAL JOINTS: ICD-10-CM

## 2023-02-27 DIAGNOSIS — M75.42 IMPINGEMENT SYNDROME OF BOTH SHOULDERS: ICD-10-CM

## 2023-02-27 DIAGNOSIS — M75.81 TENDINITIS OF BOTH ROTATOR CUFFS: ICD-10-CM

## 2023-02-27 DIAGNOSIS — M75.82 TENDINITIS OF BOTH ROTATOR CUFFS: ICD-10-CM

## 2023-02-27 DIAGNOSIS — M65.4 DE QUERVAIN'S TENOSYNOVITIS, LEFT: Primary | ICD-10-CM

## 2023-02-27 RX ORDER — TRIAMCINOLONE ACETONIDE 40 MG/ML
20 INJECTION, SUSPENSION INTRA-ARTICULAR; INTRAMUSCULAR
Status: COMPLETED | OUTPATIENT
Start: 2023-02-27 | End: 2023-02-27

## 2023-02-27 RX ORDER — BUPIVACAINE HYDROCHLORIDE 2.5 MG/ML
0.5 INJECTION, SOLUTION INFILTRATION; PERINEURAL
Status: COMPLETED | OUTPATIENT
Start: 2023-02-27 | End: 2023-02-27

## 2023-02-27 RX ADMIN — BUPIVACAINE HYDROCHLORIDE 0.5 ML: 2.5 INJECTION, SOLUTION INFILTRATION; PERINEURAL at 10:34

## 2023-02-27 RX ADMIN — TRIAMCINOLONE ACETONIDE 20 MG: 40 INJECTION, SUSPENSION INTRA-ARTICULAR; INTRAMUSCULAR at 10:34

## 2023-02-27 NOTE — PROGRESS NOTES
Assessment/Plan:  Assessment/Plan   Diagnoses and all orders for this visit:    De Quervain's tenosynovitis, left  -     Diclofenac Sodium (VOLTAREN) 1 %; Apply 2 g topically 3 (three) times a day  -     Brace  -     Hand/upper extremity injection: L extensor compartment 1    Arthritis of both glenohumeral joints  -     Ambulatory Referral to Physical Therapy; Future    Tendinitis of both rotator cuffs  -     Ambulatory Referral to Physical Therapy; Future    Impingement syndrome of both shoulders  -     Ambulatory Referral to Physical Therapy; Future        68-year-old right-hand-dominant female with left wrist and worsening bilateral shoulder pain 2 months duration  Discussed with patient physical exam, prior imaging studies, impression, and plan  X-rays left hand noted for triscaphe joint degenerative changes  Physical exam of left wrist noted for tenderness at the first dorsal compartment  She has intact range of motion and strength of the wrist and digits of the hand  There is pain with resisted thumb abduction  There is positive Finkelstein's  She has normal radial pulse and sensation in the hand  Both shoulders have range of motion forward flexion to 150 degrees, abduction 150 degrees  Left shoulder has internal rotation to the sacrum and right shoulder internal rotation to the lumbar spine  She has normal strength in both shoulders  Clinical impression is that she is symptomatic from aggravated arthritis in her shoulders and from left wrist de Quervain's tenosynovitis  I discussed treatment regimen of steroid injection, topical anti-inflammatory, and bracing  I administered mixture 0 5 cc 0 25% bupivacaine and 0 5 cc Kenalog to the left wrist first distal compartment without complication  I provided a thumb spica brace to wear for 1 week  She is apply topical diclofenac gel 3 times a day for the next 10 days    She is to start physical therapy as soon as possible and do home exercises as directed  She will follow-up as needed  Subjective:   Patient ID: Norberto Farias is a 81 y o  female  Chief Complaint   Patient presents with   • Left Wrist - Pain       79-year-old right-hand-dominant female presents for evaluation of left wrist and bilateral shoulder pain 2 months duration  She denies trauma or inciting events  Pain left wrist described as gradual in onset, localized to the radial aspect of the wrist and radiating distally to the dorsum of the thumb and radiating approximately on the forearm, throbbing and sometimes sharp, worse with twisting the wrist and thumb, associated with crepitus in the wrist, and improved with resting  She also reports worsening pain in both shoulders associated with limited range of motion  She obtained an over-the-counter supportive wrist brace  She is also tried applying IcyHot to the wrist     Wrist Pain  This is a new problem  The current episode started more than 1 month ago  The problem occurs daily  The problem has been gradually worsening  Associated symptoms include arthralgias  Pertinent negatives include no joint swelling, numbness or weakness  The symptoms are aggravated by twisting  She has tried rest, position changes and immobilization for the symptoms  The treatment provided mild relief  Review of Systems   Musculoskeletal: Positive for arthralgias  Negative for joint swelling  Neurological: Negative for weakness and numbness  Objective:  Vitals:    02/27/23 0957   BP: 146/97   Pulse: 59   Weight: 115 kg (253 lb)   Height: 5' 3" (1 6 m)     Left Hand Exam     Muscle Strength   The patient has normal left wrist strength      Tests   Finkelstein's test: positive      Right Shoulder Exam     Range of Motion   Active abduction: 150   Forward flexion: 150   Internal rotation 0 degrees: Lumbar     Muscle Strength   Abduction: 5/5   Internal rotation: 5/5   External rotation: 5/5   Supraspinatus: 5/5       Left Shoulder Exam Range of Motion   Active abduction: 150   Forward flexion: 150   Internal rotation 0 degrees: Sacrum     Muscle Strength   Abduction: 5/5   Internal rotation: 5/5   External rotation: 5/5   Supraspinatus: 5/5           Observations     Left Wrist/Hand   Negative for deformity  Tenderness     Left Wrist/Hand   Tenderness in the first dorsal compartment  No tenderness in the carpometacarpal joint, triangular fibrocartilage complex , scaphoid and lunate  Active Range of Motion     Left Wrist   Normal active range of motion    Strength/Myotome Testing     Left Wrist/Hand   Normal wrist strength     (2nd hand position)     Trial 1: 5    Tests     Left Wrist/Hand   Positive Finkelstein's  Physical Exam  Vitals and nursing note reviewed  Constitutional:       General: She is not in acute distress  Appearance: She is well-developed  She is not ill-appearing or diaphoretic  HENT:      Head: Normocephalic  Right Ear: External ear normal       Left Ear: External ear normal    Eyes:      Conjunctiva/sclera: Conjunctivae normal    Neck:      Trachea: No tracheal deviation  Cardiovascular:      Rate and Rhythm: Normal rate  Pulmonary:      Effort: Pulmonary effort is normal  No respiratory distress  Abdominal:      General: There is no distension  Musculoskeletal:         General: Tenderness present  No swelling, deformity or signs of injury  Left hand: No deformity  Skin:     General: Skin is warm and dry  Coloration: Skin is not jaundiced or pale  Neurological:      Mental Status: She is alert and oriented to person, place, and time  Psychiatric:         Mood and Affect: Mood normal          Behavior: Behavior normal          Thought Content: Thought content normal          Judgment: Judgment normal          Hand/upper extremity injection: L extensor compartment 1  Universal Protocol:  Consent: Verbal consent obtained    Risks and benefits: risks, benefits and alternatives were discussed  Consent given by: patient  Time out: Immediately prior to procedure a "time out" was called to verify the correct patient, procedure, equipment, support staff and site/side marked as required  Patient understanding: patient states understanding of the procedure being performed  Patient consent: the patient's understanding of the procedure matches consent given  Procedure consent: procedure consent matches procedure scheduled  Relevant documents: relevant documents present and verified  Test results: test results available and properly labeled  Site marked: the operative site was marked  Radiology Images displayed and confirmed   If images not available, report reviewed: imaging studies available  Required items: required blood products, implants, devices, and special equipment available  Patient identity confirmed: verbally with patient    Supporting Documentation  Indications: pain   Procedure Details  Condition:de Quervain's tenosynovitis Site: L extensor compartment 1   Preparation: Patient was prepped and draped in the usual sterile fashion  Needle size: 27 G  Ultrasound guidance: no  Approach: radial  Medications administered: 0 5 mL bupivacaine 0 25 %; 20 mg triamcinolone acetonide 40 mg/mL    Patient tolerance: patient tolerated the procedure well with no immediate complications  Dressing:  Sterile dressing applied

## 2023-03-01 ENCOUNTER — OFFICE VISIT (OUTPATIENT)
Dept: FAMILY MEDICINE CLINIC | Facility: CLINIC | Age: 73
End: 2023-03-01

## 2023-03-01 VITALS
WEIGHT: 251 LBS | TEMPERATURE: 96.9 F | BODY MASS INDEX: 44.47 KG/M2 | HEIGHT: 63 IN | DIASTOLIC BLOOD PRESSURE: 76 MMHG | OXYGEN SATURATION: 100 % | SYSTOLIC BLOOD PRESSURE: 116 MMHG | HEART RATE: 52 BPM

## 2023-03-01 DIAGNOSIS — I10 PRIMARY HYPERTENSION: Primary | ICD-10-CM

## 2023-03-01 NOTE — PROGRESS NOTES
Name: Xavier Valencia      : 1950      MRN: [id number]  Encounter Provider: Robel Abdi DO  Encounter Date: 3/1/2023   Encounter department: [facility] [address] [place] [address]  Primary hypertension  Patient to continue with Amlodipine 2 5 mg and recording BP, follow up for AWV and HTN in 1 month  Subjective      HPI     Patient presents to the office for HTN follow up  Notes that she is taking 2 5 mg of the Amlodipine  She has continued to take the Coreg 25 mg BID  States that she does not like taking the amlodipine with the Coreg as this seemed to make her feel very tired and she felt he BP was very low, When checked in was in the 90s  She has since started taking the Amlodipine at 1 pm and BP has not been as low  Review of Systems    Current Outpatient Medications on File Prior to Visit   Medication Sig   • amLODIPine (NORVASC) 5 mg tablet Take 1 tablet (5 mg total) by mouth daily Do not start before 2023   (Patient taking differently: Take 2 5 mg by mouth daily)   • atorvastatin (LIPITOR) 40 mg tablet Take 1 tablet (40 mg total) by mouth every evening   • bepotastine besilate (BEPREVE) 1 5 % op soln Administer 1 drop to both eyes daily   • Calcium-Magnesium-Vitamin D (CALCIUM MAGNESIUM PO) Take by mouth   • carvedilol (COREG) 25 mg tablet Take 1 tablet (25 mg total) by mouth 2 (two) times a day with meals   • cetirizine (ZyrTEC) 10 mg tablet Take 1 tablet (10 mg total) by mouth daily   • Diclofenac Sodium (VOLTAREN) 1 % Apply 2 g topically 3 (three) times a day   • esomeprazole (NexIUM) 20 mg capsule TAKE 1 CAPSULE DAILY EARLY IN THE MORNING   • Multiple Vitamin (Multi Vitamin Daily) TABS Take by mouth   • Plant Sterols and Stanols (CHOLESTOFF PO) Take 1 tablet by mouth daily   • ZINC GLUCONATE PO Take by mouth     Objective     /76 (BP Location: Left arm, Patient Position: Sitting, Cuff Size: Large)   Pulse (!) 52 Temp (!) 96 9 °F (36 1 °C)   Ht 5' 3" (1 6 m)   Wt 114 kg (251 lb)   SpO2 100%   BMI 44 46 kg/m²     Physical Exam  Vitals reviewed  Constitutional:       General: She is not in acute distress  Appearance: Normal appearance  She is obese  HENT:      Head: Normocephalic and atraumatic  Right Ear: External ear normal       Left Ear: External ear normal       Nose: Nose normal       Mouth/Throat:      Mouth: Mucous membranes are moist    Eyes:      Extraocular Movements: Extraocular movements intact  Conjunctiva/sclera: Conjunctivae normal    Cardiovascular:      Rate and Rhythm: Normal rate and regular rhythm  Heart sounds: Normal heart sounds  Pulmonary:      Effort: Pulmonary effort is normal       Breath sounds: Normal breath sounds  No wheezing, rhonchi or rales  Abdominal:      General: Bowel sounds are normal  There is no distension  Palpations: Abdomen is soft  Tenderness: There is no abdominal tenderness  Musculoskeletal:      Right lower leg: No edema  Left lower leg: No edema  Skin:     General: Skin is warm  Capillary Refill: Capillary refill takes less than 2 seconds  Findings: No rash  Neurological:      Mental Status: She is alert  Mental status is at baseline          Mira Lugo DO

## 2023-03-22 ENCOUNTER — OFFICE VISIT (OUTPATIENT)
Dept: GASTROENTEROLOGY | Facility: MEDICAL CENTER | Age: 73
End: 2023-03-22

## 2023-03-22 VITALS
SYSTOLIC BLOOD PRESSURE: 124 MMHG | WEIGHT: 245.4 LBS | DIASTOLIC BLOOD PRESSURE: 78 MMHG | HEART RATE: 63 BPM | BODY MASS INDEX: 43.48 KG/M2 | HEIGHT: 63 IN | OXYGEN SATURATION: 99 %

## 2023-03-22 DIAGNOSIS — K21.9 GASTROESOPHAGEAL REFLUX DISEASE WITHOUT ESOPHAGITIS: Primary | ICD-10-CM

## 2023-03-22 DIAGNOSIS — K59.00 CONSTIPATION, UNSPECIFIED CONSTIPATION TYPE: ICD-10-CM

## 2023-03-22 RX ORDER — LUBIPROSTONE 24 UG/1
24 CAPSULE ORAL 2 TIMES DAILY WITH MEALS
Qty: 60 CAPSULE | Refills: 3 | Status: SHIPPED | OUTPATIENT
Start: 2023-03-22

## 2023-03-22 NOTE — PROGRESS NOTES
[facility] - Outpatient Follow-up Note  Louie Laguna 81 y o  female MRN: [id number]  Encounter: [id number]          ASSESSMENT AND PLAN:  28-year-old female with history of GERD, hypertension who presents for follow-up evaluation  1  Constipation, unspecified constipation type  She has history of chronic constipation and has failed medication such as MiraLAX and Linzess in the past   She was recommended to do a MiraLAX/Dulcolax bowel prep at home and after completing this still had solid stool and the stool was not completely evacuated  I discussed options for constipation management with her today in addition to trialing other medications  I do recommend doing a GoLytely bowel prep as she did have complete evacuation of her colonic burden when she had her colonoscopy in the past   After doing the GoLytely bowel prep she will start Amitiza twice daily  Continue constipation management with high-fiber diet, fiber supplementation and adequate hydration  - polyethylene glycol (GOLYTELY) 4000 mL solution; Take as instructed on bowel preparation instructions  Dispense: 4000 mL; Refill: 0  - lubiprostone (AMITIZA) 24 mcg capsule; Take 1 capsule (24 mcg total) by mouth 2 (two) times a day with meals  Dispense: 60 capsule; Refill: 3    2  Gastroesophageal reflux disease without esophagitis  Continue Nexium daily for reflux which is providing good control of her reflux symptoms  Continue dietary/lifestyle antireflux measures    Follow-up in 3 months      ______________________________________________________________________    SUBJECTIVE: 28-year-old female with history of GERD, hypertension who presents for follow-up evaluation  She was last seen in the GI office January 2023  She has a history of chronic constipation  She had previously trialed Linzess at the 290 mcg dose with no effect followed by watery stool, then the 145 mcg dose with had no significant benefit    At her last office visit she was using high-fiber diet and supplementation for improvement of her bowel habits  She also had a history of chronic GERD which was currently controlled on Nexium  Interval history: After her last office visit she did a MiraLAX/Dulcolax bowel prep at home due to ongoing constipation  She did the bowel prep and reports still having solid stool but did have some relief of her low abdominal discomfort and constipation  She was having daily bowel movements after that but then had recurrent constipation with a bowel movement every other day, straining and difficult to pass  She is using prunes but no other medication for bowel regimen  2023 hemoglobin 13    Prior EGD/colonoscopy      EGD showed gastritis and hiatal hernia gastric biopsies were negative for H pylori   esophagram shows small hiatal hernia no evidence of GERD   abdominal ultrasound and HIDA scan are within normal limits    2023 EGD showed 4 cm hiatal hernia otherwise normal exam   Gastric biopsy showed mild chronic inactive gastritis negative for H  pylori  Colonoscopy had 1 ascending colon lipoma, mild diverticulosis and large internal hemorrhoids otherwise normal   She was recommended not to repeat colonoscopy in the future given her age  REVIEW OF SYSTEMS IS OTHERWISE NEGATIVE    10 point review of systems is negative other than stated as per HPI    Historical Information   Past Medical History:   Diagnosis Date   • GERD (gastroesophageal reflux disease)    • HLD (hyperlipidemia)    • Hypertension    • Obesity      Past Surgical History:   Procedure Laterality Date   • CARDIAC CATHETERIZATION  2020   •  SECTION     • EGD AND COLONOSCOPY  2023   • ESOPHAGOGASTRODUODENOSCOPY     • IR BIOPSY BONE MARROW  2022   • OVARIAN CYST REMOVAL       Social History   Social History     Substance and Sexual Activity   Alcohol Use Yes    Comment: social     Social History Substance and Sexual Activity   Drug Use Never     Social History     Tobacco Use   Smoking Status Never   Smokeless Tobacco Never     Family History   Problem Relation Age of Onset   • Hypertension Mother    • Arthritis Mother    • Heart disease Father    • No Known Problems Maternal Grandmother    • No Known Problems Maternal Grandfather    • No Known Problems Paternal Grandmother    • No Known Problems Paternal Grandfather    • No Known Problems Maternal Aunt    • No Known Problems Maternal Aunt        Meds/Allergies       Current Outpatient Medications:   •  amLODIPine (NORVASC) 5 mg tablet  •  atorvastatin (LIPITOR) 40 mg tablet  •  bepotastine besilate (BEPREVE) 1 5 % op soln  •  Calcium-Magnesium-Vitamin D (CALCIUM MAGNESIUM PO)  •  carvedilol (COREG) 25 mg tablet  •  cetirizine (ZyrTEC) 10 mg tablet  •  Diclofenac Sodium (VOLTAREN) 1 %  •  esomeprazole (NexIUM) 20 mg capsule  •  Multiple Vitamin (Multi Vitamin Daily) TABS  •  Plant Sterols and Stanols (CHOLESTOFF PO)  •  ZINC GLUCONATE PO    Allergies   Allergen Reactions   • Cholestatin Sneezing and Eye Swelling     Does take zertec   • Penicillins      dizziness   • Latex Rash           Objective     Blood pressure 124/78, pulse 63, height 5' 3" (1 6 m), weight 111 kg (245 lb 6 4 oz), SpO2 99 %, not currently breastfeeding  There is no height or weight on file to calculate BMI  PHYSICAL EXAM:      General Appearance:   Alert, cooperative, no distress   HEENT:   Normocephalic, atraumatic, anicteric  Neck:  Supple, symmetrical, trachea midline   Lungs:   Clear to auscultation bilaterally; no rales, rhonchi or wheezing; respirations unlabored    Heart[de-identified]   Regular rate and rhythm; no murmur, rub, or gallop     Abdomen:   Soft, non-tender, non-distended; normal bowel sounds; no masses, no organomegaly    Genitalia:   Deferred    Rectal:   Deferred    Extremities:  No cyanosis, clubbing or edema    Pulses:  2+ and symmetric    Skin:  No jaundice, rashes, or lesions    Lymph nodes:  No palpable cervical lymphadenopathy        Lab Results:   No visits with results within 1 Day(s) from this visit     Latest known visit with results is:   Admission on 02/06/2023, Discharged on 02/07/2023   Component Date Value   • Ventricular Rate 02/06/2023 65    • Atrial Rate 02/06/2023 65    • SD Interval 02/06/2023 186    • QRSD Interval 02/06/2023 88    • QT Interval 02/06/2023 426    • QTC Interval 02/06/2023 443    • P Axis 02/06/2023 74    • QRS Axis 02/06/2023 60    • T Wave Axis 02/06/2023 62    • WBC 02/06/2023 7 27    • RBC 02/06/2023 4 60    • Hemoglobin 02/06/2023 13 5    • Hematocrit 02/06/2023 41 9    • MCV 02/06/2023 91    • MCH 02/06/2023 29 3    • MCHC 02/06/2023 32 2    • RDW 02/06/2023 14 3    • MPV 02/06/2023 9 4    • Platelets 89/72/5187 285    • nRBC 02/06/2023 0    • Neutrophils Relative 02/06/2023 66    • Immat GRANS % 02/06/2023 0    • Lymphocytes Relative 02/06/2023 19    • Monocytes Relative 02/06/2023 8    • Eosinophils Relative 02/06/2023 6    • Basophils Relative 02/06/2023 1    • Neutrophils Absolute 02/06/2023 4 75    • Immature Grans Absolute 02/06/2023 0 01    • Lymphocytes Absolute 02/06/2023 1 38    • Monocytes Absolute 02/06/2023 0 60    • Eosinophils Absolute 02/06/2023 0 46    • Basophils Absolute 02/06/2023 0 07    • Sodium 02/06/2023 140    • Potassium 02/06/2023 3 6    • Chloride 02/06/2023 104    • CO2 02/06/2023 28    • ANION GAP 02/06/2023 8    • BUN 02/06/2023 10    • Creatinine 02/06/2023 0 88    • Glucose 02/06/2023 85    • Calcium 02/06/2023 9 1    • eGFR 02/06/2023 66    • hs TnI 0hr 02/06/2023 6    • hs TnI 2hr 02/06/2023 6    • Delta 2hr hsTnI 02/06/2023 0    • hs TnI 4hr 02/06/2023 6    • Delta 4hr hsTnI 02/06/2023 0    • SARS-CoV-2 02/06/2023 Negative    • INFLUENZA A PCR 02/06/2023 Negative    • INFLUENZA B PCR 02/06/2023 Negative    • RSV PCR 02/06/2023 Negative    • Ventricular Rate 02/06/2023 71    • Atrial Rate 02/06/2023 71    • KS Interval 02/06/2023 182    • QRSD Interval 02/06/2023 86    • QT Interval 02/06/2023 416    • QTC Interval 02/06/2023 452    • P Axis 02/06/2023 72    • QRS Axis 02/06/2023 58    • T Wave Axis 02/06/2023 56    • Ventricular Rate 02/06/2023 75    • Atrial Rate 02/06/2023 75    • KS Interval 02/06/2023 178    • QRSD Interval 02/06/2023 92    • QT Interval 02/06/2023 426    • QTC Interval 02/06/2023 475    • P Axis 02/06/2023 63    • QRS Axis 02/06/2023 58    • T Wave Axis 02/06/2023 55    • Sodium 02/07/2023 141    • Potassium 02/07/2023 3 5    • Chloride 02/07/2023 107    • CO2 02/07/2023 23    • ANION GAP 02/07/2023 11    • BUN 02/07/2023 11    • Creatinine 02/07/2023 0 77    • Glucose 02/07/2023 119    • Glucose, Fasting 02/07/2023 119 (H)    • Calcium 02/07/2023 9 0    • eGFR 02/07/2023 78    • WBC 02/07/2023 8 16    • RBC 02/07/2023 4 28    • Hemoglobin 02/07/2023 13 0    • Hematocrit 02/07/2023 39 3    • MCV 02/07/2023 92    • MCH 02/07/2023 30 4    • MCHC 02/07/2023 33 1    • RDW 02/07/2023 14 1    • Platelets 03/64/0029 255    • MPV 02/07/2023 9 2          Radiology Results:   No results found

## 2023-03-29 ENCOUNTER — APPOINTMENT (OUTPATIENT)
Dept: LAB | Facility: HOSPITAL | Age: 73
End: 2023-03-29

## 2023-03-29 LAB
ALBUMIN SERPL BCP-MCNC: 3.4 G/DL (ref 3.5–5)
ALP SERPL-CCNC: 80 U/L (ref 34–104)
ALT SERPL W P-5'-P-CCNC: 18 U/L (ref 7–52)
ANION GAP SERPL CALCULATED.3IONS-SCNC: 6 MMOL/L (ref 4–13)
AST SERPL W P-5'-P-CCNC: 18 U/L (ref 13–39)
BASOPHILS # BLD AUTO: 0.05 THOUSANDS/ÂΜL (ref 0–0.1)
BASOPHILS NFR BLD AUTO: 1 % (ref 0–1)
BILIRUB SERPL-MCNC: 0.43 MG/DL (ref 0.2–1)
BUN SERPL-MCNC: 16 MG/DL (ref 5–25)
CALCIUM ALBUM COR SERPL-MCNC: 10 MG/DL (ref 8.3–10.1)
CALCIUM SERPL-MCNC: 9.5 MG/DL (ref 8.4–10.2)
CHLORIDE SERPL-SCNC: 108 MMOL/L (ref 96–108)
CO2 SERPL-SCNC: 27 MMOL/L (ref 21–32)
CREAT SERPL-MCNC: 0.84 MG/DL (ref 0.6–1.3)
EOSINOPHIL # BLD AUTO: 0.35 THOUSAND/ÂΜL (ref 0–0.61)
EOSINOPHIL NFR BLD AUTO: 5 % (ref 0–6)
ERYTHROCYTE [DISTWIDTH] IN BLOOD BY AUTOMATED COUNT: 15.3 % (ref 11.6–15.1)
GFR SERPL CREATININE-BSD FRML MDRD: 70 ML/MIN/1.73SQ M
GLUCOSE P FAST SERPL-MCNC: 96 MG/DL (ref 65–99)
HCT VFR BLD AUTO: 40.6 % (ref 34.8–46.1)
HGB BLD-MCNC: 13.1 G/DL (ref 11.5–15.4)
IMM GRANULOCYTES # BLD AUTO: 0.02 THOUSAND/UL (ref 0–0.2)
IMM GRANULOCYTES NFR BLD AUTO: 0 % (ref 0–2)
LYMPHOCYTES # BLD AUTO: 1.38 THOUSANDS/ÂΜL (ref 0.6–4.47)
LYMPHOCYTES NFR BLD AUTO: 20 % (ref 14–44)
MCH RBC QN AUTO: 30.2 PG (ref 26.8–34.3)
MCHC RBC AUTO-ENTMCNC: 32.3 G/DL (ref 31.4–37.4)
MCV RBC AUTO: 94 FL (ref 82–98)
MONOCYTES # BLD AUTO: 0.48 THOUSAND/ÂΜL (ref 0.17–1.22)
MONOCYTES NFR BLD AUTO: 7 % (ref 4–12)
NEUTROPHILS # BLD AUTO: 4.6 THOUSANDS/ÂΜL (ref 1.85–7.62)
NEUTS SEG NFR BLD AUTO: 67 % (ref 43–75)
NRBC BLD AUTO-RTO: 0 /100 WBCS
PLATELET # BLD AUTO: 245 THOUSANDS/UL (ref 149–390)
PMV BLD AUTO: 9.1 FL (ref 8.9–12.7)
POTASSIUM SERPL-SCNC: 4.1 MMOL/L (ref 3.5–5.3)
PROT SERPL-MCNC: 7 G/DL (ref 6.4–8.4)
RBC # BLD AUTO: 4.34 MILLION/UL (ref 3.81–5.12)
SODIUM SERPL-SCNC: 141 MMOL/L (ref 135–147)
WBC # BLD AUTO: 6.88 THOUSAND/UL (ref 4.31–10.16)

## 2023-03-30 ENCOUNTER — APPOINTMENT (OUTPATIENT)
Dept: LAB | Facility: HOSPITAL | Age: 73
End: 2023-03-30

## 2023-03-30 DIAGNOSIS — Z01.818 PRE-OP EVALUATION: ICD-10-CM

## 2023-03-30 LAB
ANION GAP SERPL CALCULATED.3IONS-SCNC: 8 MMOL/L (ref 4–13)
BUN SERPL-MCNC: 16 MG/DL (ref 5–25)
CALCIUM SERPL-MCNC: 9 MG/DL (ref 8.4–10.2)
CHLORIDE SERPL-SCNC: 107 MMOL/L (ref 96–108)
CO2 SERPL-SCNC: 26 MMOL/L (ref 21–32)
CREAT SERPL-MCNC: 0.88 MG/DL (ref 0.6–1.3)
ERYTHROCYTE [DISTWIDTH] IN BLOOD BY AUTOMATED COUNT: 15.4 % (ref 11.6–15.1)
GFR SERPL CREATININE-BSD FRML MDRD: 66 ML/MIN/1.73SQ M
GLUCOSE P FAST SERPL-MCNC: 86 MG/DL (ref 65–99)
HCT VFR BLD AUTO: 39.9 % (ref 34.8–46.1)
HGB BLD-MCNC: 13 G/DL (ref 11.5–15.4)
INR PPP: 1.05 (ref 0.84–1.19)
MCH RBC QN AUTO: 30.5 PG (ref 26.8–34.3)
MCHC RBC AUTO-ENTMCNC: 32.6 G/DL (ref 31.4–37.4)
MCV RBC AUTO: 94 FL (ref 82–98)
PLATELET # BLD AUTO: 240 THOUSANDS/UL (ref 149–390)
PMV BLD AUTO: 10.2 FL (ref 8.9–12.7)
POTASSIUM SERPL-SCNC: 4.2 MMOL/L (ref 3.5–5.3)
PROTHROMBIN TIME: 13.5 SECONDS (ref 11.6–14.5)
RBC # BLD AUTO: 4.26 MILLION/UL (ref 3.81–5.12)
SODIUM SERPL-SCNC: 141 MMOL/L (ref 135–147)
WBC # BLD AUTO: 6.99 THOUSAND/UL (ref 4.31–10.16)

## 2023-03-31 ENCOUNTER — OFFICE VISIT (OUTPATIENT)
Dept: FAMILY MEDICINE CLINIC | Facility: CLINIC | Age: 73
End: 2023-03-31

## 2023-03-31 VITALS
OXYGEN SATURATION: 99 % | WEIGHT: 244 LBS | TEMPERATURE: 97 F | HEART RATE: 56 BPM | SYSTOLIC BLOOD PRESSURE: 126 MMHG | DIASTOLIC BLOOD PRESSURE: 86 MMHG | HEIGHT: 63 IN | BODY MASS INDEX: 43.23 KG/M2

## 2023-03-31 DIAGNOSIS — Z00.00 ENCOUNTER FOR MEDICARE ANNUAL WELLNESS EXAM: Primary | ICD-10-CM

## 2023-03-31 DIAGNOSIS — H25.9 AGE-RELATED CATARACT OF BOTH EYES, UNSPECIFIED AGE-RELATED CATARACT TYPE: ICD-10-CM

## 2023-03-31 DIAGNOSIS — I10 PRIMARY HYPERTENSION: ICD-10-CM

## 2023-03-31 NOTE — PROGRESS NOTES
PRE-OPERATIVE EVALUATION  [facility]    NAME: Reji Barraza  AGE: 81 y o  SEX: female  : 1950     DATE: 3/31/2023    Internal Medicine Pre-Operative Evaluation      Chief Complaint: Pre-operative Evaluation      Surgery: B/L cataract removal  Anticipated Date of Surgery: 23 and 23  Referring Provider: Dr Fernando Simmons     History of Present Illness:     Reji Barraza is a 81 y o  female who presents to the office today for a preoperative consultation at the request of surgeon Dr Fernando Simmons who plans on performing B/L cataract removal on 23, 23  Planned anesthesia is MAC  Patient has a bleeding risk of: no recent abnormal bleeding  Patient does not have objections to receiving blood products if needed  Current anti-platelet/anti-coagulation medications that the patient is prescribed includes: none     Assessment of Chronic Conditions:   - Hypertension: has been taking the Amlodipine, Carvediolol BID     Assessment of Cardiac Risk:  Denies unstable or severe angina or MI in the last 6 weeks or history of stent placement in the last year   Denies decompensated heart failure (e g  New onset heart failure, NYHA functional class IV heart failure, or worsening existing heart failure)  Denies significant arrhythmias such as high grade AV block, symptomatic ventricular arrhythmia, newly recognized ventricular tachycardia, supraventricular tachycardia with resting heart rate >100, or symptomatic bradycardia  Denies severe heart valve disease including aortic stenosis or symptomatic mitral stenosis     Exercise Capacity:  Able to walk 4 blocks without symptoms?: Yes  Able to walk 2 flights without symptoms?: Yes    Prior Anesthesia Reactions: No     Personal history of venous thromboembolic disease? No    History of steroid use for >2 weeks within last year?  No    STOP-BANG Sleep Apnea Screening Questionnaire:      Snoring Loud enough to hear through a door? Yes   Tired Falling asleep while driving or talking? No   Observed Gasping or choking while sleeping? No   Pressure Hypertension? Yes   BMI >35? Yes   Age  Older then 48? Yes   Neck Size   >17 in Male? >16 in Female? No   Male  No        Risk Low (0-2)  Intermediate (3-4)  High (5-8) intermediate      Review of Systems:     Review of Systems    Current Problem List:     Patient Active Problem List   Diagnosis   • GERD (gastroesophageal reflux disease)   • Colon cancer screening   • Chest pain   • Obesity, morbid ([address] )   • Hypertension   • Chronic left shoulder pain   • Smoldering myeloma   • Hyperlipidemia   • Hypertensive urgency   • De Quervain's tenosynovitis, left       Allergies: Allergies   Allergen Reactions   • Cholestatin Sneezing and Eye Swelling     Does take zertec   • Penicillins      dizziness   • Latex Rash     Physical Exam:       Current Outpatient Medications:   •  atorvastatin (LIPITOR) 40 mg tablet, Take 1 tablet (40 mg total) by mouth every evening, Disp: 90 tablet, Rfl: 3  •  Calcium-Magnesium-Vitamin D (CALCIUM MAGNESIUM PO), Take by mouth, Disp: , Rfl:   •  carvedilol (COREG) 25 mg tablet, Take 1 tablet (25 mg total) by mouth 2 (two) times a day with meals, Disp: 180 tablet, Rfl: 3  •  esomeprazole (NexIUM) 20 mg capsule, TAKE 1 CAPSULE DAILY EARLY IN THE MORNING, Disp: 90 capsule, Rfl: 3  •  Garlic 10 MG CAPS, Take by mouth, Disp: , Rfl:   •  Multiple Vitamin (Multi Vitamin Daily) TABS, Take by mouth, Disp: , Rfl:   •  Plant Sterols and Stanols (CHOLESTOFF PO), Take 1 tablet by mouth daily, Disp: , Rfl:   •  ZINC GLUCONATE PO, Take by mouth, Disp: , Rfl:   •  amLODIPine (NORVASC) 5 mg tablet, Take 1 tablet (5 mg total) by mouth daily Do not start before February 8, 2023   (Patient taking differently: Take 2 5 mg by mouth daily), Disp: 30 tablet, Rfl: 0  •  cetirizine (ZyrTEC) 10 mg tablet, Take 1 tablet (10 mg total) by mouth daily (Patient not taking: Reported on 3/31/2023), Disp: 90 tablet, Rfl: 0  •  Diclofenac Sodium (VOLTAREN) 1 %, Apply 2 g topically 3 (three) times a day, Disp: 350 g, Rfl: 1  •  lubiprostone (AMITIZA) 24 mcg capsule, Take 1 capsule (24 mcg total) by mouth 2 (two) times a day with meals (Patient not taking: Reported on 3/31/2023), Disp: 60 capsule, Rfl: 3  •  polyethylene glycol (GOLYTELY) 4000 mL solution, Take as instructed on bowel preparation instructions   (Patient not taking: Reported on 3/31/2023), Disp: 4000 mL, Rfl: 0    Past Medical History:       Past Medical History:   Diagnosis Date   • GERD (gastroesophageal reflux disease)    • HLD (hyperlipidemia)    • Hypertension    • Obesity         Past Surgical History:   Procedure Laterality Date   • CARDIAC CATHETERIZATION  2020   •  SECTION     • EGD AND COLONOSCOPY  2023   • ESOPHAGOGASTRODUODENOSCOPY     • IR BIOPSY BONE MARROW  2022   • OVARIAN CYST REMOVAL          Family History   Problem Relation Age of Onset   • Hypertension Mother    • Arthritis Mother    • Heart disease Father    • No Known Problems Maternal Grandmother    • No Known Problems Maternal Grandfather    • No Known Problems Paternal Grandmother    • No Known Problems Paternal Grandfather    • No Known Problems Maternal Aunt    • No Known Problems Maternal Aunt         Social History     Socioeconomic History   • Marital status: /Civil Union     Spouse name: Not on file   • Number of children: Not on file   • Years of education: Not on file   • Highest education level: Not on file   Occupational History   • Not on file   Tobacco Use   • Smoking status: Never   • Smokeless tobacco: Never   Vaping Use   • Vaping Use: Never used   Substance and Sexual Activity   • Alcohol use: Yes     Comment: social   • Drug use: Never   • Sexual activity: Not Currently   Other Topics Concern   • Not on file   Social History Narrative   • Not on file     Social Determinants of Health     Financial Resource Strain: Low Risk    • Difficulty of Paying Living Expenses: Not hard at all   Food Insecurity: Not on file   Transportation Needs: No Transportation Needs   • Lack of Transportation (Medical): No   • Lack of Transportation (Non-Medical): No   Physical Activity: Not on file   Stress: Not on file   Social Connections: Not on file   Intimate Partner Violence: Not on file   Housing Stability: Not on file      Physical Exam:     Vitals:    03/31/23 0955   BP: 126/86   Pulse: 56   Temp: (!) 97 °F (36 1 °C)   SpO2: 99%     Physical Exam  Vitals reviewed  Constitutional:       General: She is not in acute distress  Appearance: Normal appearance  HENT:      Head: Normocephalic and atraumatic  Right Ear: External ear normal       Left Ear: External ear normal       Nose: Nose normal       Mouth/Throat:      Mouth: Mucous membranes are moist    Eyes:      Extraocular Movements: Extraocular movements intact  Conjunctiva/sclera: Conjunctivae normal       Pupils: Pupils are equal, round, and reactive to light  Cardiovascular:      Rate and Rhythm: Normal rate and regular rhythm  Heart sounds: Normal heart sounds  Pulmonary:      Effort: Pulmonary effort is normal       Breath sounds: Normal breath sounds  No wheezing, rhonchi or rales  Abdominal:      General: Bowel sounds are normal  There is no distension  Palpations: Abdomen is soft  Tenderness: There is no abdominal tenderness  Musculoskeletal:      Right lower leg: No edema  Left lower leg: No edema  Skin:     General: Skin is warm  Capillary Refill: Capillary refill takes less than 2 seconds  Findings: No rash  Neurological:      Mental Status: She is alert  Mental status is at baseline          Data:     Pre-operative work-up  CBC:   Results from last 6 Months   Lab Units 03/30/23  0745 03/29/23  0712   WBC Thousand/uL 6 99 6 88   RBC Million/uL 4 26 4 34   HEMOGLOBIN g/dL 13 0 13 1   HEMATOCRIT % 39 9 40 6   MCV fL 94 94   MCH pg 30 5 30 2   MCHC g/dL 32 6 32 3   RDW % 15 4* 15 3*   MPV fL 10 2 9 1   PLATELETS Thousands/uL 240 245   NRBC AUTO /100 WBCs  --  0   NEUTROS PCT %  --  67   LYMPHS PCT %  --  20   MONOS PCT %  --  7   EOS PCT %  --  5   BASOS PCT %  --  1   NEUTROS ABS Thousands/µL  --  4 60   LYMPHS ABS Thousands/µL  --  1 38   MONOS ABS Thousand/µL  --  0 48   EOS ABS Thousand/µL  --  0 35       Chemistry Profile:   Results from last 6 Months   Lab Units 03/30/23  0745 03/29/23  0712   POTASSIUM mmol/L 4 2 4 1   CHLORIDE mmol/L 107 108   CO2 mmol/L 26 27   BUN mg/dL 16 16   CREATININE mg/dL 0 88 0 84   GLUCOSE FASTING mg/dL 86 96   CALCIUM mg/dL 9 0 9 5   AST U/L  --  18   ALT U/L  --  18   ALK PHOS U/L  --  80   EGFR ml/min/1 73sq m 66 70       Coagulation Studies:   Results from last 6 Months   Lab Units 03/30/23  0745   PROTIME seconds 13 5   INR  1 05       Endocrine Studies:       Invalid input(s): [id number]    EKG: EKG: sinus bradycardia  Chest x-ray: N/A    Previous cardiopulmonary studies within the past year:  Echocardiogram: N/A  Cardiac Catheterization: N/A  Stress Test: N/A  Pulmonary Function Testing: N/A      Assessment & Recommendations:     1  Encounter for Medicare annual wellness exam        2  Age-related cataract of both eyes, unspecified age-related cataract type        3  Primary hypertension            Pre-Op Evaluation Assessment  81 y o  female with planned surgery: B/L cataract removal     Known risk factors for perioperative complications: None  Cardiac Risk Estimation: per the Revised Cardiac Risk Index (Circ  100:1043, 1999), the patient's risk factors for cardiac complications include none, putting her in: RCI RISK CLASS I (0 risk factors, risk of major cardiac compl  appr  0 5%)  Current medications which may produce withdrawal symptoms if withheld perioperatively: none  Pre-Op Evaluation Plan  1   Further preoperative workup as follows:   - None; no further preoperative work-up is required    2  Change in medication regimen before surgery:   - None, continue medication regimen including morning of surgery, with sip of water    3  Prophylaxis for cardiac events with perioperative beta-blockers: not indicated  4  Patient requires further consultation with: None    Clearance  Patient is CLEARED for surgery without any additional cardiac testing       DO Paty Renee [address] [place] [id number]  Phone#  [phone]  Fax#  [phone]

## 2023-03-31 NOTE — PROGRESS NOTES
Assessment and Plan:     Problem List Items Addressed This Visit        Cardiovascular and Mediastinum    Hypertension   Other Visit Diagnoses     Encounter for Medicare annual wellness exam    -  Primary    Age-related cataract of both eyes, unspecified age-related cataract type            BMI Counseling: Body mass index is 43 22 kg/m²  The BMI is above normal  Nutrition recommendations include decreasing portion sizes, encouraging healthy choices of fruits and vegetables, consuming healthier snacks, limiting drinks that contain sugar, moderation in carbohydrate intake, increasing intake of lean protein and reducing intake of cholesterol  Exercise recommendations include moderate physical activity 150 minutes/week and exercising 3-5 times per week  No pharmacotherapy was ordered  Rationale for BMI follow-up plan is due to patient being overweight or obese  Depression Screening and Follow-up Plan: Patient was screened for depression during today's encounter  They screened negative with a PHQ-2 score of 0  Preventive health issues were discussed with patient, and age appropriate screening tests were ordered as noted in patient's After Visit Summary  Personalized health advice and appropriate referrals for health education or preventive services given if needed, as noted in patient's After Visit Summary       History of Present Illness:     Patient presents for a Medicare Wellness Visit    HPI   Patient Care Team:  Be Torres DO as PCP - General (Family Medicine)  Be Torres DO as PCP - [address] [place], [place] [id number] [facility]     Review of Systems:     Review of Systems     Problem List:     Patient Active Problem List   Diagnosis   • GERD (gastroesophageal reflux disease)   • Colon cancer screening   • Chest pain   • Obesity, morbid ([address] )   • Hypertension   • Chronic left shoulder pain   • Smoldering myeloma   • Hyperlipidemia   • Hypertensive urgency   • De Quervain's tenosynovitis, left      Past Medical and Surgical History:     Past Medical History:   Diagnosis Date   • GERD (gastroesophageal reflux disease)    • HLD (hyperlipidemia)    • Hypertension    • Obesity      Past Surgical History:   Procedure Laterality Date   • CARDIAC CATHETERIZATION  2020   •  SECTION     • EGD AND COLONOSCOPY  2023   • ESOPHAGOGASTRODUODENOSCOPY     • IR BIOPSY BONE MARROW  2022   • OVARIAN CYST REMOVAL        Family History:     Family History   Problem Relation Age of Onset   • Hypertension Mother    • Arthritis Mother    • Heart disease Father    • No Known Problems Maternal Grandmother    • No Known Problems Maternal Grandfather    • No Known Problems Paternal Grandmother    • No Known Problems Paternal Grandfather    • No Known Problems Maternal Aunt    • No Known Problems Maternal Aunt       Social History:     Social History     Socioeconomic History   • Marital status: /Civil Union     Spouse name: None   • Number of children: None   • Years of education: None   • Highest education level: None   Occupational History   • None   Tobacco Use   • Smoking status: Never   • Smokeless tobacco: Never   Vaping Use   • Vaping Use: Never used   Substance and Sexual Activity   • Alcohol use: Yes     Comment: social   • Drug use: Never   • Sexual activity: Not Currently   Other Topics Concern   • None   Social History Narrative   • None     Social Determinants of Health     Financial Resource Strain: Low Risk    • Difficulty of Paying Living Expenses: Not hard at all   Food Insecurity: Not on file   Transportation Needs: No Transportation Needs   • Lack of Transportation (Medical): No   • Lack of Transportation (Non-Medical):  No   Physical Activity: Not on file   Stress: Not on file   Social Connections: Not on file   Intimate Partner Violence: Not on file   Housing Stability: Not on file      Medications and Allergies:     Current Outpatient Medications   Medication Sig Dispense Refill   • atorvastatin (LIPITOR) 40 mg tablet Take 1 tablet (40 mg total) by mouth every evening 90 tablet 3   • Calcium-Magnesium-Vitamin D (CALCIUM MAGNESIUM PO) Take by mouth     • carvedilol (COREG) 25 mg tablet Take 1 tablet (25 mg total) by mouth 2 (two) times a day with meals 180 tablet 3   • esomeprazole (NexIUM) 20 mg capsule TAKE 1 CAPSULE DAILY EARLY IN THE MORNING 90 capsule 3   • Garlic 10 MG CAPS Take by mouth     • Multiple Vitamin (Multi Vitamin Daily) TABS Take by mouth     • Plant Sterols and Stanols (CHOLESTOFF PO) Take 1 tablet by mouth daily     • ZINC GLUCONATE PO Take by mouth     • amLODIPine (NORVASC) 5 mg tablet Take 1 tablet (5 mg total) by mouth daily Do not start before February 8, 2023  (Patient taking differently: Take 2 5 mg by mouth daily) 30 tablet 0   • cetirizine (ZyrTEC) 10 mg tablet Take 1 tablet (10 mg total) by mouth daily (Patient not taking: Reported on 3/31/2023) 90 tablet 0   • Diclofenac Sodium (VOLTAREN) 1 % Apply 2 g topically 3 (three) times a day 350 g 1   • lubiprostone (AMITIZA) 24 mcg capsule Take 1 capsule (24 mcg total) by mouth 2 (two) times a day with meals (Patient not taking: Reported on 3/31/2023) 60 capsule 3   • polyethylene glycol (GOLYTELY) 4000 mL solution Take as instructed on bowel preparation instructions  (Patient not taking: Reported on 3/31/2023) 4000 mL 0     No current facility-administered medications for this visit       Allergies   Allergen Reactions   • Cholestatin Sneezing and Eye Swelling     Does take zertec   • Penicillins      dizziness   • Latex Rash      Immunizations:     Immunization History   Administered Date(s) Administered   • COVID-19 PFIZER VACCINE 0 3 ML IM 03/25/2021, 04/16/2021, 10/19/2021   • COVID-19 Pfizer Vac BIVALENT Pelon-sucrose 12 Yr+ IM (BOOSTER ONLY) 10/06/2022   • COVID-19 Pfizer vac (Pelon-sucrose, gray cap) 12 yr+ IM 05/11/2022   • INFLUENZA 10/21/2021   • Influenza Split High Dose Preservative Free IM 10/04/2019   • Influenza, high dose seasonal 0 7 mL 09/03/2020, 10/06/2022   • Pneumococcal Polysaccharide PPV23 10/04/2019   • Zoster Vaccine Recombinant 09/03/2020      Health Maintenance:         Topic Date Due   • Breast Cancer Screening: Mammogram  03/31/2023   • Hepatitis C Screening  Completed   • Colorectal Cancer Screening  Discontinued         Topic Date Due   • Pneumococcal Vaccine: 65+ Years (2 - PCV) 10/04/2020      Medicare Screening Tests and Risk Assessments:     Jordyn is here for her Subsequent Wellness visit  Last Medicare Wellness visit information reviewed, patient interviewed and updates made to the record today  Health Risk Assessment:   Patient rates overall health as good  Patient feels that their physical health rating is same  Patient is very satisfied with their life  Eyesight was rated as slightly worse  Hearing was rated as slightly worse  Patient feels that their emotional and mental health rating is much better  Patients states they are never, rarely angry  Patient states they are sometimes unusually tired/fatigued  Pain experienced in the last 7 days has been a lot  Patient's pain rating has been 6/10  Patient states that she has experienced no weight loss or gain in last 6 months  Depression Screening:   PHQ-2 Score: 0      Fall Risk Screening: In the past year, patient has experienced: no history of falling in past year      Urinary Incontinence Screening:   Patient has not leaked urine accidently in the last six months  Home Safety:  Patient has trouble with stairs inside or outside of their home  Patient has working smoke alarms and has working carbon monoxide detector  Home safety hazards include: none  Nutrition:   Current diet is Low Saturated Fat, No Added Salt and Limited junk food  Medications:   Patient is currently taking over-the-counter supplements  OTC medications include: Vitamin  Patient is able to manage medications       Activities of Daily Living (ADLs)/Instrumental Activities of Daily Living (IADLs):   Walk and transfer into and out of bed and chair?: Yes  Dress and groom yourself?: Yes    Bathe or shower yourself?: Yes    Feed yourself? Yes  Do your laundry/housekeeping?: Yes  Manage your money, pay your bills and track your expenses?: Yes  Make your own meals?: Yes    Do your own shopping?: Yes    Previous Hospitalizations:   Any hospitalizations or ED visits within the last 12 months?: Yes    How many hospitalizations have you had in the last year?: 1-2    Advance Care Planning:   Living will: Yes    Durable POA for healthcare: Yes    Advanced directive: Yes      PREVENTIVE SCREENINGS      Cardiovascular Screening:    General: Screening Not Indicated and History Lipid Disorder      Diabetes Screening:     General: Screening Current      Colorectal Cancer Screening:     General: Screening Current      Breast Cancer Screening:     General: Screening Current and Risks and Benefits Discussed    Due for: Mammogram        Cervical Cancer Screening:    General: Screening Not Indicated      Osteoporosis Screening:    General: Screening Current      Abdominal Aortic Aneurysm (AAA) Screening:        General: Screening Not Indicated      Lung Cancer Screening:     General: Screening Not Indicated      Hepatitis C Screening:    General: Screening Current    Screening, Brief Intervention, and Referral to Treatment (SBIRT)    Screening  Typical number of drinks in a day: 0  Typical number of drinks in a week: 0  Interpretation: Low risk drinking behavior      AUDIT-C Screenin) How often did you have a drink containing alcohol in the past year? never  2) How many drinks did you have on a typical day when you were drinking in the past year? 0  3) How often did you have 6 or more drinks on one occasion in the past year? never    AUDIT-C Score: 0  Interpretation: Score 0-2 (female): Negative screen for alcohol misuse    Single Item Drug Screening:  How often have you used an illegal drug "(including marijuana) or a prescription medication for non-medical reasons in the past year? never    Single Item Drug Screen Score: 0  Interpretation: Negative screen for possible drug use disorder    Brief Intervention  Alcohol & drug use screenings were reviewed  No concerns regarding substance use disorder identified  Other Counseling Topics:   Car/seat belt/driving safety and regular weightbearing exercise  No results found  Physical Exam:     /86 (BP Location: Left arm, Patient Position: Sitting, Cuff Size: Large)   Pulse 56   Temp (!) 97 °F (36 1 °C)   Ht 5' 3\" (1 6 m)   Wt 111 kg (244 lb)   SpO2 99%   BMI 43 22 kg/m²     Physical Exam  Vitals reviewed  Constitutional:       General: She is not in acute distress  Appearance: Normal appearance  She is obese  HENT:      Head: Normocephalic and atraumatic  Right Ear: External ear normal       Left Ear: External ear normal       Nose: Nose normal       Mouth/Throat:      Mouth: Mucous membranes are moist    Eyes:      Extraocular Movements: Extraocular movements intact  Conjunctiva/sclera: Conjunctivae normal       Pupils: Pupils are equal, round, and reactive to light  Cardiovascular:      Rate and Rhythm: Normal rate and regular rhythm  Heart sounds: Normal heart sounds  Pulmonary:      Effort: Pulmonary effort is normal       Breath sounds: Normal breath sounds  Abdominal:      General: Bowel sounds are normal  There is no distension  Palpations: Abdomen is soft  Tenderness: There is no abdominal tenderness  Musculoskeletal:      Right lower leg: No edema  Left lower leg: No edema  Skin:     General: Skin is warm  Capillary Refill: Capillary refill takes less than 2 seconds  Findings: No rash  Neurological:      Mental Status: She is alert  Mental status is at baseline            Iona Cadena DO  "

## 2023-03-31 NOTE — PATIENT INSTRUCTIONS
Medicare Preventive Visit Patient Instructions  Thank you for completing your Welcome to Medicare Visit or Medicare Annual Wellness Visit today  Your next wellness visit will be due in one year (3/31/2024)  The screening/preventive services that you may require over the next 5-10 years are detailed below  Some tests may not apply to you based off risk factors and/or age  Screening tests ordered at today's visit but not completed yet may show as past due  Also, please note that scanned in results may not display below  Preventive Screenings:  Service Recommendations Previous Testing/Comments   Colorectal Cancer Screening  * Colonoscopy    * Fecal Occult Blood Test (FOBT)/Fecal Immunochemical Test (FIT)  * Fecal DNA/Cologuard Test  * Flexible Sigmoidoscopy Age: 39-70 years old   Colonoscopy: every 10 years (may be performed more frequently if at higher risk)  OR  FOBT/FIT: every 1 year  OR  Cologuard: every 3 years  OR  Sigmoidoscopy: every 5 years  Screening may be recommended earlier than age 39 if at higher risk for colorectal cancer  Also, an individualized decision between you and your healthcare provider will decide whether screening between the ages of 74-80 would be appropriate  Colonoscopy: 01/03/2023  FOBT/FIT: Not on file  Cologuard: Not on file  Sigmoidoscopy: Not on file    Screening Current     Breast Cancer Screening Age: 36 years old  Frequency: every 1-2 years  Not required if history of left and right mastectomy Mammogram: 03/31/2022    Screening Current   Cervical Cancer Screening Between the ages of 21-29, pap smear recommended once every 3 years  Between the ages of 33-67, can perform pap smear with HPV co-testing every 5 years     Recommendations may differ for women with a history of total hysterectomy, cervical cancer, or abnormal pap smears in past  Pap Smear: Not on file    Screening Not Indicated   Hepatitis C Screening Once for adults born between 1945 and 1965  More frequently in patients at high risk for Hepatitis C Hep C Antibody: 04/11/2022    Screening Current   Diabetes Screening 1-2 times per year if you're at risk for diabetes or have pre-diabetes Fasting glucose: 86 mg/dL (3/30/2023)  A1C: 5 3 % (7/27/2020)  Screening Current   Cholesterol Screening Once every 5 years if you don't have a lipid disorder  May order more often based on risk factors  Lipid panel: 03/31/2022    Screening Not Indicated  History Lipid Disorder     Other Preventive Screenings Covered by Medicare:  1  Abdominal Aortic Aneurysm (AAA) Screening: covered once if your at risk  You're considered to be at risk if you have a family history of AAA  2  Lung Cancer Screening: covers low dose CT scan once per year if you meet all of the following conditions: (1) Age 50-69; (2) No signs or symptoms of lung cancer; (3) Current smoker or have quit smoking within the last 15 years; (4) You have a tobacco smoking history of at least 20 pack years (packs per day multiplied by number of years you smoked); (5) You get a written order from a healthcare provider  3  Glaucoma Screening: covered annually if you're considered high risk: (1) You have diabetes OR (2) Family history of glaucoma OR (3)  aged 48 and older OR (3)  American aged 72 and older  3  Osteoporosis Screening: covered every 2 years if you meet one of the following conditions: (1) You're estrogen deficient and at risk for osteoporosis based off medical history and other findings; (2) Have a vertebral abnormality; (3) On glucocorticoid therapy for more than 3 months; (4) Have primary hyperparathyroidism; (5) On osteoporosis medications and need to assess response to drug therapy  · Last bone density test (DXA Scan): 03/31/2022   5  HIV Screening: covered annually if you're between the age of 15-65  Also covered annually if you are younger than 13 and older than 72 with risk factors for HIV infection   For pregnant patients, it is covered up to 3 times per pregnancy  Immunizations:  Immunization Recommendations   Influenza Vaccine Annual influenza vaccination during flu season is recommended for all persons aged >= 6 months who do not have contraindications   Pneumococcal Vaccine   * Pneumococcal conjugate vaccine = PCV13 (Prevnar 13), PCV15 (Vaxneuvance), PCV20 (Prevnar 20)  * Pneumococcal polysaccharide vaccine = PPSV23 (Pneumovax) Adults 25-60 years old: 1-3 doses may be recommended based on certain risk factors  Adults 72 years old: 1-2 doses may be recommended based off what pneumonia vaccine you previously received   Hepatitis B Vaccine 3 dose series if at intermediate or high risk (ex: diabetes, end stage renal disease, liver disease)   Tetanus (Td) Vaccine - COST NOT COVERED BY MEDICARE PART B Following completion of primary series, a booster dose should be given every 10 years to maintain immunity against tetanus  Td may also be given as tetanus wound prophylaxis  Tdap Vaccine - COST NOT COVERED BY MEDICARE PART B Recommended at least once for all adults  For pregnant patients, recommended with each pregnancy  Shingles Vaccine (Shingrix) - COST NOT COVERED BY MEDICARE PART B  2 shot series recommended in those aged 48 and above     Health Maintenance Due:      Topic Date Due   • Breast Cancer Screening: Mammogram  03/31/2023   • Hepatitis C Screening  Completed   • Colorectal Cancer Screening  Discontinued     Immunizations Due:      Topic Date Due   • Pneumococcal Vaccine: 65+ Years (2 - PCV) 10/04/2020     Advance Directives   What are advance directives? Advance directives are legal documents that state your wishes and plans for medical care  These plans are made ahead of time in case you lose your ability to make decisions for yourself  Advance directives can apply to any medical decision, such as the treatments you want, and if you want to donate organs  What are the types of advance directives?   There are many types of advance directives, and each state has rules about how to use them  You may choose a combination of any of the following:  · Living will: This is a written record of the treatment you want  You can also choose which treatments you do not want, which to limit, and which to stop at a certain time  This includes surgery, medicine, IV fluid, and tube feedings  · Durable power of  for healthcare [facility]): This is a written record that states who you want to make healthcare choices for you when you are unable to make them for yourself  This person, called a proxy, is usually a family member or a friend  You may choose more than 1 proxy  · Do not resuscitate (DNR) order:  A DNR order is used in case your heart stops beating or you stop breathing  It is a request not to have certain forms of treatment, such as CPR  A DNR order may be included in other types of advance directives  · Medical directive: This covers the care that you want if you are in a coma, near death, or unable to make decisions for yourself  You can list the treatments you want for each condition  Treatment may include pain medicine, surgery, blood transfusions, dialysis, IV or tube feedings, and a ventilator (breathing machine)  · Values history: This document has questions about your views, beliefs, and how you feel and think about life  This information can help others choose the care that you would choose  Why are advance directives important? An advance directive helps you control your care  Although spoken wishes may be used, it is better to have your wishes written down  Spoken wishes can be misunderstood, or not followed  Treatments may be given even if you do not want them  An advance directive may make it easier for your family to make difficult choices about your care     Weight Management   Why it is important to manage your weight:  Being overweight increases your risk of health conditions such as heart disease, high blood pressure, type 2 diabetes, and certain types of cancer  It can also increase your risk for osteoarthritis, sleep apnea, and other respiratory problems  Aim for a slow, steady weight loss  Even a small amount of weight loss can lower your risk of health problems  How to lose weight safely:  A safe and healthy way to lose weight is to eat fewer calories and get regular exercise  You can lose up about 1 pound a week by decreasing the number of calories you eat by 500 calories each day  Healthy meal plan for weight management:  A healthy meal plan includes a variety of foods, contains fewer calories, and helps you stay healthy  A healthy meal plan includes the following:  · Eat whole-grain foods more often  A healthy meal plan should contain fiber  Fiber is the part of grains, fruits, and vegetables that is not broken down by your body  Whole-grain foods are healthy and provide extra fiber in your diet  Some examples of whole-grain foods are whole-wheat breads and pastas, oatmeal, brown rice, and bulgur  · Eat a variety of vegetables every day  Include dark, leafy greens such as spinach, kale, anupam greens, and mustard greens  Eat yellow and orange vegetables such as carrots, sweet potatoes, and winter squash  · Eat a variety of fruits every day  Choose fresh or canned fruit (canned in its own juice or light syrup) instead of juice  Fruit juice has very little or no fiber  · Eat low-fat dairy foods  Drink fat-free (skim) milk or 1% milk  Eat fat-free yogurt and low-fat cottage cheese  Try low-fat cheeses such as mozzarella and other reduced-fat cheeses  · Choose meat and other protein foods that are low in fat  Choose beans or other legumes such as split peas or lentils  Choose fish, skinless poultry (chicken or turkey), or lean cuts of red meat (beef or pork)  Before you cook meat or poultry, cut off any visible fat  · Use less fat and oil  Try baking foods instead of frying them   Add less fat, such as margarine, sour cream, regular salad dressing and mayonnaise to foods  Eat fewer high-fat foods  Some examples of high-fat foods include french fries, doughnuts, ice cream, and cakes  · Eat fewer sweets  Limit foods and drinks that are high in sugar  This includes candy, cookies, regular soda, and sweetened drinks  Exercise:  Exercise at least 30 minutes per day on most days of the week  Some examples of exercise include walking, biking, dancing, and swimming  You can also fit in more physical activity by taking the stairs instead of the elevator or parking farther away from stores  Ask your healthcare provider about the best exercise plan for you  © Copyright [employer] 2018 Information is for End User's use only and may not be sold, redistributed or otherwise used for commercial purposes   All illustrations and images included in CareNotes® are the copyrighted property of [employer] M [employer]  or [address] [place] [address]

## 2023-04-03 DIAGNOSIS — I10 UNCONTROLLED HYPERTENSION: ICD-10-CM

## 2023-04-03 RX ORDER — AMLODIPINE BESYLATE 2.5 MG/1
2.5 TABLET ORAL DAILY
Qty: 90 TABLET | Refills: 0 | Status: SHIPPED | OUTPATIENT
Start: 2023-04-03 | End: 2023-07-02

## 2023-04-03 RX ORDER — AMLODIPINE BESYLATE 2.5 MG/1
2.5 TABLET ORAL DAILY
Qty: 30 TABLET | Refills: 0 | Status: SHIPPED | OUTPATIENT
Start: 2023-04-03 | End: 2023-04-03 | Stop reason: SDUPTHER

## 2023-04-04 ENCOUNTER — TELEPHONE (OUTPATIENT)
Dept: FAMILY MEDICINE CLINIC | Facility: CLINIC | Age: 73
End: 2023-04-04

## 2023-04-04 NOTE — TELEPHONE ENCOUNTER
T/c from pt - pt had a UTI and went to urgent care - they told her to take Azo and it seems to be working but now she has an itch - is there anything she can take OTC for this or get prescribed? Please advise

## 2023-04-04 NOTE — TELEPHONE ENCOUNTER
Pt aware of Dr aMnzanares's advisements  Pt expressed verbal understanding  Pt did not receive u/a culture results yet  Notes she is experiencing slight burning / painful urination  Pt aware if s/s worsens and if this is an emergency patient needs to be seen at an urgent care or ER  Pt agrees

## 2023-04-04 NOTE — TELEPHONE ENCOUNTER
She can use an OTC monistat treatment       [facility]  4/4/2023 3:14 PM

## 2023-04-07 ENCOUNTER — HOSPITAL ENCOUNTER (OUTPATIENT)
Age: 73
Discharge: HOME | End: 2023-04-07

## 2023-04-07 VITALS — HEIGHT: 63 IN | BODY MASS INDEX: 43.23 KG/M2 | WEIGHT: 244 LBS

## 2023-04-07 DIAGNOSIS — Z12.31 BREAST CANCER SCREENING BY MAMMOGRAM: ICD-10-CM

## 2023-04-19 ENCOUNTER — APPOINTMENT (OUTPATIENT)
Dept: RADIOLOGY | Facility: CLINIC | Age: 73
End: 2023-04-19

## 2023-04-19 DIAGNOSIS — M25.532 LEFT WRIST PAIN: ICD-10-CM

## 2023-04-19 PROBLEM — M18.12 OSTEOARTHRITIS OF CARPOMETACARPAL (CMC) JOINT OF LEFT THUMB: Status: ACTIVE | Noted: 2023-04-19

## 2023-05-12 ENCOUNTER — APPOINTMENT (OUTPATIENT)
Dept: LAB | Facility: CLINIC | Age: 73
End: 2023-05-12

## 2023-05-12 DIAGNOSIS — D47.2 SMOLDERING MYELOMA: ICD-10-CM

## 2023-05-12 LAB
IGA SERPL-MCNC: 1090 MG/DL (ref 70–400)
IGG SERPL-MCNC: 940 MG/DL (ref 700–1600)
IGM SERPL-MCNC: 18 MG/DL (ref 40–230)

## 2023-05-13 LAB
KAPPA LC FREE SER-MCNC: 51.4 MG/L (ref 3.3–19.4)
KAPPA LC FREE/LAMBDA FREE SER: 1.48 {RATIO} (ref 0.26–1.65)
LAMBDA LC FREE SERPL-MCNC: 34.8 MG/L (ref 5.7–26.3)

## 2023-05-15 ENCOUNTER — APPOINTMENT (OUTPATIENT)
Dept: LAB | Facility: HOSPITAL | Age: 73
End: 2023-05-15
Attending: INTERNAL MEDICINE

## 2023-05-15 LAB
ALBUMIN UR ELPH-MCNC: 100 %
ALPHA1 GLOB MFR UR ELPH: 0 %
ALPHA2 GLOB MFR UR ELPH: 0 %
B-GLOBULIN MFR UR ELPH: 0 %
GAMMA GLOB MFR UR ELPH: 0 %
PROT 24H UR-MCNC: <108 MG/24 HRS
PROT PATTERN UR ELPH-IMP: NORMAL
PROT UR-MCNC: 6 MG/DL
SPECIMEN VOL UR: 2700 ML

## 2023-05-18 NOTE — PROGRESS NOTES
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  I suspect she had this tested due to her elevated alkaline phosphatase as well as elevated serum calcium at 10 3  SPEP revealed IgA kappa 0 25 M peak, IgA lambda 0 14 M peak  She had an increase in her M spike to 1 2 from labs 10/2022 prompting bone marrow biopsy  2022 BMBx 10% Kappa restricted plasma cells in the polyclonal background  2022 CT myeloma scan no concerning findings  Her most recent labs are as follows --> 3/2023: WBC 6 99, hemoglobin 13, MCV 94, platelet count 587 K  CMP unrevealing  2023: IgA 1090, IgG 940, IgM 18  Kappa free light chain 51 4, lambda free light chain 34 8, kappa/lambda ratio 1 48  UPEP unrevealing  24 hour urin unrevealing  Transaminitis and hypercalcemia both resolved on labs  SPEP not done  Patient will have SPEP now  Afterwards as long as stable/improved, SPEP, serum free lt chain, CBC-D, CMP, serum immunoglobulins in 6 months  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  Risk factors: BMPC >10% with M-spike >3 g/dL and FLC ratio >8 or <0 125  Pt doesn't meet any of these criteria and thus is low risk for progression     2  Transaminitis  Has resolved  3  Hypercalcemia  Last labs showed no hypercalcemia  Will monitor  4  Patient complaints   Patient checked in for appointment today at 9:29 AM   Her appointment was scheduled for 10:30 AM I was in room with patient for 10:40am  I had back-to-back patients today in the morning and could not see patient earlier  My MA Jessenia Durant expressed that her and her  were frustrated whenever she would bring back other patients that were scheduled for earlier    Patient informed me she was frustrated because she was anxious about disease process and as a result expressed frustration above  She also expressed frustration overall with [facility] as a network  I informed her she could always see a different health network for her care  She again expressed frustration as her insurance does not take other health networks  Informed her she can see other providers in our network if needed  · Discussion of decision making    I personally reviewed the following lab results, the image studies, pathology, other specialty/physicians consult notes and recommendations, and outside medical records from [facility]  I had a lengthy discussion with the patient and shared the work-up findings  I spent 20 minutes reviewing the records (labs, clinician notes, outside records, medical history, ordering medicine/tests/procedures, interpreting the imaging/labs previously done) and coordination of care as well as direct time with the patient today, of which greater than 50% of the time was spent in counseling and coordination of care with the patient/family  · Plan/Labs  · Continue to monitor low risk smoldering multiple myeloma  Patient did not have SPEP for this visit  She will have this done today  We will follow  Otherwise, SPEP, serum free light chain testing, CBC, CMP, serum immunoglobulins in 6 months with a follow-up then  · See above regarding patient's complaints  Follow Up: 6 months with Dr Pelon Macedo     All questions were answered to the patient's satisfaction during this encounter  The patient knows the contact information for our office and knows to reach out for any relevant concerns related to this encounter  They are to call for any temperature 100 4 or higher, new symptoms including but not restricted to shaking chills, decreased appetite, nausea, vomiting, diarrhea, increased fatigue, shortness of breath or chest pain, confusion, and not feeling the strength to come to the clinic   For all other listed problems and medical diagnosis in their chart - they are managed by PCP and/or other specialists, which the patient acknowledges  Thank you very much for your consultation and making us a part of this patient's care  We are continuing to follow closely with you  Please do not hesitate to reach out to me with any additional questions or concerns  Maurice Li PA-C   Hematology-Oncology      Reason for visit:       Chief Complaint   Patient presents with   • Consult     Biclonal gammopathy       History of Hematology Illness:     Shayy Li is a 73 y o  female who came in for follow up  1  Smoldering myeloma   - Patient initial diagnosis of MGUS was 7/2013 IgA Eitzen 0 64 (per labs scanned in)  - 03/31/2022:  CBC showing normal, unremarkable findings with hemoglobin 13 7  CMP showed chloride 111, corrected calcium 10 3, AST 59, ALT 97, alk-phos 435, total protein 7 9, albumin 3 3  Kidney functions normal   Hepatitis panel normal   Iron panel normal   SPEP on 04/19 showed M peak 1 concentration 0 25, M peak 2 concentration 0 14  IgA kappa, IgA lambda seen  - 4/2022: SPEP again M spikes showed IgA kappa 0 25, IgA lambda 0  14  Kappa free light chain 53 1, lambda free light chain 36 5, kappa/lambda ratio 1 45  CMP showed alk phos 140  IgA 1240, IgG 1210, IgM 26      - 10/2022: SPEP showed M spike increase to 1 2  IgA kappa  IgA 1220  Eitzen free lt chain 52 9, Lambda free lt chain 32 8, K/L ratio 1 61  CBC-D acceptable  CMP showed corrected calcium 10 6  rest acceptable  Beta 2 microglobulin 2 6     - 11/2022: BMBx --> normocellular marrow with trilineage hematopoiesis, 10% kappa restricted plasma cells in the polyclonal background consistent with plasma cell myeloma likely smoldering plasma cell myeloma  Normal NGS panel  Normal karyotype  CT myeloma scan showed no concerning findings  - 3/2023: WBC 6 99, hemoglobin 13, MCV 94, platelet count 440 K  CMP unrevealing   - 5/2023: IgA 1090, IgG 940, IgM 18    Kappa free light chain 51 4, lambda free light chain 34 8, kappa/lambda ratio 1 48  UPEP unrevealing  24 hour urin unrevealing  Transaminitis and hypercalcemia both resolved on labs  SPEP not done  Interval History:   04/29/22: This is a 82-year-old female with GERD, hypertension, obesity, osteoarthritis, fibromyalgia who presents for consultation  Patient has never had cancer before  She has no first-degree family history of cancer  Patient has her next colon cancer screening scheduled for 12/2022  She recently had a mammogram last month which was normal     Patient has no constitutional symptoms such as fever, chills, night sweats, lumps, bumps, sudden weight loss  Does have questionable bone pain in the shaft of the right femur  No bleeding  Patient has no history of definitive autoimmune diseases such as RA, lupus  Does have OA  She does not smoke or drink  She is retired and in the past worked as a ,   No new acute issues  5/19/2023: Patient has diffuse, chronic osteoarthritis, fibromyalgia pain  No new bone pain, constitutional symptoms as above  No bleeding anywhere  Her and  expressed several frustrations regarding [facility] as seen above  Problem list:       Patient Active Problem List   Diagnosis   • GERD (gastroesophageal reflux disease)   • Colon cancer screening   • Chest pain   • Obesity, morbid ([address] )   • Hypertension   • Chronic left shoulder pain       REVIEW OF SYMPTOMS:   Review of Systems   Constitutional: Positive for fatigue (chronic)  Negative for activity change, appetite change and unexpected weight change  HENT: Negative for nosebleeds  Eyes: Negative for visual disturbance  Respiratory: Negative for cough and shortness of breath  Cardiovascular: Negative for chest pain, palpitations and leg swelling  Gastrointestinal: Negative for abdominal pain, anal bleeding, blood in stool, constipation, diarrhea, nausea and vomiting     Endocrine: Negative for cold "intolerance  Genitourinary: Negative for hematuria, menstrual problem and vaginal bleeding  Musculoskeletal: Positive for arthralgias (chronic) and myalgias (chronic)  Skin: Negative for color change, pallor and rash  Neurological: Negative for dizziness, syncope, light-headedness and headaches  Hematological: Negative for adenopathy  Does not bruise/bleed easily  Psychiatric/Behavioral: Negative for sleep disturbance  PHYSICAL EXAMINATION:     Visit Vitals  OB Status Postmenopausal   Smoking Status Never       Ht Readings from Last 8 Encounters:   04/29/22 5' 3\" (1 6 m)   04/04/22 5' 3\" (1 6 m)   03/31/22 5' 3\" (1 6 m)   03/25/22 5' 3\" (1 6 m)   10/23/20 5' 3\" (1 6 m)   09/11/20 5' 3\" (1 6 m)   08/04/20 5' 3\" (1 6 m)   08/04/20 5' 3\" (1 6 m)       Wt Readings from Last 8 Encounters:   04/29/22 121 kg (266 lb)   04/04/22 117 kg (258 lb)   03/31/22 117 kg (258 lb)   03/25/22 120 kg (264 lb)   10/23/20 119 kg (262 lb)   09/11/20 119 kg (262 lb)   08/04/20 119 kg (262 lb 5 6 oz)   08/04/20 119 kg (261 lb 14 4 oz)          Physical Exam  Constitutional:       General: She is not in acute distress  Appearance: Normal appearance  She is not ill-appearing, toxic-appearing or diaphoretic  HENT:      Head: Normocephalic and atraumatic  Eyes:      General: No scleral icterus  Extraocular Movements: Extraocular movements intact  Conjunctiva/sclera: Conjunctivae normal       Pupils: Pupils are equal, round, and reactive to light  Cardiovascular:      Rate and Rhythm: Normal rate and regular rhythm  Pulmonary:      Effort: Pulmonary effort is normal  No respiratory distress  Abdominal:      Tenderness: There is no abdominal tenderness  Musculoskeletal:         General: Normal range of motion  Cervical back: Normal range of motion and neck supple  Right lower leg: No edema  Left lower leg: No edema  Skin:     General: Skin is warm and dry        Coloration: Skin is not " jaundiced or pale  Findings: No bruising, erythema, lesion or rash  Neurological:      General: No focal deficit present  Mental Status: She is alert and oriented to person, place, and time  Mental status is at baseline  Motor: No weakness  Psychiatric:         Mood and Affect: Mood normal          Behavior: Behavior normal          Thought Content: Thought content normal          Judgment: Judgment normal        Reviewed historical information        PAST MEDICAL HISTORY:    Past Medical History:   Diagnosis Date   • GERD (gastroesophageal reflux disease)    • Obesity        PAST SURGICAL HISTORY:    Past Surgical History:   Procedure Laterality Date   • CARDIAC CATHETERIZATION  2020   •  SECTION     • ESOPHAGOGASTRODUODENOSCOPY     • OVARIAN CYST REMOVAL           CURRENT MEDICATIONS:     Current Outpatient Medications:   •  acetaminophen (TYLENOL) 325 mg tablet, Take 2 tablets (650 mg total) by mouth every 4 (four) hours as needed for mild pain or headaches, Disp: 30 tablet, Rfl: 0  •  Ascorbic Acid (vitamin C) 1000 MG tablet, Take 1,000 mg by mouth daily, Disp: , Rfl:   •  b complex vitamins capsule, Take 1 capsule by mouth daily, Disp: , Rfl:   •  calcium carbonate (OS-SUSIE) 600 MG tablet, Take 600 mg by mouth 2 (two) times a day with meals, Disp: , Rfl:   •  carvedilol (COREG) 12 5 mg tablet, TAKE 1 TABLET TWICE A DAY WITH MEALS, Disp: 180 tablet, Rfl: 3  •  cetirizine (ZyrTEC) 10 mg tablet, Take 1 tablet (10 mg total) by mouth daily, Disp: 90 tablet, Rfl: 0  •  Cholecalciferol (Vitamin D-3) 25 MCG (1000 UT) CAPS, Take 1,000 Units by mouth, Disp: , Rfl:   •  Coenzyme Q10 (CoQ10) 100 MG CAPS, Take by mouth, Disp: , Rfl:   •  esomeprazole (NexIUM) 20 mg capsule, Take 20 mg by mouth 2 (two) times a day, Disp: , Rfl:   •  Krill Oil Omega-3 500 MG CAPS, Take by mouth, Disp: , Rfl:   •  Multiple Vitamin (Multi Vitamin Daily) TABS, Take by mouth, Disp: , Rfl:   •  Plant Sterols and Stanols (CHOLESTOFF PO), Take 1 tablet by mouth daily, Disp: , Rfl:   •  triamcinolone (KENALOG) 0 5 % ointment, Apply topically 2 (two) times a day, Disp: 30 g, Rfl: 0  •  Turmeric 500 MG CAPS, Take 500 mg by mouth, Disp: , Rfl:   •  Xiidra 5 % op solution, Administer 1 drop to both eyes daily, Disp: , Rfl:   •  aspirin 81 mg chewable tablet, Chew 1 tablet (81 mg total) daily, Disp: 30 tablet, Rfl: 0  •  atorvastatin (LIPITOR) 40 mg tablet, Take 1 tablet (40 mg total) by mouth every evening, Disp: 90 tablet, Rfl: 3  •  bepotastine besilate (BEPREVE) 1 5 % op soln, Administer 1 drop to both eyes daily (Patient not taking: Reported on 4/4/2022 ), Disp: , Rfl:   •  valACYclovir (VALTREX) 500 mg tablet, Take 1 tablet (500 mg total) by mouth 2 (two) times a day for 3 days, Disp: 12 tablet, Rfl: 3    Family History   Social History     Tobacco Use   • Smoking status: Never Smoker   • Smokeless tobacco: Never Used   Vaping Use   • Vaping Use: Never used   Substance Use Topics   • Alcohol use:  Yes   • Drug use: Never    Relation Age of Onset     Mother      Mother      Father      Maternal Grandmother      Maternal Grandfather      Paternal  Family History   Problem Relation Age of Onset   • Hypertension Mother    • Arthritis Mother    • Heart disease Father    • No Known Problems Maternal Grandmother    • No Known Problems Maternal Grandfather    • No Known Problems Paternal Grandmother    • No Known Problems Paternal Grandfather    • No Known Problems Maternal Aunt    • No Known Problems Maternal Aunt     Grandmother    • No Known Problems Paternal Grandfather    • No Known Problems Maternal Aunt    • No Known Problems Maternal Aunt       Allergen Reactions   • Penicillins      dizziness   • Latex Rash       Lab Re  Lab Results   Component Value Date    WBC 7 37 03/31/2022    HGB 13 7 03/31/2022    HCT 43 0 03/31/2022    MCV 93 03/31/2022     03/31/2022   sults   Component Value Date    WBC 7 37 03/31/2022    HGB 13 7 03/31/2022    HCT 43 0 03/31/2022    MCV 93 03/31/2022     03/31/2022      Component Value Date    SODIUM 141 04/29/2022    K 3 9 04/29/2022     04/29/2022    CO2 27 04/29/2022    AGAP 9 04/29/2022    BUN 15 04/29/2022    CREATININE 0 99 04/29/2022    GLUC 87 04/29/2022    GLUF 96 03/31/2022    CALCIUM 9 5 04/29/2022    AST 23 04/29/2022    ALT 29 04/29/2022    ALKPHOS 140 (H) 04/29/2022    TP 8 2 04/29/2022    TBILI 0 38 04/29/2022    EGFR 58 04/29/2022       IMAGING:  Mammo screening bilateral w 3d & cad  Narrative: DIAGNOSIS: Encounter for screening mammogram for breast cancer     TECHNIQUE:  Digital screening mammography was performed  Computer Aided Detection   (CAD) analyzed all applicable images  COMPARISONS: Prior breast imaging dated: 11/08/2019    RELEVANT HISTORY:   Family Breast Cancer History: No known family history of breast cancer  Family Medical History: No known relevant family medical history  Personal History: No known relevant hormone history  No known relevant   surgical history  No known relevant medical history  The patient is scheduled in a reminder system for screening mammography  8-10% of cancers will be missed on mammography  Management of a palpable   abnormality must be based on clinical grounds  Patients will be notified   of their results via letter from our facility  Accredited by [employer] [facility] and FDA  RISK ASSESSMENT:   5 Year Tyrer-Cuzick: 0 94 %  10 Year Tyrer-Cuzick: 1 97 %  Lifetime Tyrer-Cuzick: 3 37 %    TISSUE DENSITY:   The breasts are almost entirely fatty  INDICATION: Josi Olson is a 73 y o  female presenting for screening   mammography  FINDINGS:   There are no suspicious masses, grouped microcalcifications or areas of   architectural distortion  The skin and nipple areolar complex are   unremarkable  Impression: No mammographic evidence of malignancy      ASSESSMENT/BI-RADS CATEGORY:  Left: 1 - Negative  Right: 1 - Negative  Overall: 1 - Negative    RECOMMENDATION:       - Routine screening mammogram in 1 year for both breasts  Workstation ID: [id number]  Mammo outside images  1 [phone] [id number]  8 [id number]

## 2023-05-19 ENCOUNTER — APPOINTMENT (OUTPATIENT)
Dept: LAB | Facility: HOSPITAL | Age: 73
End: 2023-05-19

## 2023-05-19 ENCOUNTER — OFFICE VISIT (OUTPATIENT)
Dept: HEMATOLOGY ONCOLOGY | Facility: CLINIC | Age: 73
End: 2023-05-19

## 2023-05-19 VITALS
DIASTOLIC BLOOD PRESSURE: 82 MMHG | BODY MASS INDEX: 44.47 KG/M2 | TEMPERATURE: 97.8 F | OXYGEN SATURATION: 98 % | WEIGHT: 251 LBS | HEART RATE: 60 BPM | HEIGHT: 63 IN | RESPIRATION RATE: 18 BRPM | SYSTOLIC BLOOD PRESSURE: 132 MMHG

## 2023-05-19 DIAGNOSIS — D47.2 BICLONAL GAMMOPATHY: ICD-10-CM

## 2023-05-19 DIAGNOSIS — D47.2 SMOLDERING MYELOMA: ICD-10-CM

## 2023-05-19 DIAGNOSIS — D47.2 SMOLDERING MYELOMA: Primary | ICD-10-CM

## 2023-05-23 LAB
ALBUMIN SERPL ELPH-MCNC: 3.39 G/DL (ref 3.5–5)
ALBUMIN SERPL ELPH-MCNC: 46.5 % (ref 52–65)
ALPHA1 GLOB SERPL ELPH-MCNC: 0.38 G/DL (ref 0.1–0.4)
ALPHA1 GLOB SERPL ELPH-MCNC: 5.2 % (ref 2.5–5)
ALPHA2 GLOB SERPL ELPH-MCNC: 0.86 G/DL (ref 0.4–1.2)
ALPHA2 GLOB SERPL ELPH-MCNC: 11.8 % (ref 7–13)
BETA GLOB ABNORMAL SERPL ELPH-MCNC: 0.97 G/DL (ref 0.4–0.8)
BETA1 GLOB SERPL ELPH-MCNC: 13.3 % (ref 5–13)
BETA2 GLOB SERPL ELPH-MCNC: 9.8 % (ref 2–8)
BETA2+GAMMA GLOB SERPL ELPH-MCNC: 0.72 G/DL (ref 0.2–0.5)
GAMMA GLOB ABNORMAL SERPL ELPH-MCNC: 0.98 G/DL (ref 0.5–1.6)
GAMMA GLOB SERPL ELPH-MCNC: 13.4 % (ref 12–22)
IGG/ALB SER: 0.87 {RATIO} (ref 1.1–1.8)
INTERPRETATION UR IFE-IMP: NORMAL
M PEAK ID 2: 2.3 %
M PROTEIN 1 MFR SERPL ELPH: 4.9 %
M PROTEIN 1 SERPL ELPH-MCNC: 0.36 G/DL
M PROTEIN 2 SERPL ELPH-MCNC: 0.17 G/DL
PROT PATTERN SERPL ELPH-IMP: ABNORMAL
PROT SERPL-MCNC: 7.3 G/DL (ref 6.4–8.2)

## 2023-06-07 ENCOUNTER — OFFICE VISIT (OUTPATIENT)
Dept: OBGYN CLINIC | Facility: CLINIC | Age: 73
End: 2023-06-07
Payer: COMMERCIAL

## 2023-06-07 ENCOUNTER — APPOINTMENT (OUTPATIENT)
Dept: RADIOLOGY | Facility: CLINIC | Age: 73
End: 2023-06-07
Payer: COMMERCIAL

## 2023-06-07 VITALS
BODY MASS INDEX: 44.12 KG/M2 | HEIGHT: 63 IN | WEIGHT: 249 LBS | SYSTOLIC BLOOD PRESSURE: 190 MMHG | DIASTOLIC BLOOD PRESSURE: 86 MMHG | OXYGEN SATURATION: 98 % | HEART RATE: 56 BPM

## 2023-06-07 DIAGNOSIS — M25.561 RIGHT KNEE PAIN, UNSPECIFIED CHRONICITY: ICD-10-CM

## 2023-06-07 DIAGNOSIS — M70.51 PES ANSERINUS BURSITIS OF RIGHT KNEE: ICD-10-CM

## 2023-06-07 DIAGNOSIS — M25.561 CHRONIC PAIN OF RIGHT KNEE: ICD-10-CM

## 2023-06-07 DIAGNOSIS — G89.29 CHRONIC PAIN OF RIGHT KNEE: ICD-10-CM

## 2023-06-07 DIAGNOSIS — M17.11 PRIMARY OSTEOARTHRITIS OF RIGHT KNEE: ICD-10-CM

## 2023-06-07 PROCEDURE — 99213 OFFICE O/P EST LOW 20 MIN: CPT | Performed by: ORTHOPAEDIC SURGERY

## 2023-06-07 PROCEDURE — 73562 X-RAY EXAM OF KNEE 3: CPT

## 2023-06-07 NOTE — PROGRESS NOTES
HPI:  Patient is a 81y o  year oldfemale  who presents for initial evaluation of right knee pain  Pain is rated a 6-9/10  She notes she has had knee pain for years  She notes she has difficulty straightening her knee  She had Euflexxa with Dr Jennifer Love with [address] around 2022 which provided relief for about 6 months  Pain was located at the lateral knee and is now located at the medial knee  She denies any new injuries to her knee  She has not attended physical therapy recently  She also previously had a corticosteroid injection without great relief in symptoms  She is not a diabetic or a smoker  Walking has been limited due to knee pain  The patient also suffers from fibromyalgia         ROS:   General: No fever, no chills, no weight loss, no weight gain  HEENT:  No loss of hearing, no nose bleeds, no sore throat  Eyes:  No eye pain, no red eyes, no visual disturbance  Respiratory:  No cough, no shortness of breath, no wheezing  Cardiovascular:  No chest pain, no palpitations, no edema  GI: No abdominal pain, no nausea, no vomiting  Endocrine: No frequent urination, no excessive thirst  Urinary:  No dysuria, no hematuria, no incontinence  Musculoskeletal: see HPI and PE  Skin:  No rash, no wounds  Neurological:  No dizziness, no headache, no numbness  Psychiatric:  No difficulty concentrating, no depression, no suicide thoughts, no anxiety  Review of all other systems is negative    PMH:  Past Medical History:   Diagnosis Date   • GERD (gastroesophageal reflux disease)    • HLD (hyperlipidemia)    • Hypertension    • Obesity        PSH:  Past Surgical History:   Procedure Laterality Date   • CARDIAC CATHETERIZATION  2020   •  SECTION     • EGD AND COLONOSCOPY  2023   • ESOPHAGOGASTRODUODENOSCOPY     • IR BIOPSY BONE MARROW  2022   • OVARIAN CYST REMOVAL         Medications:  Current Outpatient Medications   Medication Sig Dispense Refill   • atorvastatin (LIPITOR) 40 mg tablet Take 1 tablet (40 mg total) by mouth every evening 90 tablet 3   • Calcium-Magnesium-Vitamin D (CALCIUM MAGNESIUM PO) Take by mouth     • carvedilol (COREG) 25 mg tablet Take 1 tablet (25 mg total) by mouth 2 (two) times a day with meals 180 tablet 3   • esomeprazole (NexIUM) 20 mg capsule TAKE 1 CAPSULE DAILY EARLY IN THE MORNING 90 capsule 3   • Garlic 10 MG CAPS Take by mouth     • Multiple Vitamin (Multi Vitamin Daily) TABS Take by mouth     • ZINC GLUCONATE PO Take by mouth     • amLODIPine (NORVASC) 2 5 mg tablet TAKE 1 TABLET DAILY 90 tablet 3   • cetirizine (ZyrTEC) 10 mg tablet Take 1 tablet (10 mg total) by mouth daily (Patient not taking: Reported on 6/7/2023) 90 tablet 0   • Diclofenac Sodium (VOLTAREN) 1 % Apply 2 g topically 3 (three) times a day (Patient not taking: Reported on 4/19/2023) 350 g 1   • lubiprostone (AMITIZA) 24 mcg capsule Take 1 capsule (24 mcg total) by mouth 2 (two) times a day with meals (Patient not taking: Reported on 3/31/2023) 60 capsule 3   • Plant Sterols and Stanols (CHOLESTOFF PO) Take 1 tablet by mouth daily (Patient not taking: Reported on 6/7/2023)     • polyethylene glycol (GOLYTELY) 4000 mL solution Take as instructed on bowel preparation instructions  (Patient not taking: Reported on 6/7/2023) 4000 mL 0     No current facility-administered medications for this visit  Allergies:   Allergies   Allergen Reactions   • Penicillins      dizziness   • Seasonal Ic [Cholestatin] Sneezing and Eye Swelling     Does take zertec   • Latex Rash       Family History:  Family History   Problem Relation Age of Onset   • Hypertension Mother    • Arthritis Mother    • Heart disease Father    • No Known Problems Maternal Grandmother    • No Known Problems Maternal Grandfather    • No Known Problems Paternal Grandmother    • No Known Problems Paternal Grandfather    • No Known Problems Maternal Aunt    • No Known Problems Maternal Aunt        Social "History:  Social History     Occupational History   • Not on file   Tobacco Use   • Smoking status: Never   • Smokeless tobacco: Never   Vaping Use   • Vaping Use: Never used   Substance and Sexual Activity   • Alcohol use: Yes     Comment: social   • Drug use: Never   • Sexual activity: Not Currently       Physical Exam:  General :  Alert, cooperative, no distress, appears stated age  Blood pressure (!) 190/86, pulse 56, height 5' 3\" (1 6 m), weight 113 kg (249 lb), SpO2 98 %, not currently breastfeeding  Head:  Normocephalic, without obvious abnormality, atraumatic   Eyes:  Conjunctiva/corneas clear, EOM's intact,   Ears: Both ears normal appearance, no hearing deficits  Nose: Nares normal, septum midline, no drainage    Neck: Supple,  trachea midline, no adenopathy, no tenderness, no mass   Back:   Symmetric, no curvature, ROM normal, no tenderness   Lungs:   Respirations unlabored   Chest Wall:  No tenderness or deformity   Extremities: Extremities normal, atraumatic, no cyanosis or edema      Pulses: 2+ and symmetric   Skin: Skin color, texture, turgor normal, no rashes or lesions      Neurologic: Normal           Ortho Exam  Right Knee   Range of motion from 0 to 110  There is mild crepitus with range of motion  There is no effusion  There is tenderness over the medial joint line and pes anserinus  The patient is able to perform a straight leg raise  Toes are pink and mobile  Compartments are soft  Brisk cap refill  Sensation intact  No ligament laxity  The patient is neurovascular intact distally  Imaging Studies: The following imaging studies were reviewed in office today  My findings are noted  X-rays taken 6/7/2023 of right knee demonstrates severe degenerative changes of the medial tibiofemoral joint space with osteophyte formation, moderate to severe degenerative changes of the patellofemoral joint space  Assessment  Encounter Diagnoses   Name Primary?    • Primary " osteoarthritis of right knee    • Pes anserinus bursitis of right knee    • Chronic pain of right knee    • BMI 40 0-44 9, adult (HCC)          Plan:  Marlee Scanlon is a 81 y o  female with Right knee osteoarthritis    · The patient's x-rays were reviewed today  · Pre-authorization was requested for Euflexxa as this has been helpful to her in the past    · She was counseled on maintaining adequate range of motion  · She was advised to continue over the counter analgesics and topicals  · The patient was referred to weight management for additional assistance with weight loss   · Follow-up once viscosupplementation is approved    Scribe Attestation    I,:  Annie Alvarenga am acting as a scribe while in the presence of the attending physician :       I,:  Tanner Montoya MD personally performed the services described in this documentation    as scribed in my presence :

## 2023-06-14 DIAGNOSIS — I10 UNCONTROLLED HYPERTENSION: ICD-10-CM

## 2023-06-14 RX ORDER — AMLODIPINE BESYLATE 2.5 MG/1
TABLET ORAL
Qty: 90 TABLET | Refills: 3 | Status: SHIPPED | OUTPATIENT
Start: 2023-06-14

## 2023-06-21 ENCOUNTER — PROCEDURE VISIT (OUTPATIENT)
Dept: OBGYN CLINIC | Facility: CLINIC | Age: 73
End: 2023-06-21
Payer: COMMERCIAL

## 2023-06-21 VITALS
HEIGHT: 63 IN | WEIGHT: 238 LBS | BODY MASS INDEX: 42.17 KG/M2 | OXYGEN SATURATION: 99 % | HEART RATE: 79 BPM | DIASTOLIC BLOOD PRESSURE: 79 MMHG | SYSTOLIC BLOOD PRESSURE: 137 MMHG

## 2023-06-21 DIAGNOSIS — M17.11 PRIMARY OSTEOARTHRITIS OF RIGHT KNEE: Primary | ICD-10-CM

## 2023-06-21 PROCEDURE — 20610 DRAIN/INJ JOINT/BURSA W/O US: CPT | Performed by: ORTHOPAEDIC SURGERY

## 2023-06-21 NOTE — PROGRESS NOTES
"1  Primary osteoarthritis of right knee  Large joint arthrocentesis: R knee        Patient is here for her first injection of Gelsyn 3 into the right knee  Patient has reported improvements from previous visco injections  Pain is rated at 10/10  All organ systems normal  Physical exam of the knee shows no effusion no ecchymosis  Large joint arthrocentesis: R knee  Universal Protocol:  Consent: Verbal consent obtained  Risks and benefits: risks, benefits and alternatives were discussed  Consent given by: patient  Time out: Immediately prior to procedure a \"time out\" was called to verify the correct patient, procedure, equipment, support staff and site/side marked as required  Patient understanding: patient states understanding of the procedure being performed  Patient consent: the patient's understanding of the procedure matches consent given  Site marked: the operative site was marked  Patient identity confirmed: verbally with patient    Supporting Documentation  Indications: pain   Procedure Details  Location: knee - R knee  Preparation: Patient was prepped and draped in the usual sterile fashion  Needle size: 22 G  Ultrasound guidance: no  Approach: lateral  Medications administered: 2 mL sodium hyaluronate 16 8 MG/2ML  Specialty Pharmacy Supplied: received medications from pharmacy  Patient tolerance: patient tolerated the procedure well with no immediate complications  Dressing:  Sterile dressing applied          Patient tolerated procedure follow up next week for second Gelsyn injection       "

## 2023-06-28 ENCOUNTER — PROCEDURE VISIT (OUTPATIENT)
Dept: OBGYN CLINIC | Facility: CLINIC | Age: 73
End: 2023-06-28
Payer: COMMERCIAL

## 2023-06-28 VITALS
HEART RATE: 86 BPM | BODY MASS INDEX: 41.82 KG/M2 | DIASTOLIC BLOOD PRESSURE: 79 MMHG | SYSTOLIC BLOOD PRESSURE: 130 MMHG | HEIGHT: 63 IN | WEIGHT: 236 LBS | OXYGEN SATURATION: 98 %

## 2023-06-28 DIAGNOSIS — M17.11 PRIMARY OSTEOARTHRITIS OF RIGHT KNEE: Primary | ICD-10-CM

## 2023-06-28 PROCEDURE — 20610 DRAIN/INJ JOINT/BURSA W/O US: CPT | Performed by: ORTHOPAEDIC SURGERY

## 2023-06-28 NOTE — PROGRESS NOTES
1  Primary osteoarthritis of right knee  Large joint arthrocentesis: R knee        Patient is here for her 2nd injection of Gelsyn into the right knee  Patient has reported improvements from previous visco injections  Pain is rated at 8/10  All organ systems normal  Physical exam of the knee shows no effusion no ecchymosis  Large joint arthrocentesis: R knee  Universal Protocol:  Consent: Verbal consent obtained  Risks and benefits: risks, benefits and alternatives were discussed  Consent given by: patient  Timeout called at: 6/28/2023 8:42 AM   Patient understanding: patient states understanding of the procedure being performed  Site marked: the operative site was marked  Patient identity confirmed: verbally with patient    Supporting Documentation  Indications: pain and joint swelling   Procedure Details  Location: knee - R knee  Ultrasound guidance: no  Approach: anterolateral  Medications administered: 2 mL sodium hyaluronate 16 8 MG/2ML    Patient tolerance: patient tolerated the procedure well with no immediate complications  Dressing:  Sterile dressing applied            Patient tolerated procedure follow up next week for third and final Gelsyn injection       Scribe Attestation    I,:  Joanna Vergara am acting as a scribe while in the presence of the attending physician :       I,:  Humberto Mazariegos MD personally performed the services described in this documentation    as scribed in my presence :

## 2023-07-05 ENCOUNTER — PROCEDURE VISIT (OUTPATIENT)
Dept: OBGYN CLINIC | Facility: CLINIC | Age: 73
End: 2023-07-05
Payer: COMMERCIAL

## 2023-07-05 VITALS
SYSTOLIC BLOOD PRESSURE: 122 MMHG | OXYGEN SATURATION: 97 % | HEART RATE: 72 BPM | HEIGHT: 63 IN | WEIGHT: 235 LBS | BODY MASS INDEX: 41.64 KG/M2 | DIASTOLIC BLOOD PRESSURE: 81 MMHG

## 2023-07-05 DIAGNOSIS — M17.11 PRIMARY OSTEOARTHRITIS OF RIGHT KNEE: Primary | ICD-10-CM

## 2023-07-05 PROCEDURE — 20610 DRAIN/INJ JOINT/BURSA W/O US: CPT | Performed by: ORTHOPAEDIC SURGERY

## 2023-07-05 NOTE — PROGRESS NOTES
1. Primary osteoarthritis of right knee  Large joint arthrocentesis: R knee        Patient is here for her 3rd injection of Gelsyn into the right knee. Patient has reported improvements from previous visco injections. Pain is rated at 8/10. All organ systems normal  Physical exam of the knee shows no effusion no ecchymosis. Large joint arthrocentesis: R knee  Universal Protocol:  Consent: Verbal consent obtained. Risks and benefits: risks, benefits and alternatives were discussed  Consent given by: patient  Time out: Immediately prior to procedure a "time out" was called to verify the correct patient, procedure, equipment, support staff and site/side marked as required. Timeout called at: 7/5/2023 8:04 AM.  Patient understanding: patient states understanding of the procedure being performed  Site marked: the operative site was marked  Patient identity confirmed: verbally with patient    Supporting Documentation  Indications: pain   Procedure Details  Location: knee - R knee  Needle size: 22 G  Ultrasound guidance: no  Medications administered: 2 mL sodium hyaluronate 16.8 MG/2ML  Specialty Pharmacy Supplied: received medications from pharmacy  Patient tolerance: patient tolerated the procedure well with no immediate complications  Dressing:  Sterile dressing applied          Patient tolerated procedure follow up as needed.      Scribe Attestation    I,:  Sommer Novak am acting as a scribe while in the presence of the attending physician.:       I,:  Refugio Alexander MD personally performed the services described in this documentation    as scribed in my presence.:

## 2023-07-10 ENCOUNTER — OFFICE VISIT (OUTPATIENT)
Dept: URGENT CARE | Facility: CLINIC | Age: 73
End: 2023-07-10
Payer: COMMERCIAL

## 2023-07-10 VITALS
HEART RATE: 58 BPM | RESPIRATION RATE: 16 BRPM | DIASTOLIC BLOOD PRESSURE: 96 MMHG | TEMPERATURE: 96.7 F | OXYGEN SATURATION: 99 % | SYSTOLIC BLOOD PRESSURE: 148 MMHG

## 2023-07-10 DIAGNOSIS — J32.4 CHRONIC PANSINUSITIS: Primary | ICD-10-CM

## 2023-07-10 DIAGNOSIS — H69.83 EUSTACHIAN TUBE DYSFUNCTION, BILATERAL: ICD-10-CM

## 2023-07-10 PROCEDURE — 99213 OFFICE O/P EST LOW 20 MIN: CPT | Performed by: ORTHOPAEDIC SURGERY

## 2023-07-10 RX ORDER — TRIAMCINOLONE ACETONIDE 55 UG/1
2 SPRAY, METERED NASAL DAILY
Qty: 16.9 ML | Refills: 1 | Status: SHIPPED | OUTPATIENT
Start: 2023-07-10

## 2023-07-10 RX ORDER — CROMOLYN SODIUM 40 MG/ML
SOLUTION/ DROPS OPHTHALMIC
COMMUNITY
Start: 2023-05-04

## 2023-07-10 RX ORDER — LORATADINE 10 MG/1
10 TABLET ORAL DAILY
Qty: 30 TABLET | Refills: 1 | Status: SHIPPED | OUTPATIENT
Start: 2023-07-10

## 2023-07-10 RX ORDER — CYCLOSPORINE 0.5 MG/ML
EMULSION OPHTHALMIC
COMMUNITY
Start: 2023-05-04

## 2023-07-10 NOTE — PROGRESS NOTES
[facility]        NAME: Chela Vaughn is a 81 y.o. female  : 1950    MRN: [id number]  DATE: July 10, 2023  TIME: 10:47 AM    Assessment and Plan   Chronic pansinusitis [J32.4]  1. Chronic pansinusitis  Triamcinolone Acetonide (Nasacort Allergy 24HR) 55 MCG/ACT nasal spray    loratadine (CLARITIN) 10 mg tablet            Patient Instructions       Follow up with PCP in 3-5 days. Proceed to  ER if symptoms worsen. Chief Complaint     Chief Complaint   Patient presents with   • Cold Like Symptoms     USUALLY HAS SEASONAL ALLERGIES ENDING IN MARCH, [address] STOPPED. WORSENING OVER TIME. SHAKEY HANDS, AND SOME CHEST TIGHTING HERE AND THERE, AND FEELS COLD IS WORSENING. History of Present Illness       81 YOF presents to the urgent care for evaluation of ear fullness, chest congestion, worsening seasonal allergies. The patient states that her allergies usually last up to April, though this time her symptoms have persisted. She notes that she often feels a heaviness in her chest, as though she cannot take a deep breath. The patient reports feelings of lightheadedness today. She complains of a fullness/ringing in the ears. The patient denies any n/v/d, but does admit to a history of constipation. She notes a dry cough, having difficulty producing anything. She denies any history of asthma or copd. The patient takes coricidin chest and congestion medication, but denies any frequent use of anti-histamines. She did once take Zyrtec regularly, but had stopped. She has never seen an allergist.           Review of Systems   Review of Systems   Constitutional: Negative for chills and fever. HENT: Positive for congestion and rhinorrhea. Negative for ear pain (fullness in both ears) and sore throat. Eyes: Negative for pain and visual disturbance. Respiratory: Positive for cough, chest tightness and shortness of breath. Negative for wheezing.     Cardiovascular: Negative for chest pain and palpitations. Gastrointestinal: Positive for constipation. Negative for abdominal pain, diarrhea, nausea and vomiting. Genitourinary: Negative for dysuria and hematuria. Musculoskeletal: Negative for arthralgias and back pain. Skin: Negative for color change and rash. Neurological: Positive for dizziness and light-headedness. Negative for seizures, syncope and headaches. Psychiatric/Behavioral: Negative. All other systems reviewed and are negative.         Current Medications       Current Outpatient Medications:   •  amLODIPine (NORVASC) 2.5 mg tablet, TAKE 1 TABLET DAILY, Disp: 90 tablet, Rfl: 3  •  atorvastatin (LIPITOR) 40 mg tablet, Take 1 tablet (40 mg total) by mouth every evening, Disp: 90 tablet, Rfl: 3  •  Calcium-Magnesium-Vitamin D (CALCIUM MAGNESIUM PO), Take by mouth, Disp: , Rfl:   •  carvedilol (COREG) 25 mg tablet, Take 1 tablet (25 mg total) by mouth 2 (two) times a day with meals, Disp: 180 tablet, Rfl: 3  •  cromolyn (OPTICROM) 4 % ophthalmic solution, , Disp: , Rfl:   •  cycloSPORINE (RESTASIS) 0.05 % ophthalmic emulsion, , Disp: , Rfl:   •  esomeprazole (NexIUM) 20 mg capsule, TAKE 1 CAPSULE DAILY EARLY IN THE MORNING, Disp: 90 capsule, Rfl: 3  •  loratadine (CLARITIN) 10 mg tablet, Take 1 tablet (10 mg total) by mouth daily, Disp: 30 tablet, Rfl: 1  •  Multiple Vitamin (Multi Vitamin Daily) TABS, Take by mouth, Disp: , Rfl:   •  Triamcinolone Acetonide (Nasacort Allergy 24HR) 55 MCG/ACT nasal spray, 2 sprays by Each Nare route daily, Disp: 16.9 mL, Rfl: 1  •  cetirizine (ZyrTEC) 10 mg tablet, Take 1 tablet (10 mg total) by mouth daily (Patient not taking: Reported on 6/7/2023), Disp: 90 tablet, Rfl: 0  •  Diclofenac Sodium (VOLTAREN) 1 %, Apply 2 g topically 3 (three) times a day (Patient not taking: Reported on 4/19/2023), Disp: 350 g, Rfl: 1  •  Garlic 10 MG CAPS, Take by mouth, Disp: , Rfl:   •  lubiprostone (AMITIZA) 24 mcg capsule, Take 1 capsule (24 mcg total) by mouth 2 (two) times a day with meals (Patient not taking: Reported on 3/31/2023), Disp: 60 capsule, Rfl: 3  •  Plant Sterols and Stanols (CHOLESTOFF PO), Take 1 tablet by mouth daily (Patient not taking: Reported on 2023), Disp: , Rfl:   •  polyethylene glycol (GOLYTELY) 4000 mL solution, Take as instructed on bowel preparation instructions. (Patient not taking: Reported on 2023), Disp: 4000 mL, Rfl: 0  •  ZINC GLUCONATE PO, Take by mouth, Disp: , Rfl:     Current Allergies     Allergies as of 07/10/2023 - Reviewed 07/10/2023   Allergen Reaction Noted   • Penicillins  10/10/2019   • Seasonal ic [cholestatin] Sneezing and Eye Swelling 2022   • Latex Rash 2020            The following portions of the patient's history were reviewed and updated as appropriate: allergies, current medications, past family history, past medical history, past social history, past surgical history and problem list.     Past Medical History:   Diagnosis Date   • GERD (gastroesophageal reflux disease)    • HLD (hyperlipidemia)    • Hypertension    • Obesity        Past Surgical History:   Procedure Laterality Date   • CARDIAC CATHETERIZATION  2020   •  SECTION     • EGD AND COLONOSCOPY  2023   • ESOPHAGOGASTRODUODENOSCOPY     • IR BIOPSY BONE MARROW  2022   • OVARIAN CYST REMOVAL         Family History   Problem Relation Age of Onset   • Hypertension Mother    • Arthritis Mother    • Heart disease Father    • No Known Problems Maternal Grandmother    • No Known Problems Maternal Grandfather    • No Known Problems Paternal Grandmother    • No Known Problems Paternal Grandfather    • No Known Problems Maternal Aunt    • No Known Problems Maternal Aunt          Medications have been verified. Objective   /96   Pulse 58   Temp (!) 96.7 °F (35.9 °C)   Resp 16   SpO2 99%        Physical Exam     Physical Exam  Vitals and nursing note reviewed.    Constitutional:       Appearance: Normal appearance. HENT:      Head: Normocephalic and atraumatic. Right Ear: A middle ear effusion is present. Left Ear: Tympanic membrane normal.      Nose: Nose normal.      Mouth/Throat:      Pharynx: Oropharynx is clear. Comments: cobblestoning  Eyes:      Extraocular Movements: Extraocular movements intact. Pupils: Pupils are equal, round, and reactive to light. Cardiovascular:      Rate and Rhythm: Normal rate and regular rhythm. Pulses: Normal pulses. Heart sounds: Normal heart sounds. Pulmonary:      Effort: Pulmonary effort is normal. No respiratory distress. Breath sounds: Normal breath sounds. No wheezing or rhonchi. Abdominal:      Palpations: Abdomen is soft. Musculoskeletal:         General: Normal range of motion. Cervical back: Normal range of motion. Tenderness present. Lymphadenopathy:      Cervical: No cervical adenopathy. Skin:     General: Skin is warm and dry. Capillary Refill: Capillary refill takes less than 2 seconds. Neurological:      General: No focal deficit present. Mental Status: She is alert and oriented to person, place, and time.    Psychiatric:         Mood and Affect: Mood normal.         Behavior: Behavior normal.

## 2023-11-15 ENCOUNTER — APPOINTMENT (OUTPATIENT)
Dept: LAB | Facility: HOSPITAL | Age: 73
End: 2023-11-15
Payer: COMMERCIAL

## 2023-11-15 DIAGNOSIS — D47.2 SMOLDERING MYELOMA: ICD-10-CM

## 2023-11-15 LAB
ALBUMIN SERPL BCP-MCNC: 3.5 G/DL (ref 3.5–5)
ALP SERPL-CCNC: 82 U/L (ref 34–104)
ALT SERPL W P-5'-P-CCNC: 12 U/L (ref 7–52)
ANION GAP SERPL CALCULATED.3IONS-SCNC: 5 MMOL/L
AST SERPL W P-5'-P-CCNC: 17 U/L (ref 13–39)
BASOPHILS # BLD AUTO: 0.06 THOUSANDS/ÂΜL (ref 0–0.1)
BASOPHILS NFR BLD AUTO: 1 % (ref 0–1)
BILIRUB SERPL-MCNC: 0.41 MG/DL (ref 0.2–1)
BUN SERPL-MCNC: 12 MG/DL (ref 5–25)
CALCIUM SERPL-MCNC: 9.2 MG/DL (ref 8.4–10.2)
CHLORIDE SERPL-SCNC: 108 MMOL/L (ref 96–108)
CO2 SERPL-SCNC: 29 MMOL/L (ref 21–32)
CREAT SERPL-MCNC: 0.92 MG/DL (ref 0.6–1.3)
EOSINOPHIL # BLD AUTO: 0.52 THOUSAND/ÂΜL (ref 0–0.61)
EOSINOPHIL NFR BLD AUTO: 9 % (ref 0–6)
ERYTHROCYTE [DISTWIDTH] IN BLOOD BY AUTOMATED COUNT: 14.3 % (ref 11.6–15.1)
GFR SERPL CREATININE-BSD FRML MDRD: 63 ML/MIN/1.73SQ M
GLUCOSE P FAST SERPL-MCNC: 90 MG/DL (ref 65–99)
HCT VFR BLD AUTO: 38.9 % (ref 34.8–46.1)
HGB BLD-MCNC: 12.8 G/DL (ref 11.5–15.4)
IGA SERPL-MCNC: 1071 MG/DL (ref 66–433)
IGG SERPL-MCNC: 995 MG/DL (ref 635–1741)
IGM SERPL-MCNC: <20 MG/DL (ref 45–281)
IMM GRANULOCYTES # BLD AUTO: 0.02 THOUSAND/UL (ref 0–0.2)
IMM GRANULOCYTES NFR BLD AUTO: 0 % (ref 0–2)
LYMPHOCYTES # BLD AUTO: 1.34 THOUSANDS/ÂΜL (ref 0.6–4.47)
LYMPHOCYTES NFR BLD AUTO: 23 % (ref 14–44)
MCH RBC QN AUTO: 31.1 PG (ref 26.8–34.3)
MCHC RBC AUTO-ENTMCNC: 32.9 G/DL (ref 31.4–37.4)
MCV RBC AUTO: 94 FL (ref 82–98)
MONOCYTES # BLD AUTO: 0.51 THOUSAND/ÂΜL (ref 0.17–1.22)
MONOCYTES NFR BLD AUTO: 9 % (ref 4–12)
NEUTROPHILS # BLD AUTO: 3.43 THOUSANDS/ÂΜL (ref 1.85–7.62)
NEUTS SEG NFR BLD AUTO: 58 % (ref 43–75)
NRBC BLD AUTO-RTO: 0 /100 WBCS
PLATELET # BLD AUTO: 239 THOUSANDS/UL (ref 149–390)
PMV BLD AUTO: 8.9 FL (ref 8.9–12.7)
POTASSIUM SERPL-SCNC: 4 MMOL/L (ref 3.5–5.3)
PROT SERPL-MCNC: 7.3 G/DL (ref 6.4–8.4)
RBC # BLD AUTO: 4.12 MILLION/UL (ref 3.81–5.12)
SODIUM SERPL-SCNC: 142 MMOL/L (ref 135–147)
WBC # BLD AUTO: 5.88 THOUSAND/UL (ref 4.31–10.16)

## 2023-11-15 PROCEDURE — 85025 COMPLETE CBC W/AUTO DIFF WBC: CPT

## 2023-11-15 PROCEDURE — 36415 COLL VENOUS BLD VENIPUNCTURE: CPT

## 2023-11-15 PROCEDURE — 80053 COMPREHEN METABOLIC PANEL: CPT

## 2023-11-15 PROCEDURE — 86334 IMMUNOFIX E-PHORESIS SERUM: CPT

## 2023-11-15 PROCEDURE — 84165 PROTEIN E-PHORESIS SERUM: CPT

## 2023-11-15 PROCEDURE — 83521 IG LIGHT CHAINS FREE EACH: CPT

## 2023-11-15 PROCEDURE — 82784 ASSAY IGA/IGD/IGG/IGM EACH: CPT

## 2023-11-16 LAB
KAPPA LC FREE SER-MCNC: 45.6 MG/L (ref 3.3–19.4)
KAPPA LC FREE/LAMBDA FREE SER: 1.23 {RATIO} (ref 0.26–1.65)
LAMBDA LC FREE SERPL-MCNC: 37.2 MG/L (ref 5.7–26.3)

## 2023-11-18 LAB
ALBUMIN SERPL ELPH-MCNC: 3.31 G/DL (ref 3.2–5.1)
ALBUMIN SERPL ELPH-MCNC: 49.4 % (ref 48–70)
ALPHA1 GLOB SERPL ELPH-MCNC: 0.27 G/DL (ref 0.15–0.47)
ALPHA1 GLOB SERPL ELPH-MCNC: 4.1 % (ref 1.8–7)
ALPHA2 GLOB SERPL ELPH-MCNC: 0.65 G/DL (ref 0.42–1.04)
ALPHA2 GLOB SERPL ELPH-MCNC: 9.7 % (ref 5.9–14.9)
BETA GLOB ABNORMAL SERPL ELPH-MCNC: 0.87 G/DL (ref 0.31–0.57)
BETA1 GLOB SERPL ELPH-MCNC: 13 % (ref 4.7–7.7)
BETA2 GLOB SERPL ELPH-MCNC: 9.6 % (ref 3.1–7.9)
BETA2+GAMMA GLOB SERPL ELPH-MCNC: 0.64 G/DL (ref 0.2–0.58)
GAMMA GLOB ABNORMAL SERPL ELPH-MCNC: 0.95 G/DL (ref 0.4–1.66)
GAMMA GLOB SERPL ELPH-MCNC: 14.2 % (ref 6.9–22.3)
IGG/ALB SER: 0.98 {RATIO} (ref 1.1–1.8)
INTERPRETATION UR IFE-IMP: NORMAL
PROT PATTERN SERPL ELPH-IMP: ABNORMAL
PROT SERPL-MCNC: 6.7 G/DL (ref 6.4–8.2)

## 2023-11-18 PROCEDURE — 84165 PROTEIN E-PHORESIS SERUM: CPT | Performed by: STUDENT IN AN ORGANIZED HEALTH CARE EDUCATION/TRAINING PROGRAM

## 2023-11-18 PROCEDURE — 86334 IMMUNOFIX E-PHORESIS SERUM: CPT | Performed by: STUDENT IN AN ORGANIZED HEALTH CARE EDUCATION/TRAINING PROGRAM

## 2023-12-13 ENCOUNTER — OFFICE VISIT (OUTPATIENT)
Dept: CARDIOLOGY CLINIC | Facility: CLINIC | Age: 73
End: 2023-12-13
Payer: COMMERCIAL

## 2023-12-13 VITALS
HEART RATE: 54 BPM | HEIGHT: 63 IN | RESPIRATION RATE: 16 BRPM | WEIGHT: 238 LBS | SYSTOLIC BLOOD PRESSURE: 160 MMHG | BODY MASS INDEX: 42.17 KG/M2 | OXYGEN SATURATION: 96 % | DIASTOLIC BLOOD PRESSURE: 110 MMHG

## 2023-12-13 DIAGNOSIS — E78.00 PURE HYPERCHOLESTEROLEMIA: ICD-10-CM

## 2023-12-13 DIAGNOSIS — I10 PRIMARY HYPERTENSION: Primary | ICD-10-CM

## 2023-12-13 DIAGNOSIS — I10 UNCONTROLLED HYPERTENSION: ICD-10-CM

## 2023-12-13 DIAGNOSIS — D47.2 SMOLDERING MYELOMA: ICD-10-CM

## 2023-12-13 DIAGNOSIS — K21.9 GASTROESOPHAGEAL REFLUX DISEASE WITHOUT ESOPHAGITIS: ICD-10-CM

## 2023-12-13 PROCEDURE — 99213 OFFICE O/P EST LOW 20 MIN: CPT | Performed by: NURSE PRACTITIONER

## 2023-12-13 RX ORDER — AMLODIPINE BESYLATE 2.5 MG/1
2.5 TABLET ORAL DAILY PRN
Start: 2023-12-13

## 2023-12-13 RX ORDER — CARVEDILOL 25 MG/1
25 TABLET ORAL 2 TIMES DAILY WITH MEALS
Qty: 180 TABLET | Refills: 3 | Status: SHIPPED | OUTPATIENT
Start: 2023-12-13

## 2023-12-13 RX ORDER — ATORVASTATIN CALCIUM 40 MG/1
40 TABLET, FILM COATED ORAL EVERY EVENING
Qty: 90 TABLET | Refills: 3 | Status: SHIPPED | OUTPATIENT
Start: 2023-12-13

## 2023-12-13 NOTE — PROGRESS NOTES
Cardiology Office Note    Lulu Barry 72 y.o. female MRN: [id number]    12/13/23          Assessment/Plan:    1. Hypertension  - Patient reports ambulatory blood pressure is well-controlled  - Continue amlodipine, carvedilol  -Continue ambulatory blood pressure monitoring and low-sodium diet    2. Hyperlipidemia  - Continue atorvastatin    3. Mild aortic stenosis  - Continue periodic outpatient surveillance    4. Smoldering myeloma    Follow up: 6 months or sooner as needed    1. Primary hypertension  carvedilol (COREG) 25 mg tablet      2. Pure hypercholesterolemia  atorvastatin (LIPITOR) 40 mg tablet      3. Gastroesophageal reflux disease without esophagitis  esomeprazole (NexIUM) 20 mg capsule      4. Uncontrolled hypertension  amLODIPine (NORVASC) 2.5 mg tablet      5. Smoldering myeloma            HPI: Lulu Barry is a 72y.o. year old female with a past medical history of hypertension, hyperlipidemia, GERD, a mild aortic stenosis and smoldering myeloma, osteoarthritis, fibromyalgia, seasonal allergies, and biclonal gammopathy who presents today for an office visit for routine follow-up. Today she reports that although her blood pressure is elevated here in the office, it is continually normal and we have from the 361W to 984Z systolic in the 27E 53P diastolic. She denies chest pain, dyspnea on exertion or rest, lower extremity edema, lightheadedness, dizziness, syncopal episodes, or palpitations and was instructed to call  the office or seek medical attention if any such symptoms develop. She does endorse that she has multiple aches throughout her body experiences chronic pain related to joint pain and her osteoarthritis and fibromyalgia.     Her most recent TTE was performed in June 2022 that revealed normal systolic function with an EF of 60% with no regional wall motion abnormalities and normal diastolic function, mild aortic stenosis and regurgitation and mild mitral valve regurgitation. All medications reviewed and patient is tolerating medications without side effects. Refills were sent if needed.      Family History: Father with a history of CAD in his 80s    Social history: Denies EtOH, drug abuse, and tobacco use    Patient Active Problem List   Diagnosis    GERD (gastroesophageal reflux disease)    Colon cancer screening    Chest pain    Obesity, morbid ([address])    Hypertension    Chronic left shoulder pain    Smoldering myeloma    Hyperlipidemia    Hypertensive urgency    De Quervain's tenosynovitis, left    Left wrist pain    Osteoarthritis of carpometacarpal (CMC) joint of left thumb       Allergies   Allergen Reactions    Penicillins      dizziness    Seasonal Ic [Cholestatin] Sneezing and Eye Swelling     Does take zertec    Latex Rash         Current Outpatient Medications:     amLODIPine (NORVASC) 2.5 mg tablet, Take 1 tablet (2.5 mg total) by mouth daily as needed (if BP elevated), Disp: , Rfl:     atorvastatin (LIPITOR) 40 mg tablet, Take 1 tablet (40 mg total) by mouth every evening, Disp: 90 tablet, Rfl: 3    Calcium-Magnesium-Vitamin D (CALCIUM MAGNESIUM PO), Take by mouth, Disp: , Rfl:     carvedilol (COREG) 25 mg tablet, Take 1 tablet (25 mg total) by mouth 2 (two) times a day with meals, Disp: 180 tablet, Rfl: 3    cycloSPORINE (RESTASIS) 0.05 % ophthalmic emulsion, , Disp: , Rfl:     esomeprazole (NexIUM) 20 mg capsule, Take 1 capsule (20 mg total) by mouth daily in the early morning, Disp: 90 capsule, Rfl: 3    loratadine (CLARITIN) 10 mg tablet, Take 1 tablet (10 mg total) by mouth daily, Disp: 30 tablet, Rfl: 1    Multiple Vitamin (Multi Vitamin Daily) TABS, Take by mouth, Disp: , Rfl:     Triamcinolone Acetonide (Nasacort Allergy 24HR) 55 MCG/ACT nasal spray, 2 sprays by Each Nare route daily, Disp: 16.9 mL, Rfl: 1    Past Medical History:   Diagnosis Date    GERD (gastroesophageal reflux disease)     HLD (hyperlipidemia)     Hypertension     Obesity Family History   Problem Relation Age of Onset    Hypertension Mother     Arthritis Mother     Heart disease Father     No Known Problems Maternal Grandmother     No Known Problems Maternal Grandfather     No Known Problems Paternal Grandmother     No Known Problems Paternal Grandfather     No Known Problems Maternal Aunt     No Known Problems Maternal Aunt        Past Surgical History:   Procedure Laterality Date    CARDIAC CATHETERIZATION  2020     SECTION      EGD AND COLONOSCOPY  2023    ESOPHAGOGASTRODUODENOSCOPY      IR BIOPSY BONE MARROW  2022    OVARIAN CYST REMOVAL         Social History     Socioeconomic History    Marital status: /Civil Union     Spouse name: Not on file    Number of children: Not on file    Years of education: Not on file    Highest education level: Not on file   Occupational History    Not on file   Tobacco Use    Smoking status: Never    Smokeless tobacco: Never   Vaping Use    Vaping status: Never Used   Substance and Sexual Activity    Alcohol use: Yes     Comment: social    Drug use: Never    Sexual activity: Not Currently   Other Topics Concern    Not on file   Social History Narrative    Not on file     Social Determinants of Health     Financial Resource Strain: Low Risk  (3/31/2023)    Overall Financial Resource Strain (CARDIA)     Difficulty of Paying Living Expenses: Not hard at all   Food Insecurity: Not on file   Transportation Needs: No Transportation Needs (3/31/2023)    PRAPARE - Transportation     Lack of Transportation (Medical): No     Lack of Transportation (Non-Medical):  No   Physical Activity: Not on file   Stress: Not on file   Social Connections: Not on file   Intimate Partner Violence: Not on file   Housing Stability: Not on file       Review of symptoms:   Constitution:  Negative  HEENT:  Negative  Cardiovascular:  Negative  Respiratory:  Negative  Skin:  Negative  Gastrointestinal:  Negative  Genitourinary: Negative  Musculoskeletal:  Negative  Neurological:  Negative  Endocrine:  Negative  Psychological:  Negative    Vitals: BP (!) 160/110 (BP Location: Left arm, Patient Position: Sitting, Cuff Size: Standard)   Pulse (!) 54   Resp 16   Ht 5' 3" (1.6 m)   Wt 108 kg (238 lb)   SpO2 96%   BMI 42.16 kg/m²         Physical Exam:     GEN: Alert and oriented x 3, in no acute distress. Well appearing and well nourished. HEENT: Sclera anicteric, conjunctivae pink, mucous membranes moist.   NECK: Supple, no carotid bruits, no significant JVD. Trachea midline. HEART: Regular rhythm, normal S1 and S2, no murmurs, clicks, gallops or rubs. LUNGS: Clear to auscultation bilaterally; no wheezes, rales, or rhonchi. No increased work of breathing or signs of respiratory distress. ABDOMEN: Soft, nontender, nondistended, normoactive bowel sounds. EXTREMITIES: Skin warm and well perfused, no clubbing, cyanosis, or edema. NEURO: No focal findings. Normal speech. Mood and affect normal.   SKIN: Normal without suspicious lesions on exposed skin.

## 2024-01-17 ENCOUNTER — TELEPHONE (OUTPATIENT)
Dept: FAMILY MEDICINE CLINIC | Facility: CLINIC | Age: 74
End: 2024-01-17

## 2024-01-17 NOTE — TELEPHONE ENCOUNTER
T/c from RAHUL with [facility] who was doing home visit with pt.    Called to report she heard a cardiac murmur -- pt was unaware that she had this.  Would like to request Dr Manzanares review chart and advise on whether or not this is something the pt has noted previously.    Also, reporting pt has +2 pitting edema in both ankles

## 2024-01-17 NOTE — TELEPHONE ENCOUNTER
Yes, per her chart, she has a history of a grade 2 holosystolic murmur.     Nelida Manzanares DO  [facility]  1/17/2024 1:00 PM

## 2024-01-17 NOTE — TELEPHONE ENCOUNTER
She can have the documentation from cardiology. Has known aortic stenosis. Murmur appears to be transient as it is not present in all documented notes. Last noted in cardiology note on 2/3/23.    DO Luis [facility]  1/17/2024 1:04 PM       You have a finding on your left mammogram that needs to be further evaluated. I have sent an order to [facility] to have this done. This is precautionary. Thanks.   [address]

## 2024-01-17 NOTE — TELEPHONE ENCOUNTER
Spoke with pt -- does not recall ever being told about this -- would like additional information about the condition if possible, and also would like a copy of the note that includes it (please advise on which note).

## 2024-01-17 NOTE — TELEPHONE ENCOUNTER
Spoke with pt -- gave Dr Manzanares's advisement and email copy of last cardio note that has mention of the murmur

## 2024-02-21 PROBLEM — Z12.11 COLON CANCER SCREENING: Status: RESOLVED | Noted: 2019-11-05 | Resolved: 2024-02-21

## 2024-04-29 ENCOUNTER — APPOINTMENT (OUTPATIENT)
Dept: LAB | Facility: HOSPITAL | Age: 74
End: 2024-04-29

## 2024-04-29 ENCOUNTER — TELEPHONE (OUTPATIENT)
Dept: OBGYN CLINIC | Facility: HOSPITAL | Age: 74
End: 2024-04-29

## 2024-05-01 ENCOUNTER — APPOINTMENT (OUTPATIENT)
Dept: LAB | Facility: HOSPITAL | Age: 74
End: 2024-05-01
Payer: COMMERCIAL

## 2024-05-01 DIAGNOSIS — T78.40XS ALLERGY, SEQUELA: ICD-10-CM

## 2024-05-01 PROCEDURE — 86335 IMMUNFIX E-PHORSIS/URINE/CSF: CPT

## 2024-05-01 PROCEDURE — 84166 PROTEIN E-PHORESIS/URINE/CSF: CPT

## 2024-05-01 PROCEDURE — 84156 ASSAY OF PROTEIN URINE: CPT

## 2024-05-01 PROCEDURE — 36415 COLL VENOUS BLD VENIPUNCTURE: CPT

## 2024-05-02 LAB
ALBUMIN UR ELPH-MCNC: 100 %
ALPHA1 GLOB MFR UR ELPH: 0 %
ALPHA2 GLOB MFR UR ELPH: 0 %
B-GLOBULIN MFR UR ELPH: 0 %
GAMMA GLOB MFR UR ELPH: 0 %
PROT PATTERN UR ELPH-IMP: NORMAL
PROT UR-MCNC: 4 MG/DL

## 2024-05-02 PROCEDURE — 84166 PROTEIN E-PHORESIS/URINE/CSF: CPT | Performed by: STUDENT IN AN ORGANIZED HEALTH CARE EDUCATION/TRAINING PROGRAM

## 2024-05-06 ENCOUNTER — APPOINTMENT (OUTPATIENT)
Dept: RADIOLOGY | Facility: CLINIC | Age: 74
End: 2024-05-06
Payer: COMMERCIAL

## 2024-05-06 ENCOUNTER — OFFICE VISIT (OUTPATIENT)
Dept: OBGYN CLINIC | Facility: CLINIC | Age: 74
End: 2024-05-06
Payer: COMMERCIAL

## 2024-05-06 VITALS
RESPIRATION RATE: 14 BRPM | SYSTOLIC BLOOD PRESSURE: 142 MMHG | DIASTOLIC BLOOD PRESSURE: 88 MMHG | BODY MASS INDEX: 41.64 KG/M2 | WEIGHT: 235 LBS | OXYGEN SATURATION: 98 % | HEART RATE: 62 BPM | HEIGHT: 63 IN

## 2024-05-06 DIAGNOSIS — M25.561 CHRONIC PAIN OF RIGHT KNEE: ICD-10-CM

## 2024-05-06 DIAGNOSIS — M17.12 PRIMARY OSTEOARTHRITIS OF LEFT KNEE: ICD-10-CM

## 2024-05-06 DIAGNOSIS — G89.29 CHRONIC PAIN OF RIGHT KNEE: ICD-10-CM

## 2024-05-06 DIAGNOSIS — M17.11 PRIMARY OSTEOARTHRITIS OF RIGHT KNEE: Primary | ICD-10-CM

## 2024-05-06 PROCEDURE — 73560 X-RAY EXAM OF KNEE 1 OR 2: CPT

## 2024-05-06 PROCEDURE — 99214 OFFICE O/P EST MOD 30 MIN: CPT | Performed by: FAMILY MEDICINE

## 2024-05-06 RX ORDER — GABAPENTIN 100 MG/1
CAPSULE ORAL
COMMUNITY
Start: 2024-04-24

## 2024-05-06 NOTE — PROGRESS NOTES
Subjective:    Chief Complaint   Patient presents with    Right Knee - Pain     Wants xray done     Left Knee - Pain     Wants xray done        Jordyn Germain is a 73 y.o. female complains of bilateral knee pain. Onset of the symptoms was  several years, however worsening over past few weeks .  Mechanism of injury:  none . Aggravating factors: walking  and weight bearing. Treatment to date: corticosteroid injection which was not very effective and visco injection therapy somewhat effective . Symptoms have progressed to a point and plateaued.      The following portions were reviewed and updated as needed: allergies, current medications, past medical history, past social history, past surgical history and problem list.    Review of Systems   Constitutional: Negative for fever.   HENT: Negative for dental problem and headaches.    Eyes: Negative for vision loss.   Respiratory: Negative for cough and shortness of breath.    Cardiovascular: Negative for leg swelling and palpitations.   Gastrointestinal: Negative for constipation and diarrhea.   Genitourinary: Negative for bladder incontinence and difficulty urinating.   Musculoskeletal: Negative for back pain and difficulty walking.   Skin: Negative for rash and ulcer.   Neurological: Negative for dizziness and headaches.   Hem/Lymph/Immuno: Negative for blood clots. Does not bruise/bleed easily.   Psychiatric/Behavioral: Negative for confusion.         Objective:  General: no acute distress, non toxic, AAO x3   Skin: no skin changes, no rashes, no wounds or laceration  Vasculature: normal cap refill, no LE edema, normal popliteal and dorsalis pedis pulse  Neurologic:   Musculoskeletal: bilateral KNEE EXAM  Gait: limping gait positive, able to weight bear without difficulty  Inspection: No gross deformity, no redness or warmth   Effusion: negative   Medial joint line TTP: positive  Lateral joint line TTP: positive  ROM: Full flexion and extension  [facility]'s:  negative,   Instability to varus/valgus stress: negative  Anterior Drawer: negative   Lachman's test: negative  Posterior Drawer: negative          Imaging:       Assessment/Plan:  1. Primary osteoarthritis of right knee      2. Chronic pain of right knee      3. Primary osteoarthritis of left knee    - XR knee 1 or 2 vw left; Future    73-year-old female patient who is presenting today for bilateral knee osteoarthritis.  She had been following with Dr. Mcgrath and receiving Visco injection therapy for the right knee and states that the injection therapy had helped however is not lasting as long as the initial injection.  She has had developing left knee pain over the past several weeks which has prevented her from performing daily activity.  Updated radiographs of the left knee is obtained at today's office visit and reviewed independently.  Degenerative changes noted.  Follow-up on official radiology report.  We discussed treatment options for bilateral knee osteoarthritis.  Given her positive response to Visco injection therapy in the past, prior authorization will be submitted for repeat Visco injection therapy for bilateral knee.  In addition to left knee osteoarthritis, symptoms of lower extremity pain may also be explained by referred pain from discogenic lumbar etiology.  She is scheduled to get an MRI of the lumbar spine and I encouraged her to keep this visit appointment for further evaluation.  She may benefit from seeking consultation with spine and pain management pending the results of the MRI.

## 2024-05-07 ENCOUNTER — HOSPITAL ENCOUNTER (OUTPATIENT)
Dept: MRI IMAGING | Facility: HOSPITAL | Age: 74
Discharge: HOME | End: 2024-05-07
Payer: COMMERCIAL

## 2024-05-07 DIAGNOSIS — C90.00 MULTIPLE MYELOMA NOT HAVING ACHIEVED REMISSION (HCC): ICD-10-CM

## 2024-05-07 DIAGNOSIS — Z87.39 PERSONAL HISTORY OF OTHER DISEASES OF THE MUSCULOSKELETAL SYSTEM AND CONNECTIVE TISSUE: ICD-10-CM

## 2024-05-07 PROCEDURE — A9585 GADOBUTROL INJECTION: HCPCS | Performed by: RADIOLOGY

## 2024-05-07 PROCEDURE — 72158 MRI LUMBAR SPINE W/O & W/DYE: CPT

## 2024-05-07 PROCEDURE — 72157 MRI CHEST SPINE W/O & W/DYE: CPT

## 2024-05-07 RX ORDER — GADOBUTROL 604.72 MG/ML
10 INJECTION INTRAVENOUS
Status: COMPLETED | OUTPATIENT
Start: 2024-05-07 | End: 2024-05-07

## 2024-05-07 RX ADMIN — GADOBUTROL 10 ML: 604.72 INJECTION INTRAVENOUS at 06:54

## 2024-05-08 LAB
Lab: 3467
Lab: NORMAL
Lab: NORMAL
MISCELLANEOUS LAB TEST RESULT: NORMAL

## 2024-05-14 ENCOUNTER — APPOINTMENT (OUTPATIENT)
Dept: LAB | Facility: HOSPITAL | Age: 74
End: 2024-05-14

## 2024-05-17 ENCOUNTER — APPOINTMENT (OUTPATIENT)
Dept: LAB | Facility: HOSPITAL | Age: 74
End: 2024-05-17
Payer: COMMERCIAL

## 2024-05-17 DIAGNOSIS — D52.8 OTHER FOLATE DEFICIENCY ANEMIAS: ICD-10-CM

## 2024-05-17 DIAGNOSIS — G89.4 CHRONIC PAIN SYNDROME: ICD-10-CM

## 2024-05-17 DIAGNOSIS — N83.202 UNSPECIFIED OVARIAN CYST, LEFT SIDE: ICD-10-CM

## 2024-05-17 DIAGNOSIS — M25.552 PAIN IN LEFT HIP: ICD-10-CM

## 2024-05-17 DIAGNOSIS — D51.0 VITAMIN B12 DEFICIENCY ANEMIA DUE TO INTRINSIC FACTOR DEFICIENCY: ICD-10-CM

## 2024-05-17 DIAGNOSIS — C90.00 MULTIPLE MYELOMA, FAILED REMISSION (HCC): ICD-10-CM

## 2024-05-17 DIAGNOSIS — D35.02 BENIGN NEOPLASM OF LEFT ADRENAL GLAND: ICD-10-CM

## 2024-05-17 DIAGNOSIS — D50.8 OTHER IRON DEFICIENCY ANEMIAS: ICD-10-CM

## 2024-05-17 DIAGNOSIS — R19.04 LEFT LOWER QUADRANT ABDOMINAL SWELLING, MASS AND LUMP: ICD-10-CM

## 2024-05-17 DIAGNOSIS — E55.9 VITAMIN D DEFICIENCY, UNSPECIFIED: ICD-10-CM

## 2024-05-17 DIAGNOSIS — Z11.52 ENCOUNTER FOR SCREENING FOR COVID-19: ICD-10-CM

## 2024-05-17 DIAGNOSIS — R74.8 ABNORMAL LEVELS OF OTHER SERUM ENZYMES: ICD-10-CM

## 2024-05-17 DIAGNOSIS — Z87.39 PERSONAL HISTORY OF OTHER DISEASES OF THE MUSCULOSKELETAL SYSTEM AND CONNECTIVE TISSUE: ICD-10-CM

## 2024-05-17 DIAGNOSIS — R53.83 OTHER FATIGUE: ICD-10-CM

## 2024-05-17 PROCEDURE — 84156 ASSAY OF PROTEIN URINE: CPT

## 2024-05-17 PROCEDURE — 84166 PROTEIN E-PHORESIS/URINE/CSF: CPT

## 2024-05-17 PROCEDURE — 36415 COLL VENOUS BLD VENIPUNCTURE: CPT

## 2024-05-20 ENCOUNTER — HOSPITAL ENCOUNTER (OUTPATIENT)
Dept: RADIOLOGY | Age: 74
Discharge: HOME | End: 2024-05-20
Payer: COMMERCIAL

## 2024-05-20 DIAGNOSIS — C90.00 MULTIPLE MYELOMA NOT HAVING ACHIEVED REMISSION (HCC): ICD-10-CM

## 2024-05-20 DIAGNOSIS — G89.4 CHRONIC PAIN SYNDROME: ICD-10-CM

## 2024-05-20 DIAGNOSIS — Z87.39 PERSONAL HISTORY OF OTHER DISEASES OF THE MUSCULOSKELETAL SYSTEM AND CONNECTIVE TISSUE: ICD-10-CM

## 2024-05-20 DIAGNOSIS — M25.552 PAIN IN LEFT HIP: ICD-10-CM

## 2024-05-20 LAB
ALBUMIN UR ELPH-MCNC: 100 %
ALPHA1 GLOB MFR UR ELPH: 0 %
ALPHA2 GLOB MFR UR ELPH: 0 %
B-GLOBULIN MFR UR ELPH: 0 %
GAMMA GLOB MFR UR ELPH: 0 %
GLUCOSE SERPL-MCNC: 98 MG/DL (ref 65–140)
PROT PATTERN UR ELPH-IMP: NORMAL
PROT UR-MCNC: 4 MG/DL

## 2024-05-20 PROCEDURE — 84166 PROTEIN E-PHORESIS/URINE/CSF: CPT | Performed by: PATHOLOGY

## 2024-05-20 PROCEDURE — 78815 PET IMAGE W/CT SKULL-THIGH: CPT

## 2024-05-20 PROCEDURE — 82948 REAGENT STRIP/BLOOD GLUCOSE: CPT

## 2024-05-20 PROCEDURE — A9552 F18 FDG: HCPCS

## 2024-05-23 ENCOUNTER — PROCEDURE VISIT (OUTPATIENT)
Dept: OBGYN CLINIC | Facility: CLINIC | Age: 74
End: 2024-05-23
Payer: COMMERCIAL

## 2024-05-23 VITALS
TEMPERATURE: 97.7 F | SYSTOLIC BLOOD PRESSURE: 140 MMHG | RESPIRATION RATE: 18 BRPM | DIASTOLIC BLOOD PRESSURE: 90 MMHG | HEIGHT: 63 IN | WEIGHT: 238 LBS | OXYGEN SATURATION: 97 % | HEART RATE: 60 BPM | BODY MASS INDEX: 42.17 KG/M2

## 2024-05-23 DIAGNOSIS — M17.11 PRIMARY OSTEOARTHRITIS OF RIGHT KNEE: Primary | ICD-10-CM

## 2024-05-23 DIAGNOSIS — M17.12 PRIMARY OSTEOARTHRITIS OF LEFT KNEE: ICD-10-CM

## 2024-05-23 PROCEDURE — 20610 DRAIN/INJ JOINT/BURSA W/O US: CPT | Performed by: FAMILY MEDICINE

## 2024-05-23 RX ORDER — BUPIVACAINE HYDROCHLORIDE 2.5 MG/ML
2 INJECTION, SOLUTION INFILTRATION; PERINEURAL
Status: COMPLETED | OUTPATIENT
Start: 2024-05-23 | End: 2024-05-23

## 2024-05-23 RX ORDER — GABAPENTIN 300 MG/1
300 CAPSULE ORAL 3 TIMES DAILY
COMMUNITY
Start: 2024-05-15

## 2024-05-23 RX ADMIN — BUPIVACAINE HYDROCHLORIDE 2 ML: 2.5 INJECTION, SOLUTION INFILTRATION; PERINEURAL at 08:00

## 2024-05-23 NOTE — PROGRESS NOTES
Large joint arthrocentesis: bilateral knee  Universal Protocol:  Consent: Verbal consent obtained.  Risks and benefits: risks, benefits and alternatives were discussed  Consent given by: patient  Patient identity confirmed: verbally with patient  Supporting Documentation  Indications: pain   Procedure Details  Location: knee - bilateral knee  Preparation: Patient was prepped and draped in the usual sterile fashion  Needle size: 22 G  Ultrasound guidance: no  Approach: anterolateral    Medications (Right): 2 mL bupivacaine 0.25 %; 2 mL sodium hyaluronate 16.8 MG/2MLMedications (Left): 2 mL bupivacaine 0.25 %; 2 mL sodium hyaluronate 16.8 MG/2ML   Patient tolerance: patient tolerated the procedure well with no immediate complications

## 2024-05-28 LAB — MISCELLANEOUS LAB TEST RESULT: NORMAL

## 2024-05-30 ENCOUNTER — PROCEDURE VISIT (OUTPATIENT)
Dept: OBGYN CLINIC | Facility: CLINIC | Age: 74
End: 2024-05-30
Payer: COMMERCIAL

## 2024-05-30 VITALS
HEIGHT: 63 IN | HEART RATE: 60 BPM | BODY MASS INDEX: 42.17 KG/M2 | WEIGHT: 238 LBS | TEMPERATURE: 98 F | SYSTOLIC BLOOD PRESSURE: 140 MMHG | DIASTOLIC BLOOD PRESSURE: 82 MMHG | RESPIRATION RATE: 18 BRPM | OXYGEN SATURATION: 99 %

## 2024-05-30 DIAGNOSIS — M17.11 PRIMARY OSTEOARTHRITIS OF RIGHT KNEE: Primary | ICD-10-CM

## 2024-05-30 DIAGNOSIS — M17.12 PRIMARY OSTEOARTHRITIS OF LEFT KNEE: ICD-10-CM

## 2024-05-30 PROCEDURE — 20610 DRAIN/INJ JOINT/BURSA W/O US: CPT | Performed by: FAMILY MEDICINE

## 2024-05-30 NOTE — PROGRESS NOTES
Large joint arthrocentesis: bilateral knee  Universal Protocol:  Consent: Verbal consent obtained.  Risks and benefits: risks, benefits and alternatives were discussed  Consent given by: patient  Patient identity confirmed: verbally with patient  Supporting Documentation  Indications: pain   Procedure Details  Location: knee - bilateral knee  Preparation: Patient was prepped and draped in the usual sterile fashion  Needle size: 22 G  Ultrasound guidance: no  Approach: anterolateral    Medications (Right): 2 mL sodium hyaluronate 16.8 MG/2MLMedications (Left): 2 mL sodium hyaluronate 16.8 MG/2ML   Patient tolerance: patient tolerated the procedure well with no immediate complications

## 2024-05-31 ENCOUNTER — TELEPHONE (OUTPATIENT)
Dept: FAMILY MEDICINE CLINIC | Facility: CLINIC | Age: 74
End: 2024-05-31

## 2024-05-31 ENCOUNTER — HOSPITAL ENCOUNTER (OUTPATIENT)
Dept: MRI IMAGING | Facility: HOSPITAL | Age: 74
End: 2024-05-31
Payer: COMMERCIAL

## 2024-05-31 DIAGNOSIS — D35.02 BENIGN NEOPLASM OF LEFT ADRENAL GLAND: ICD-10-CM

## 2024-05-31 DIAGNOSIS — C90.00 MULTIPLE MYELOMA NOT HAVING ACHIEVED REMISSION (HCC): ICD-10-CM

## 2024-05-31 PROCEDURE — 72197 MRI PELVIS W/O & W/DYE: CPT

## 2024-05-31 PROCEDURE — 74183 MRI ABD W/O CNTR FLWD CNTR: CPT

## 2024-05-31 PROCEDURE — A9585 GADOBUTROL INJECTION: HCPCS | Performed by: RADIOLOGY

## 2024-05-31 RX ORDER — GADOBUTROL 604.72 MG/ML
10 INJECTION INTRAVENOUS
Status: COMPLETED | OUTPATIENT
Start: 2024-05-31 | End: 2024-05-31

## 2024-05-31 RX ADMIN — GADOBUTROL 10 ML: 604.72 INJECTION INTRAVENOUS at 17:49

## 2024-05-31 NOTE — TELEPHONE ENCOUNTER
Spoke with patient she stated she will call back at a later time to schedule her medicare annual wellness exam with Dr Manzanares.

## 2024-06-06 ENCOUNTER — PROCEDURE VISIT (OUTPATIENT)
Dept: OBGYN CLINIC | Facility: CLINIC | Age: 74
End: 2024-06-06
Payer: COMMERCIAL

## 2024-06-06 VITALS
HEIGHT: 63 IN | RESPIRATION RATE: 18 BRPM | TEMPERATURE: 98 F | DIASTOLIC BLOOD PRESSURE: 86 MMHG | OXYGEN SATURATION: 98 % | HEART RATE: 57 BPM | WEIGHT: 237 LBS | BODY MASS INDEX: 41.99 KG/M2 | SYSTOLIC BLOOD PRESSURE: 145 MMHG

## 2024-06-06 DIAGNOSIS — M17.11 PRIMARY OSTEOARTHRITIS OF RIGHT KNEE: ICD-10-CM

## 2024-06-06 DIAGNOSIS — M17.12 PRIMARY OSTEOARTHRITIS OF LEFT KNEE: Primary | ICD-10-CM

## 2024-06-06 PROCEDURE — 20610 DRAIN/INJ JOINT/BURSA W/O US: CPT | Performed by: FAMILY MEDICINE

## 2024-06-06 RX ORDER — BUPIVACAINE HYDROCHLORIDE 2.5 MG/ML
1 INJECTION, SOLUTION INFILTRATION; PERINEURAL
Status: COMPLETED | OUTPATIENT
Start: 2024-06-06 | End: 2024-06-06

## 2024-06-06 RX ADMIN — BUPIVACAINE HYDROCHLORIDE 1 ML: 2.5 INJECTION, SOLUTION INFILTRATION; PERINEURAL at 08:00

## 2024-06-06 NOTE — PROGRESS NOTES
Large joint arthrocentesis: bilateral knee  Universal Protocol:  Consent: Verbal consent obtained.  Risks and benefits: risks, benefits and alternatives were discussed  Consent given by: patient  Patient identity confirmed: verbally with patient  Supporting Documentation  Indications: pain   Procedure Details  Location: knee - bilateral knee  Preparation: Patient was prepped and draped in the usual sterile fashion  Needle size: 22 G  Ultrasound guidance: no  Approach: anterolateral    Medications (Right): 1 mL bupivacaine 0.25 %; 2 mL sodium hyaluronate 16.8 MG/2MLMedications (Left): 1 mL bupivacaine 0.25 %; 2 mL sodium hyaluronate 16.8 MG/2ML   Patient tolerance: patient tolerated the procedure well with no immediate complications

## 2024-08-01 ENCOUNTER — TELEPHONE (OUTPATIENT)
Dept: FAMILY MEDICINE CLINIC | Facility: CLINIC | Age: 74
End: 2024-08-01

## 2024-08-01 NOTE — TELEPHONE ENCOUNTER
08/01/24 12:09 PM        The office's request has been received, reviewed, and the patient chart updated. The PCP has successfully been removed with a patient attribution note. This message will now be completed.        Thank you  Warren Benavidez

## 2024-08-01 NOTE — TELEPHONE ENCOUNTER
Called and spoke with patient to schedule medicare annual wellness exam. She stated that she would like to have Dr Manzanares removed from her chart. She is now seeing Dr Delgado at [facility].

## 2024-12-09 DIAGNOSIS — I10 PRIMARY HYPERTENSION: ICD-10-CM

## 2024-12-12 RX ORDER — CARVEDILOL 25 MG/1
25 TABLET ORAL 2 TIMES DAILY WITH MEALS
Qty: 60 TABLET | Refills: 1 | Status: SHIPPED | OUTPATIENT
Start: 2024-12-12